# Patient Record
Sex: FEMALE | Race: WHITE | NOT HISPANIC OR LATINO | Employment: OTHER | URBAN - METROPOLITAN AREA
[De-identification: names, ages, dates, MRNs, and addresses within clinical notes are randomized per-mention and may not be internally consistent; named-entity substitution may affect disease eponyms.]

---

## 2017-04-19 ENCOUNTER — GENERIC CONVERSION - ENCOUNTER (OUTPATIENT)
Dept: OTHER | Facility: OTHER | Age: 77
End: 2017-04-19

## 2017-09-14 ENCOUNTER — ALLSCRIPTS OFFICE VISIT (OUTPATIENT)
Dept: OTHER | Facility: OTHER | Age: 77
End: 2017-09-14

## 2017-10-17 ENCOUNTER — GENERIC CONVERSION - ENCOUNTER (OUTPATIENT)
Dept: OTHER | Facility: OTHER | Age: 77
End: 2017-10-17

## 2017-10-19 ENCOUNTER — GENERIC CONVERSION - ENCOUNTER (OUTPATIENT)
Dept: OTHER | Facility: OTHER | Age: 77
End: 2017-10-19

## 2017-11-28 ENCOUNTER — GENERIC CONVERSION - ENCOUNTER (OUTPATIENT)
Dept: OTHER | Facility: OTHER | Age: 77
End: 2017-11-28

## 2018-01-15 NOTE — RESULT NOTES
Verified Results  (1) CBC/PLT/DIFF [id number] 07:26AM Puneet Ozuna     Test Name Result Flag Reference   WBC COUNT 6 60 Thousand/uL  4 80-10 80   WBC ADJUSTED 6 60 Thousand/uL  4 80-10 80   RBC COUNT 4 95 Million/uL  4 20-5 40   HEMOGLOBIN 14 2 g/dL  12 0-16 0   HEMATOCRIT 43 9 %  37 0-47 0   MCV 89 fL  82-98   MCH 28 8 pg  27 0-31 0   MCHC 32 5 g/dL  31 4-37 4   RDW 14 7 %  11 6-15 1   MPV 8 1 fL L 8 9-12 7   PLATELET COUNT 514 Thousands/uL  130-400   nRBC AUTOMATED 0 /100 WBCs     NEUTROPHILS RELATIVE PERCENT 61 %  43-75   LYMPHOCYTES RELATIVE PERCENT 29 %  14-44   MONOCYTES RELATIVE PERCENT 7 %  4-12   EOSINOPHILS RELATIVE PERCENT 3 %  0-6   BASOPHILS RELATIVE PERCENT 1 %  0-1   NEUTROPHILS ABSOLUTE COUNT 4 00 Thousands/?L  1 85-7 62   LYMPHOCYTES ABSOLUTE COUNT 1 90 Thousands/?L  0 60-4 47   MONOCYTES ABSOLUTE COUNT 0 40 Thousand/?L  0 17-1 22   EOSINOPHILS ABSOLUTE COUNT 0 20 Thousand/?L  0 00-0 61   BASOPHILS ABSOLUTE COUNT 0 00 Thousands/?L  0 00-0 10     (1) COMPREHENSIVE METABOLIC PANEL [id number] 02:00TW Lily Youssef     Test Name Result Flag Reference   GLUCOSE,RANDM 102 mg/dL     If the patient is fasting, the ADA then defines impaired fasting glucose as > 100 mg/dL and diabetes as > or equal to 123 mg/dL     SODIUM 141 mmol/L  136-145   POTASSIUM 3 9 mmol/L  3 5-5 3   CHLORIDE 107 mmol/L  100-108   CARBON DIOXIDE 26 mmol/L  21-32   ANION GAP (CALC) 8 mmol/L  4-13   BLOOD UREA NITROGEN 18 mg/dL  5-25   CREATININE 0 70 mg/dL  0 60-1 30   Standardized to IDMS reference method   CALCIUM 8 8 mg/dL  8 3-10 1   BILI, TOTAL 0 50 mg/dL  0 20-1 00   ALK PHOSPHATAS 70 U/L     ALT (SGPT) 38 U/L  12-78   AST(SGOT) 23 U/L  5-45   ALBUMIN 3 4 g/dL L 3 5-5 0   TOTAL PROTEIN 6 7 g/dL  6 4-8 2   eGFR Non-African American      >60 0 ml/min/1 73sq m   [employer] Disease Education Program recommendations are as follows:  GFR calculation is accurate only with a steady state creatinine  Chronic Kidney disease less than 60 ml/min/1 73 sq  meters  Kidney failure less than 15 ml/min/1 73 sq  meters  (1) LIPID PANEL FASTING W DIRECT LDL REFLEX [id number] 07:26AM Eugenie Gusman     Test Name Result Flag Reference   CHOLESTEROL 247 mg/dL H    LDL CHOLESTEROL CALCULATED 127 mg/dL H 0-100   Triglyceride:         Normal              <150 mg/dl       Borderline High    150-199 mg/dl       High               200-499 mg/dl       Very High          >499 mg/dl  Cholesterol:         Desirable        <200 mg/dl      Borderline High  200-239 mg/dl      High             >239 mg/dl  HDL Cholesterol:        High    >59 mg/dL      Low     <41 mg/dL  LDL Cholesterol:        Optimal          <100 mg/dl        Near Optimal     100-129 mg/dl        Above Optimal          Borderline High   130-159 mg/dl          High              160-189 mg/dl          Very High        >189 mg/dl  LDL CALCULATED:    This screening LDL is a calculated result  It does not have the accuracy of the Direct Measured LDL in the monitoring of patients with hyperlipidemia and/or statin therapy  Direct Measure LDL (TZN064) must be ordered separately in these patients  TRIGLYCERIDES 84 mg/dL  <=150   Specimen collection should occur prior to N-Acetylcysteine or Metamizole administration due to the potential for falsely depressed results  HDL,DIRECT 103 mg/dL H 40-60   Specimen collection should occur prior to Metamizole administration due to the potential for falsely depressed results  (1) TSH WITH FT4 REFLEX [id number] 07:26AM Lily Youssef     Test Name Result Flag Reference   TSH 2 714 uIU/mL  0 358-3 740   Patients undergoing fluorescein dye angiography may retain small amounts of fluorescein in the body for 48-72 hours post procedure  Samples containing fluorescein can produce falsely depressed TSH values  If the patient had this procedure,a specimen should be resubmitted post fluorescein clearance          The recommended reference ranges for TSH during pregnancy are as follows:  First trimester 0 1 to 2 5 uIU/mL  Second trimester  0 2 to 3 0 uIU/mL  Third trimester 0 3 to 3 0 uIU/m       Discussion/Summary   Jose C Story- your HDL is elevated and your LDL is slightly elevated, which is consistent with you told me it has been in the past   Please schedule an appointment if you would ilke to review these    GUS Coleman     Signatures   Electronically signed by : Ronaldo Winkler; May 16 2016  9:02AM EST                       (Author)

## 2018-01-17 NOTE — CONSULTS
Chief Complaint  Pre-Op Medication review blood pressure check Dr Dale Salinas 10/04/2017 [phone] Eye lid manipulation [id number]      History of Present Illness  Pre-Op Visit (Brief): The patient is being seen for a preoperative visit and Lifting of upper eye lids affecting peripheral vision  Surgical Risk Assessment:   Prior Anesthesia: She had prior anesthesia, no prior adverse reaction to edidural anesthesia, no prior adverse reaction to spinal anesthesia, a prior adverse reaction to general anesthesia and conscious sedation for endoscopy  Pertinent Past Medical History: Hyperlipidemia, Hypertension  Exercise Capacity: able to walk four blocks without symptoms and able to walk two flights of stairs without symptoms  Lifestyle Factors: denies alcohol use, denies tobacco use and denies illegal drug use  Symptoms: no symptoms, no easy bleeding, no easy bruising, no heavy menses, no frequent nosebleeds, no chest pain, no cough, no dyspnea, no palpitations, no wheezing and Edema resolved  Living Situation: home is secure and supportive and no post-op concerns with her living situation  HPI: Here for preoperative visit for eye surgery  Will be having surgery on 10/4 for uplifting of upper eye lids  Denies any concerns and complaints  Follows with [facility]ologist at [facility] with Dr Antonia Mohamud and had last visit 6 months ago and did the blood work there  Review of Systems    Constitutional: No fever, no chills, feels well, no tiredness, no recent weight gain or weight loss  Eyes: as noted in HPI    ENT: no complaints of earache, no loss of hearing, no nose bleeds, no nasal discharge, no sore throat, no hoarseness  Cardiovascular: No complaints of slow heart rate, no fast heart rate, no chest pain, no palpitations, no leg claudication, no lower extremity edema  Respiratory: No complaints of shortness of breath, no wheezing, no cough, no SOB on exertion, no orthopnea, no PND     Gastrointestinal: No complaints of abdominal pain, no constipation, no nausea or vomiting, no diarrhea, no bloody stools  Genitourinary: No complaints of dysuria, no incontinence, no pelvic pain, no dysmenorrhea, no vaginal discharge or bleeding  Integumentary: No complaints of skin rash or lesions, no itching, no skin wounds, no breast pain or lump  Neurological: No complaints of headache, no confusion, no convulsions, no numbness, no dizziness or fainting, no tingling, no limb weakness, no difficulty walking  Endocrine: No complaints of proptosis, no hot flashes, no muscle weakness, no deepening of the voice, no feelings of weakness  Hematologic/Lymphatic: No complaints of swollen glands, no swollen glands in the neck, does not bleed easily, does not bruise easily  Active Problems    1  Acute UTI (599 0) (N39 0)   2  Allergic rhinitis (477 9) (J30 9)   3  Bilateral edema of lower extremity (782 3) (R60 0)   4  Gastric ulcer (531 90) (K25 9)   5  Leg cramp (729 82) (R25 2)   6  Meningioma (225 2) (D32 9)   7  Menopause (627 2) (Z78 0)   8  Mixed hyperlipidemia (272 2) (E78 2)   9  Need for diphtheria-tetanus-pertussis (Tdap) vaccine (V06 1) (Z23)   10  Need for immunization against influenza (V04 81) (Z23)   11  Neoplasm of uncertain behavior of skin (238 2) (D48 5)   12  Never a smoker    Past Medical History    · Acute upper respiratory infection (465 9) (J06 9)   · Screening for cardiovascular condition (V81 2) (Z13 6)   · Screening for thyroid disorder (V77 0) (Z13 29)    The active problems and past medical history were reviewed and updated today  Surgical History    · History of Total Abdominal Hysterectomy With Removal Of Both Ovaries   · History of Total Hip Replacement    The surgical history was reviewed and updated today         Family History    · Family history of lung cancer (V16 1) (Z80 1)    · Family history of multiple sclerosis (V17 2) (Z82 0)   · Family history of thyroid disease (V18 19) (Z83 49)    · Family history of diabetes mellitus (V18 0) (Z83 3)   · Family history of Graves' disease (V18 19) (Z83 49)   · Family history of Ovarian cancer    The family history was reviewed and updated today  Social History    · Never a smoker  The social history was reviewed and updated today  The social history was reviewed and is unchanged  Current Meds   1  Aspir-81 81 MG Oral Tablet Delayed Release; Take 1 tablet daily Recorded   2  Valsartan 80 MG Oral Tablet; 1 every day; Therapy: [id number] to Recorded    The medication list was reviewed and updated today  Allergies    1  Percocet TABS   2  Macrodantin   3  sulfa    Vitals  Signs    Temperature: 97 F  Heart Rate: 80  Respiration: 20  Systolic: 718  Diastolic: 80  Height: 5 ft 7 5 in  Weight: 237 lb   BMI Calculated: 36 57  BSA Calculated: 2 19    Physical Exam    Constitutional   General appearance: Abnormal   overweight  Head and Face   Head and face: Normal     Palpation of the face and sinuses: No sinus tenderness  Eyes   Conjunctiva and lids: No swelling, erythema or discharge  Pupils and irises: Equal, round, reactive to light  Ears, Nose, Mouth, and Throat   External inspection of ears and nose: Normal     Otoscopic examination: Tympanic membranes translucent with normal light reflex  Canals patent without erythema  Nasal mucosa, septum, and turbinates: Normal without edema or erythema  Oropharynx: Normal with no erythema, edema, exudate or lesions  Neck   Neck: Supple, symmetric, trachea midline, no masses  Thyroid: Normal, no thyromegaly  Pulmonary   Respiratory effort: No increased work of breathing or signs of respiratory distress  Palpation of chest: Normal     Auscultation of lungs: Clear to auscultation  Cardiovascular   Auscultation of heart: Normal rate and rhythm, normal S1 and S2, no murmurs  Femoral pulses: 2+ bilaterally      Examination of extremities for edema and/or varicosities: Normal     Chest   Chest: Normal     Abdomen   Abdomen: Non-tender, no masses  Liver and spleen: No hepatomegaly or splenomegaly  Examination for hernias: No hernia appreciated  Lymphatic   Palpation of lymph nodes in neck: No lymphadenopathy  Palpation of lymph nodes in axillae: No lymphadenopathy  Palpation of lymph nodes in groin: No lymphadenopathy  Musculoskeletal   Gait and station: Normal     Skin   Skin and subcutaneous tissue: Normal without rashes or lesions  Neurologic   Reflexes: 2+ and symmetric  Sensation: No sensory loss  Coordination: Normal finger to nose and heel to shin  Psychiatric   Judgment and insight: Normal     Orientation to person, place, and time: Normal     Recent and remote memory: Intact  Mood and affect: Normal        Assessment    1  Benign hypertension (401 1) (I10)   2  Bilateral edema of lower extremity (782 3) (R60 0)   3  Meningioma (225 2) (D32 9)   4  Mixed hyperlipidemia (272 2) (E78 2)   5  Never a smoker   6  Pre-operative examination (V72 84) (Z01 818)   7  Limited peripheral vision (368 44) (H53 459)   8  Adult BMI 36 0-36 9 kg/sq m (V85 36) (Z68 36)    Plan  Benign hypertension    · Valsartan 80 MG Oral Tablet; 1 every day    Discussion/Summary  Surgical Clearance: She is at a MODERATE risk from a cardiovascular standpoint at this time without any additional cardiac testing  Reevaluation needed, if she should present with symptoms prior to surgery/procedure  Continue diovan 80 mg daily  Stop Aspirin week before surgery  Patient educated and instructions provided when to seek immediate medical attention  The patient was counseled regarding instructions for management, risk factor reductions, patient and family education, importance of compliance with treatment  Possible side effects of new medications were reviewed with the patient/guardian today  The treatment plan was reviewed with the patient/guardian   The patient/guardian understands and agrees with the treatment plan      End of Encounter Meds    1  Valsartan 80 MG Oral Tablet; 1 every day; Therapy: [id number] to Recorded    2  Aspir-81 81 MG Oral Tablet Delayed Release;  Take 1 tablet daily Recorded    Signatures   Electronically signed by : Junior Noriega; Sep 14 2017 10:54AM EST                       (Author)    Electronically signed by : CAMPOS Galvin ; Sep 14 2017  1:15PM EST                       (Review)

## 2018-01-22 VITALS
HEART RATE: 80 BPM | SYSTOLIC BLOOD PRESSURE: 130 MMHG | WEIGHT: 237 LBS | BODY MASS INDEX: 35.92 KG/M2 | TEMPERATURE: 97 F | RESPIRATION RATE: 20 BRPM | DIASTOLIC BLOOD PRESSURE: 80 MMHG | HEIGHT: 68 IN

## 2018-04-03 ENCOUNTER — TRANSCRIBE ORDERS (OUTPATIENT)
Dept: ADMINISTRATIVE | Facility: HOSPITAL | Age: 78
End: 2018-04-03

## 2018-04-03 DIAGNOSIS — M17.11 OSTEOARTHRITIS OF RIGHT KNEE, UNSPECIFIED OSTEOARTHRITIS TYPE: ICD-10-CM

## 2018-04-03 DIAGNOSIS — M54.5 LOW BACK PAIN, UNSPECIFIED BACK PAIN LATERALITY, UNSPECIFIED CHRONICITY, WITH SCIATICA PRESENCE UNSPECIFIED: Primary | ICD-10-CM

## 2018-04-09 ENCOUNTER — HOSPITAL ENCOUNTER (OUTPATIENT)
Dept: RADIOLOGY | Facility: HOSPITAL | Age: 78
End: 2018-04-09
Payer: MEDICARE

## 2018-04-09 ENCOUNTER — HOSPITAL ENCOUNTER (OUTPATIENT)
Dept: RADIOLOGY | Facility: HOSPITAL | Age: 78
Discharge: HOME | End: 2018-04-09
Payer: MEDICARE

## 2018-04-09 DIAGNOSIS — M54.5 LOW BACK PAIN, UNSPECIFIED BACK PAIN LATERALITY, UNSPECIFIED CHRONICITY, WITH SCIATICA PRESENCE UNSPECIFIED: ICD-10-CM

## 2018-04-09 DIAGNOSIS — M17.11 OSTEOARTHRITIS OF RIGHT KNEE, UNSPECIFIED OSTEOARTHRITIS TYPE: ICD-10-CM

## 2018-04-09 PROCEDURE — 72148 MRI LUMBAR SPINE W/O DYE: CPT

## 2018-04-09 PROCEDURE — 73721 MRI JNT OF LWR EXTRE W/O DYE: CPT

## 2018-04-16 ENCOUNTER — OFFICE VISIT (OUTPATIENT)
Dept: FAMILY MEDICINE CLINIC | Facility: CLINIC | Age: 78
End: 2018-04-16
Payer: MEDICARE

## 2018-04-16 VITALS
HEIGHT: 68 IN | TEMPERATURE: 98.1 F | DIASTOLIC BLOOD PRESSURE: 76 MMHG | SYSTOLIC BLOOD PRESSURE: 130 MMHG | RESPIRATION RATE: 20 BRPM | BODY MASS INDEX: 37.59 KG/M2 | HEART RATE: 82 BPM | WEIGHT: 248 LBS

## 2018-04-16 DIAGNOSIS — R42 DIZZINESS: ICD-10-CM

## 2018-04-16 DIAGNOSIS — R53.83 FATIGUE, UNSPECIFIED TYPE: ICD-10-CM

## 2018-04-16 DIAGNOSIS — I10 BENIGN HYPERTENSION: ICD-10-CM

## 2018-04-16 DIAGNOSIS — I48.0 PAROXYSMAL ATRIAL FIBRILLATION (HCC): ICD-10-CM

## 2018-04-16 DIAGNOSIS — R06.81 APNEIC EPISODE: ICD-10-CM

## 2018-04-16 DIAGNOSIS — R07.89 POSTERIOR CHEST PAIN: Primary | ICD-10-CM

## 2018-04-16 PROBLEM — I48.91 ATRIAL FIBRILLATION (HCC): Status: ACTIVE | Noted: 2018-04-16

## 2018-04-16 PROBLEM — H53.459: Status: ACTIVE | Noted: 2017-09-14

## 2018-04-16 PROCEDURE — 99214 OFFICE O/P EST MOD 30 MIN: CPT | Performed by: NURSE PRACTITIONER

## 2018-04-16 PROCEDURE — 93000 ELECTROCARDIOGRAM COMPLETE: CPT | Performed by: NURSE PRACTITIONER

## 2018-04-16 RX ORDER — VALSARTAN 80 MG/1
160 TABLET ORAL DAILY
COMMUNITY
Start: 2017-09-14 | End: 2019-08-24 | Stop reason: HOSPADM

## 2018-04-16 RX ORDER — ASPIRIN 81 MG/1
1 TABLET ORAL DAILY
COMMUNITY
End: 2019-09-25

## 2018-04-16 NOTE — PROGRESS NOTES
Assessment/Plan:    EKG WNL, no acute ischemia  Labs ordered to be done tomorrow  She will follow up with cardiology next week as previously scheduled  Advised to seek immediate medical attention for chest pains, SOB, symptoms on exertion, or recurrent jaw pain, nausea, or prolonged symptoms    Problem List Items Addressed This Visit     Benign hypertension     stable         Relevant Orders    Ambulatory referral to Pulmonology    Atrial fibrillation ([address] )     Rate controlled, not on anticoagulation  Has cardiology f/u next week         Apneic episode    Relevant Orders    Ambulatory referral to Pulmonology      Other Visit Diagnoses     Posterior chest pain    -  Primary    Relevant Orders    POCT ECG (Completed)    CBC and differential    Comprehensive metabolic panel    TSH, 3rd generation with T4 reflex    Troponin I    NT-BNP PRO    CK (with reflex to MB)    Dizziness        Relevant Orders    POCT ECG (Completed)    CBC and differential    Comprehensive metabolic panel    TSH, 3rd generation with T4 reflex    Troponin I    Fatigue, unspecified type        Relevant Orders    TSH, 3rd generation with T4 reflex          There are no Patient Instructions on file for this visit  No Follow-up on file  Subjective:      Patient ID: Edwyna Liza is a 68 y o  female  Chief Complaint   Patient presents with    Nausea     pt states she feeling "different"    Back Pain     upper back     Fatigue     sleeping a lot        Last night in the middle of the night she regurgitated warm fluid into her mouth and developed a pain in her jaw that lasted a few seconds  Also had posterior chest pain when she vomited  She went back to bed and similar symptoms recurred in the morning hours  She has been feeling fatigued and "off "  Has intermittent dizziness  Denies chest pain, SOB, or exertional symptoms  Sees a cardiologist   Negative stress test   History of a fib, not on anticoagulation    Recently returned from [place], where she resides during the winter months  Has appt with cardiologist next week  Recent labs 3 months ago, cholesterol stable        The following portions of the patient's history were reviewed and updated as appropriate: allergies, current medications, past family history, past medical history, past social history, past surgical history and problem list     Review of Systems   Constitutional: Positive for fatigue  Negative for chills and fever  Respiratory: Negative for cough, shortness of breath and wheezing  Cardiovascular: Positive for chest pain  Negative for palpitations and leg swelling  Gastrointestinal: Positive for nausea and vomiting  Negative for abdominal pain and diarrhea  Skin: Negative for rash  Neurological: Negative for dizziness and headaches  All other systems reviewed and are negative  Current Outpatient Prescriptions   Medication Sig Dispense Refill    aspirin (ASPIR-81) 81 mg EC tablet Take 1 tablet by mouth daily      valsartan (DIOVAN) 80 mg tablet Take by mouth daily       No current facility-administered medications for this visit  Objective:    /76   Pulse 82   Temp 98 1 °F (36 7 °C)   Resp 20   Ht 5' 7 5" (1 715 m)   Wt 112 kg (248 lb)   BMI 38 27 kg/m²        Physical Exam   Constitutional: She appears well-developed and well-nourished  HENT:   Right Ear: Tympanic membrane, external ear and ear canal normal    Left Ear: Tympanic membrane, external ear and ear canal normal    Nose: No mucosal edema  Mouth/Throat: Oropharynx is clear and moist and mucous membranes are normal    Eyes: Conjunctivae are normal    Cardiovascular: Normal rate, regular rhythm and normal heart sounds  Pulmonary/Chest: Effort normal and breath sounds normal    Abdominal: Bowel sounds are normal  She exhibits no distension  There is no splenomegaly or hepatomegaly  There is no tenderness     Lymphadenopathy:        Right cervical: No superficial cervical adenopathy present  Left cervical: No superficial cervical adenopathy present  Skin: No rash noted  Psychiatric: She has a normal mood and affect  Nursing note and vitals reviewed               Joel Alex

## 2018-04-17 ENCOUNTER — TRANSCRIBE ORDERS (OUTPATIENT)
Dept: ADMINISTRATIVE | Facility: HOSPITAL | Age: 78
End: 2018-04-17

## 2018-04-17 ENCOUNTER — TELEPHONE (OUTPATIENT)
Dept: FAMILY MEDICINE CLINIC | Facility: CLINIC | Age: 78
End: 2018-04-17

## 2018-04-17 ENCOUNTER — APPOINTMENT (OUTPATIENT)
Dept: LAB | Facility: HOSPITAL | Age: 78
End: 2018-04-17
Payer: MEDICARE

## 2018-04-17 DIAGNOSIS — R42 DIZZINESS: ICD-10-CM

## 2018-04-17 DIAGNOSIS — R53.83 FATIGUE, UNSPECIFIED TYPE: ICD-10-CM

## 2018-04-17 DIAGNOSIS — R07.89 POSTERIOR CHEST PAIN: ICD-10-CM

## 2018-04-17 LAB
ALBUMIN SERPL BCP-MCNC: 3.4 G/DL (ref 3.5–5)
ALP SERPL-CCNC: 73 U/L (ref 46–116)
ALT SERPL W P-5'-P-CCNC: 27 U/L (ref 12–78)
ANION GAP SERPL CALCULATED.3IONS-SCNC: 8 MMOL/L (ref 4–13)
AST SERPL W P-5'-P-CCNC: 16 U/L (ref 5–45)
BASOPHILS # BLD AUTO: 0 THOUSANDS/ΜL (ref 0–0.1)
BASOPHILS NFR BLD AUTO: 0 % (ref 0–1)
BILIRUB SERPL-MCNC: 0.4 MG/DL (ref 0.2–1)
BUN SERPL-MCNC: 18 MG/DL (ref 5–25)
CALCIUM SERPL-MCNC: 9.3 MG/DL (ref 8.3–10.1)
CHLORIDE SERPL-SCNC: 108 MMOL/L (ref 100–108)
CK SERPL-CCNC: 69 U/L (ref 26–192)
CO2 SERPL-SCNC: 27 MMOL/L (ref 21–32)
CREAT SERPL-MCNC: 0.77 MG/DL (ref 0.6–1.3)
EOSINOPHIL # BLD AUTO: 0.2 THOUSAND/ΜL (ref 0–0.61)
EOSINOPHIL NFR BLD AUTO: 2 % (ref 0–6)
ERYTHROCYTE [DISTWIDTH] IN BLOOD BY AUTOMATED COUNT: 14.2 % (ref 11.6–15.1)
GFR SERPL CREATININE-BSD FRML MDRD: 75 ML/MIN/1.73SQ M
GLUCOSE SERPL-MCNC: 101 MG/DL (ref 65–140)
HCT VFR BLD AUTO: 42 % (ref 37–47)
HGB BLD-MCNC: 13.7 G/DL (ref 12–16)
LYMPHOCYTES # BLD AUTO: 2.2 THOUSANDS/ΜL (ref 0.6–4.47)
LYMPHOCYTES NFR BLD AUTO: 26 % (ref 14–44)
MCH RBC QN AUTO: 28.8 PG (ref 27–31)
MCHC RBC AUTO-ENTMCNC: 32.6 G/DL (ref 31.4–37.4)
MCV RBC AUTO: 88 FL (ref 82–98)
MONOCYTES # BLD AUTO: 0.5 THOUSAND/ΜL (ref 0.17–1.22)
MONOCYTES NFR BLD AUTO: 6 % (ref 4–12)
NEUTROPHILS # BLD AUTO: 5.6 THOUSANDS/ΜL (ref 1.85–7.62)
NEUTS SEG NFR BLD AUTO: 66 % (ref 43–75)
NRBC BLD AUTO-RTO: 0 /100 WBCS
NT-PROBNP SERPL-MCNC: 143 PG/ML
PLATELET # BLD AUTO: 246 THOUSANDS/UL (ref 130–400)
PMV BLD AUTO: 8.1 FL (ref 8.9–12.7)
POTASSIUM SERPL-SCNC: 4.1 MMOL/L (ref 3.5–5.3)
PROT SERPL-MCNC: 7 G/DL (ref 6.4–8.2)
RBC # BLD AUTO: 4.76 MILLION/UL (ref 4.2–5.4)
SODIUM SERPL-SCNC: 143 MMOL/L (ref 136–145)
TROPONIN I SERPL-MCNC: <0.02 NG/ML
TSH SERPL DL<=0.05 MIU/L-ACNC: 2.83 UIU/ML (ref 0.36–3.74)
WBC # BLD AUTO: 8.4 THOUSAND/UL (ref 4.8–10.8)

## 2018-04-17 PROCEDURE — 84484 ASSAY OF TROPONIN QUANT: CPT

## 2018-04-17 PROCEDURE — 83880 ASSAY OF NATRIURETIC PEPTIDE: CPT

## 2018-04-17 PROCEDURE — 82550 ASSAY OF CK (CPK): CPT

## 2018-04-17 PROCEDURE — 80053 COMPREHEN METABOLIC PANEL: CPT

## 2018-04-17 PROCEDURE — 84443 ASSAY THYROID STIM HORMONE: CPT

## 2018-04-17 PROCEDURE — 36415 COLL VENOUS BLD VENIPUNCTURE: CPT

## 2018-04-17 PROCEDURE — 85025 COMPLETE CBC W/AUTO DIFF WBC: CPT

## 2018-04-17 NOTE — TELEPHONE ENCOUNTER
Pt aware normal labs  Acute cardiac event is unlikely, however recommend f/u with cardiologist next week as previously scheduled  States she is feeling much better today  No episodes of chest pain, SOB, jaw pain, or other symptoms   No further action required

## 2018-04-23 ENCOUNTER — OFFICE VISIT (OUTPATIENT)
Dept: FAMILY MEDICINE CLINIC | Facility: CLINIC | Age: 78
End: 2018-04-23
Payer: MEDICARE

## 2018-04-23 VITALS
HEART RATE: 76 BPM | SYSTOLIC BLOOD PRESSURE: 136 MMHG | TEMPERATURE: 98.3 F | HEIGHT: 68 IN | BODY MASS INDEX: 37.59 KG/M2 | DIASTOLIC BLOOD PRESSURE: 88 MMHG | WEIGHT: 248 LBS | RESPIRATION RATE: 20 BRPM

## 2018-04-23 DIAGNOSIS — H00.011 HORDEOLUM EXTERNUM OF RIGHT UPPER EYELID: Primary | ICD-10-CM

## 2018-04-23 PROCEDURE — 99213 OFFICE O/P EST LOW 20 MIN: CPT | Performed by: NURSE PRACTITIONER

## 2018-04-23 RX ORDER — TOBRAMYCIN AND DEXAMETHASONE 3; 1 MG/ML; MG/ML
1 SUSPENSION/ DROPS OPHTHALMIC
Qty: 5 ML | Refills: 0 | Status: SHIPPED | OUTPATIENT
Start: 2018-04-23 | End: 2019-08-19

## 2018-04-23 NOTE — PROGRESS NOTES
Assessment/Plan:  Supportive care discussed and advised  Follow up for no improvement and worsening of conditions  Patient advised and educated when to see immediate medical care  Diagnoses and all orders for this visit:    Hordeolum externum of right upper eyelid  -     tobramycin-dexamethasone (TOBRADEX) ophthalmic suspension; Administer 1 drop to the right eye every 4 (four) hours while awake    BMI 38 0-38 9,adult          Return if symptoms worsen or fail to improve  Subjective:      Patient ID: Saeid Drew is a 68 y o  female  Chief Complaint   Patient presents with    Right eyelid with a tender lump for the past few days [facility]       HPI  Patient started with swelling of right eye upper eye lid and redness of eye with mild discomfort but denies any visual changes  Denies use of OTC  Denies fever and chills  The following portions of the patient's history were reviewed and updated as appropriate: allergies, current medications, past family history, past medical history, past social history, past surgical history and problem list       Review of Systems   Constitutional: Negative  Eyes: Positive for redness  Negative for photophobia, pain, discharge, itching and visual disturbance  Respiratory: Negative  Cardiovascular: Negative  Neurological: Negative  Psychiatric/Behavioral: Negative  Objective:    History   Smoking Status    Never Smoker   Smokeless Tobacco    Not on file       Allergies:    Allergies   Allergen Reactions    Macrodantin [Nitrofurantoin Macrocrystal]     Nitrofurantoin     Oxycodone-Acetaminophen Vomiting    Sulfa Antibiotics        Vitals:  /88   Pulse 76   Temp 98 3 °F (36 8 °C)   Resp 20   Ht 5' 7 5" (1 715 m)   Wt 112 kg (248 lb)   BMI 38 27 kg/m²     Current Outpatient Prescriptions   Medication Sig Dispense Refill    aspirin (ASPIR-81) 81 mg EC tablet Take 1 tablet by mouth daily      valsartan (DIOVAN) 80 mg tablet Take 160 mg by mouth daily        tobramycin-dexamethasone (TOBRADEX) ophthalmic suspension Administer 1 drop to the right eye every 4 (four) hours while awake 5 mL 0     No current facility-administered medications for this visit  Physical Exam   Constitutional: She is oriented to person, place, and time  She appears well-developed and well-nourished  HENT:   Head: Normocephalic and atraumatic  Right Ear: External ear normal    Left Ear: External ear normal    Eyes: Pupils are equal, round, and reactive to light  Right eye exhibits hordeolum (right upper eye lid)  Right conjunctiva is injected  Cardiovascular: Normal rate, regular rhythm and normal heart sounds  Pulmonary/Chest: Effort normal and breath sounds normal    Neurological: She is alert and oriented to person, place, and time  Skin: Skin is warm and dry  Psychiatric: She has a normal mood and affect   Her behavior is normal  Thought content normal          GUS Seals

## 2018-04-23 NOTE — PATIENT INSTRUCTIONS
Supportive care discussed and advised  Follow up for no improvement and worsening of conditions  Patient advised and educated when to see immediate medical care  Stye   WHAT YOU NEED TO KNOW:   A stye is a lump on the edge or inside of your eyelid caused by an infection  A stye can form on your upper or lower eyelid  It usually goes away in 2 to 4 days  DISCHARGE INSTRUCTIONS:   Medicines:   · Antibiotic medicine: This is given as an ointment to put into your eye  It is used to fight an infection caused by bacteria  Use as directed  · Take your medicine as directed  Contact your healthcare provider if you think your medicine is not helping or if you have side effects  Tell him of her if you are allergic to any medicine  Keep a list of the medicines, vitamins, and herbs you take  Include the amounts, and when and why you take them  Bring the list or the pill bottles to follow-up visits  Carry your medicine list with you in case of an emergency  Follow up with your healthcare provider as directed:  Write down your questions so you remember to ask them during your visits  Self-care:   · Use warm compresses: This will help decrease swelling and pain  Wet a clean washcloth with warm water and place it on your eye for 10 to 15 minutes, 3 to 4 times each day or as directed  · Keep your hands away from your eye: This helps to prevent the spread of the infection to other parts of the eye  Wash your hands often with soap and dry with a clean towel  Do not squeeze the stye  · Do not use eye makeup:  Do not wear eye makeup while you have a stye  Eye makeup may carry bacteria and cause another stye  Throw away eye makeup and brushes used to apply the makeup  Use new eye makeup after the stye has gone away  Do not share eye makeup with others  · Prevent another stye:  Wash your face and clean your eyelashes every day  Remove eye makeup with makeup remover   This helps to completely remove eye makeup without heavy rubbing  Contact your healthcare provider if:   · You have redness and discharge around your eye, and your eye pain is getting worse  · Your vision changes  · The stye has not gone away within 7 days  · The stye comes back within a short period of time after treatment  · You have questions or concerns about your condition or care  © 2017 [address] [employer] Information is for End User's use only and may not be sold, redistributed or otherwise used for commercial purposes  All illustrations and images included in CareNotes® are the copyrighted property of [employer] , [employer]  or Alistair Whalen  The above information is an  only  It is not intended as medical advice for individual conditions or treatments  Talk to your doctor, nurse or pharmacist before following any medical regimen to see if it is safe and effective for you

## 2018-12-12 RX ORDER — ATORVASTATIN CALCIUM 20 MG/1
TABLET, FILM COATED ORAL DAILY
Refills: 2 | COMMUNITY
Start: 2018-10-11 | End: 2019-09-25

## 2018-12-12 NOTE — PROGRESS NOTES
Subjective:      Patient ID: Maximo Jurado is a 66 y o  female  Chief Complaint   Patient presents with    Physical Exam       Here with some issues  Has been having urinary incontinence which is worsening  Has had progressively worsening stress incontinence but is also having urge incontinence as well  Has some fatigue and is feeling she needs a nap daily  No weight changes or constipation, no cold intolerance  Sees a cardiologist regularly and he recently increased her bp med, is doing well  The following portions of the patient's history were reviewed and updated as appropriate: allergies, current medications, past family history, past medical history, past social history, past surgical history and problem list     Review of Systems   Constitutional: Negative  Respiratory: Negative  Cardiovascular: Negative  Current Outpatient Prescriptions   Medication Sig Dispense Refill    aspirin (ASPIR-81) 81 mg EC tablet Take 1 tablet by mouth daily      atorvastatin (LIPITOR) 20 mg tablet   2    irbesartan (AVAPRO) 300 mg tablet Take 300 mg by mouth daily        magnesium gluconate (MAGONATE) 500 mg tablet Take 500 mg by mouth 2 (two) times a day      Omega-3 Fatty Acids (FISH OIL) 1,000 mg Take 1,000 mg by mouth daily      tobramycin-dexamethasone (TOBRADEX) ophthalmic suspension Administer 1 drop to the right eye every 4 (four) hours while awake (Patient not taking: Reported on 12/17/2018 ) 5 mL 0    valsartan (DIOVAN) 80 mg tablet Take 160 mg by mouth daily         No current facility-administered medications for this visit  Objective:    /80   Pulse 80   Temp (!) 97 4 °F (36 3 °C)   Resp 16   Ht 5' 7 5" (1 715 m)   Wt 114 kg (252 lb)   BMI 38 89 kg/m²        Physical Exam   Constitutional: She appears well-developed and well-nourished  Eyes: Conjunctivae are normal    Neck: Neck supple  No JVD present  No thyromegaly present     Cardiovascular: Normal rate, regular rhythm, normal heart sounds and intact distal pulses  Exam reveals no gallop and no friction rub  No murmur heard  Pulmonary/Chest: Effort normal and breath sounds normal  She has no wheezes  She has no rales  Abdominal: Soft  Bowel sounds are normal  She exhibits no distension  There is no tenderness  Musculoskeletal: She exhibits no edema  Assessment/Plan:    Benign hypertension  Stable on current medication, will continue  Mixed hyperlipidemia  Followed by cardiologist, lipids within goal   Continue statin  Diagnoses and all orders for this visit:    Medicare annual wellness visit, subsequent    Benign hypertension  -     Comprehensive metabolic panel; Future    Mixed hyperlipidemia    Other fatigue  Comments: Will check TSH  Orders:  -     TSH, 3rd generation with Free T4 reflex; Future  -     Ambulatory referral to Pulmonology; Future    Snoring  Comments:  Referred to pulmonology for sleep study, r/o RAJWINDER  Orders:  -     Ambulatory referral to Pulmonology; Future    Urinary incontinence, unspecified type  Comments:  Seems to have mixed stress and urge incontinence  Will refer to urogynecology, Dr Roxie Cheng information given  Other orders  -     atorvastatin (LIPITOR) 20 mg tablet;   -     irbesartan (AVAPRO) 300 mg tablet; Take 300 mg by mouth daily    -     magnesium gluconate (MAGONATE) 500 mg tablet; Take 500 mg by mouth 2 (two) times a day  -     Omega-3 Fatty Acids (FISH OIL) 1,000 mg; Take 1,000 mg by mouth daily          Return in about 6 months (around 6/17/2019)         Abraham Underwood MD

## 2018-12-17 ENCOUNTER — OFFICE VISIT (OUTPATIENT)
Dept: FAMILY MEDICINE CLINIC | Facility: CLINIC | Age: 78
End: 2018-12-17
Payer: MEDICARE

## 2018-12-17 VITALS
DIASTOLIC BLOOD PRESSURE: 80 MMHG | WEIGHT: 252 LBS | RESPIRATION RATE: 16 BRPM | HEIGHT: 68 IN | TEMPERATURE: 97.4 F | SYSTOLIC BLOOD PRESSURE: 130 MMHG | HEART RATE: 80 BPM | BODY MASS INDEX: 38.19 KG/M2

## 2018-12-17 DIAGNOSIS — R53.83 OTHER FATIGUE: ICD-10-CM

## 2018-12-17 DIAGNOSIS — R06.83 SNORING: ICD-10-CM

## 2018-12-17 DIAGNOSIS — I10 BENIGN HYPERTENSION: ICD-10-CM

## 2018-12-17 DIAGNOSIS — E78.2 MIXED HYPERLIPIDEMIA: ICD-10-CM

## 2018-12-17 DIAGNOSIS — R32 URINARY INCONTINENCE, UNSPECIFIED TYPE: ICD-10-CM

## 2018-12-17 DIAGNOSIS — Z00.00 MEDICARE ANNUAL WELLNESS VISIT, SUBSEQUENT: Primary | ICD-10-CM

## 2018-12-17 PROCEDURE — G0439 PPPS, SUBSEQ VISIT: HCPCS | Performed by: INTERNAL MEDICINE

## 2018-12-17 PROCEDURE — 99214 OFFICE O/P EST MOD 30 MIN: CPT | Performed by: INTERNAL MEDICINE

## 2018-12-17 RX ORDER — IRBESARTAN 300 MG/1
300 TABLET ORAL DAILY
COMMUNITY
Start: 2018-11-09 | End: 2019-11-22 | Stop reason: SDUPTHER

## 2018-12-17 RX ORDER — CHLORAL HYDRATE 500 MG
1000 CAPSULE ORAL DAILY
COMMUNITY
End: 2019-08-19

## 2018-12-17 RX ORDER — UREA 10 %
500 LOTION (ML) TOPICAL 2 TIMES DAILY
COMMUNITY
End: 2019-08-19

## 2018-12-17 NOTE — PATIENT INSTRUCTIONS

## 2018-12-17 NOTE — PROGRESS NOTES
Assessment and Plan:  Problem List Items Addressed This Visit        Cardiovascular and Mediastinum    Benign hypertension    Relevant Medications    irbesartan (AVAPRO) 300 mg tablet    Other Relevant Orders    Comprehensive metabolic panel       Other    Mixed hyperlipidemia    Relevant Medications    atorvastatin (LIPITOR) 20 mg tablet      Other Visit Diagnoses     Medicare annual wellness visit, subsequent    -  Primary    Other fatigue        Relevant Orders    TSH, 3rd generation with Free T4 reflex    Ambulatory referral to Pulmonology    Snoring        Relevant Orders    Ambulatory referral to Pulmonology        Health Maintenance Due   Topic Date Due    Medicare Annual Wellness Visit (AWV)  1940    INFLUENZA VACCINE  07/01/2018         HPI:  Patient Active Problem List   Diagnosis    Allergic rhinitis    Benign hypertension    At[facility] fibrillation ([facility] [address] [facility]    Gastric ulcer    Bilateral edema of lower extremity    Limited peripheral vision    [facility]    Menopause    Mixed hyperlipidemia    Neoplasm of uncertain behavior of skin    Apneic episode     Past Medical History:   Diagnosis Date    Cancer ([facility] )     Hyperlipidemia      Past Surgical History:   Procedure Laterality Date    HYSTERECTOMY      JOINT REPLACEMENT       No family history on file    History   Smoking Status    Never Smoker   Smokeless Tobacco    Not on file     History   Alcohol Use No      History   Drug Use No         Current Outpatient Prescriptions   Medication Sig Dispense Refill    aspirin (ASPIR-81) 81 mg EC tablet Take 1 tablet by mouth daily      atorvastatin (LIPITOR) 20 mg tablet   2    irbesartan (AVAPRO) 300 mg tablet Take 300 mg by mouth daily        magnesium gluconate (MAGONATE) 500 mg tablet Take 500 mg by mouth 2 (two) times a day      Omega-3 Fatty Acids (FISH OIL) 1,000 mg Take 1,000 mg by mouth daily      tobramycin-dexamethasone (TOBRADEX) ophthalmic suspension Administer 1 drop to the right eye every 4 (four) hours while awake (Patient not taking: Reported on 12/17/2018 ) 5 mL 0    valsartan (DIOVAN) 80 mg tablet Take 160 mg by mouth daily         No current facility-administered medications for this visit  Allergies   Allergen Reactions    Macrodantin [Nitrofurantoin Macrocrystal]     Nitrofurantoin     Oxycodone-Acetaminophen Vomiting    Sulfa Antibiotics      Immunization History   Administered Date(s) Administered    Influenza 11/01/2018    Influenza Split High Dose Preservative Free IM 11/28/2016    Pneumococcal Conjugate 13-Valent 01/01/2016    Pneumococcal Polysaccharide PPV23 01/01/2010    Tdap 05/26/2016    Zoster 01/01/2012       Patient Care Team:  Yoel Segura MD as PCP - General  MD Yoel George MD    Medicare Screening Tests and Risk Assessments:  Kassie Jose is here for her Subsequent Wellness visit  Health Risk Assessment:  Patient rates overall health as very good  Patient feels that their physical health rating is Slightly worse  Eyesight was rated as Same  Hearing was rated as Slightly worse  Patient feels that their emotional and mental health rating is Same  Pain experienced by patient in the last 7 days has been A lot  Patient's pain rating has been 8/10  Patient states that she has experienced no weight loss or gain in last 6 months  Emotional/Mental Health:  Patient has been feeling nervous/anxious  PHQ-9 Depression Screening:    Frequency of the following problems over the past two weeks:      1  Little interest or pleasure in doing things: 0 - not at all      2  Feeling down, depressed, or hopeless: 0 - not at all  PHQ-2 Score: 0          Broken Bones/Falls: Fall Risk Assessment:    In the past year, patient has experienced: No history of falling in past year          Bladder/Bowel:  Patient has leaked urine accidently in the last six months  Patient reports no loss of bowel control      Immunizations:  Patient has had a flu vaccination within the last year  Patient has received a pneumonia shot  Patient has received a shingles shot  Patient has received tetanus/diphtheria shot  Home Safety:  Patient has trouble with stairs inside or outside of their home  Patient currently reports that there are no safety hazards present in home, working smoke alarms, working carbon monoxide detectors  Preventative Screenings:   Breast cancer screening performed, colon cancer screen completed, cholesterol screen completed, glaucoma eye exam completed,     Nutrition:  Current diet: Regular and Limited junk food with servings of the following:  (Additional Comments: High fat)    Medications:  Patient is currently taking over-the-counter supplements  List of OTC medications includes: Magnesium and fish oil  Patient is able to manage medications  Lifestyle Choices:  Patient reports no tobacco use  Patient has not smoked or used tobacco in the past   Patient reports alcohol use  Alcohol use per week: socially  Patient drives a vehicle  Patient wears seat belt  Current level of exercise of physical activity described by patient as: Light walking   Activities of Daily Living:  Can get out of bed by his or her self, able to dress self, able to make own meals, able to do own shopping, able to bathe self, can do own laundry/housekeeping, can manage own money, pay bills and track expenses    Previous Hospitalizations:  No hospitalization or ED visit in past 12 months        Advanced Directives:  Patient has decided on a power of   Patient has spoken to designated power of   Patient has completed advanced directive          Preventative Screening/Counseling:      Cardiovascular:      General: Risks and Benefits Discussed and Screening Current     Due for Labs/Analytes/Optional EKG: Cholesterol and Lipid Panel          Diabetes:      General: Risks and Benefits Discussed and Screening Current      Counseling: Healthy Diet, Healthy Weight and Improve Physical Activity          Colorectal Cancer:      General: Screening Current          Breast Cancer:      General: Screening Not Indicated          Cervical Cancer:      General: Screening Not Indicated          Osteoporosis:      General: Patient Declines          AAA:      General: Screening Not Indicated          Glaucoma:      General: Screening Current          HIV:      General: Screening Not Indicated          Hepatitis C:      General: Patient Declines        Advanced Directives:   Patient has living will for healthcare, has durable POA for healthcare,     Immunizations:      Influenza: Influenza UTD This Year      Pneumococcal: Lifetime Vaccine Completed      Shingrix: Patient Declines      Hepatitis B (Low risk patients): Series Not Indicated      TD: Patient Declines      Other Preventative Counseling (Non-Medicare): Increase physical activity and Weight reduction discussed          Vision Screening Comments: Has an eye Dr Catherine Sidhu was recently seen  Physical Exam:  Review of Systems   Gastrointestinal: Negative for bowel incontinence  Psychiatric/Behavioral: The patient is not nervous/anxious  Vitals:    12/17/18 1121   BP: 130/80   Pulse: 80   Resp: 16   Temp: (!) 97 4 °F (36 3 °C)   Weight: 114 kg (252 lb)   Height: 5' 7 5" (1 715 m)   Body mass index is 38 89 kg/m²      Physical Exam

## 2019-01-04 ENCOUNTER — APPOINTMENT (OUTPATIENT)
Dept: LAB | Facility: HOSPITAL | Age: 79
End: 2019-01-04
Attending: INTERNAL MEDICINE
Payer: COMMERCIAL

## 2019-01-04 ENCOUNTER — TRANSCRIBE ORDERS (OUTPATIENT)
Dept: ADMINISTRATIVE | Facility: HOSPITAL | Age: 79
End: 2019-01-04

## 2019-01-04 DIAGNOSIS — E78.00 PURE HYPERCHOLESTEROLEMIA: ICD-10-CM

## 2019-01-04 DIAGNOSIS — R53.83 OTHER FATIGUE: ICD-10-CM

## 2019-01-04 DIAGNOSIS — E78.00 PURE HYPERCHOLESTEROLEMIA: Primary | ICD-10-CM

## 2019-01-04 DIAGNOSIS — I10 BENIGN HYPERTENSION: ICD-10-CM

## 2019-01-04 LAB
ALBUMIN SERPL BCP-MCNC: 3.5 G/DL (ref 3.5–5)
ALP SERPL-CCNC: 74 U/L (ref 46–116)
ALT SERPL W P-5'-P-CCNC: 24 U/L (ref 12–78)
ANION GAP SERPL CALCULATED.3IONS-SCNC: 8 MMOL/L (ref 4–13)
AST SERPL W P-5'-P-CCNC: 22 U/L (ref 5–45)
BILIRUB DIRECT SERPL-MCNC: 0.2 MG/DL (ref 0–0.2)
BILIRUB SERPL-MCNC: 0.9 MG/DL (ref 0.2–1)
BUN SERPL-MCNC: 17 MG/DL (ref 5–25)
CALCIUM SERPL-MCNC: 9.3 MG/DL (ref 8.3–10.1)
CHLORIDE SERPL-SCNC: 105 MMOL/L (ref 100–108)
CHOLEST SERPL-MCNC: 191 MG/DL (ref 50–200)
CO2 SERPL-SCNC: 28 MMOL/L (ref 21–32)
CREAT SERPL-MCNC: 0.82 MG/DL (ref 0.6–1.3)
GFR SERPL CREATININE-BSD FRML MDRD: 69 ML/MIN/1.73SQ M
GLUCOSE P FAST SERPL-MCNC: 99 MG/DL (ref 65–99)
HDLC SERPL-MCNC: 119 MG/DL (ref 40–60)
LDLC SERPL CALC-MCNC: 60 MG/DL (ref 0–100)
NONHDLC SERPL-MCNC: 72 MG/DL
POTASSIUM SERPL-SCNC: 3.8 MMOL/L (ref 3.5–5.3)
PROT SERPL-MCNC: 7 G/DL (ref 6.4–8.2)
SODIUM SERPL-SCNC: 141 MMOL/L (ref 136–145)
TRIGL SERPL-MCNC: 60 MG/DL
TSH SERPL DL<=0.05 MIU/L-ACNC: 3.24 UIU/ML (ref 0.36–3.74)

## 2019-01-04 PROCEDURE — 84443 ASSAY THYROID STIM HORMONE: CPT

## 2019-01-04 PROCEDURE — 80053 COMPREHEN METABOLIC PANEL: CPT

## 2019-01-04 PROCEDURE — 80061 LIPID PANEL: CPT

## 2019-01-04 PROCEDURE — 82248 BILIRUBIN DIRECT: CPT

## 2019-01-04 PROCEDURE — 36415 COLL VENOUS BLD VENIPUNCTURE: CPT

## 2019-02-11 ENCOUNTER — TELEPHONE (OUTPATIENT)
Dept: FAMILY MEDICINE CLINIC | Facility: CLINIC | Age: 79
End: 2019-02-11

## 2019-02-11 NOTE — TELEPHONE ENCOUNTER
Pt left a message on result hotline for her recent results she just had done 1/4/19  Pt is currently in [place], address to mail results is, [address] [facility] [phone]  I do see a letter is already mailed out, we will need to mail to her in [place]  If dr needs to speak with pt call [phone]   [place], [place]

## 2019-05-10 ENCOUNTER — APPOINTMENT (OUTPATIENT)
Dept: LAB | Facility: HOSPITAL | Age: 79
End: 2019-05-10
Payer: COMMERCIAL

## 2019-05-10 ENCOUNTER — TRANSCRIBE ORDERS (OUTPATIENT)
Dept: ADMINISTRATIVE | Facility: HOSPITAL | Age: 79
End: 2019-05-10

## 2019-05-10 DIAGNOSIS — E78.00 PURE HYPERCHOLESTEROLEMIA: Primary | ICD-10-CM

## 2019-05-10 DIAGNOSIS — E78.00 PURE HYPERCHOLESTEROLEMIA: ICD-10-CM

## 2019-05-10 LAB
ALBUMIN SERPL BCP-MCNC: 3.3 G/DL (ref 3.5–5)
ALP SERPL-CCNC: 81 U/L (ref 46–116)
ALT SERPL W P-5'-P-CCNC: 24 U/L (ref 12–78)
AST SERPL W P-5'-P-CCNC: 20 U/L (ref 5–45)
BILIRUB DIRECT SERPL-MCNC: 0.2 MG/DL (ref 0–0.2)
BILIRUB SERPL-MCNC: 0.7 MG/DL (ref 0.2–1)
CHOLEST SERPL-MCNC: 193 MG/DL (ref 50–200)
HDLC SERPL-MCNC: 116 MG/DL (ref 40–60)
LDLC SERPL CALC-MCNC: 67 MG/DL (ref 0–100)
NONHDLC SERPL-MCNC: 77 MG/DL
PROT SERPL-MCNC: 7.1 G/DL (ref 6.4–8.2)
TRIGL SERPL-MCNC: 50 MG/DL

## 2019-05-10 PROCEDURE — 80076 HEPATIC FUNCTION PANEL: CPT

## 2019-05-10 PROCEDURE — 80061 LIPID PANEL: CPT

## 2019-05-10 PROCEDURE — 36415 COLL VENOUS BLD VENIPUNCTURE: CPT

## 2019-06-19 PROBLEM — E66.01 MORBID OBESITY (HCC): Status: ACTIVE | Noted: 2017-04-19

## 2019-06-19 PROBLEM — K58.9 IRRITABLE BOWEL SYNDROME: Status: ACTIVE | Noted: 2019-06-19

## 2019-06-24 ENCOUNTER — TELEPHONE (OUTPATIENT)
Dept: FAMILY MEDICINE CLINIC | Facility: CLINIC | Age: 79
End: 2019-06-24

## 2019-08-19 ENCOUNTER — OFFICE VISIT (OUTPATIENT)
Dept: FAMILY MEDICINE CLINIC | Facility: CLINIC | Age: 79
End: 2019-08-19
Payer: COMMERCIAL

## 2019-08-19 VITALS
HEIGHT: 68 IN | RESPIRATION RATE: 16 BRPM | TEMPERATURE: 99.1 F | SYSTOLIC BLOOD PRESSURE: 140 MMHG | BODY MASS INDEX: 38.89 KG/M2 | DIASTOLIC BLOOD PRESSURE: 80 MMHG | HEART RATE: 80 BPM

## 2019-08-19 DIAGNOSIS — R39.9 UTI SYMPTOMS: Primary | ICD-10-CM

## 2019-08-19 PROCEDURE — 87077 CULTURE AEROBIC IDENTIFY: CPT | Performed by: NURSE PRACTITIONER

## 2019-08-19 PROCEDURE — 1101F PT FALLS ASSESS-DOCD LE1/YR: CPT | Performed by: NURSE PRACTITIONER

## 2019-08-19 PROCEDURE — 1160F RVW MEDS BY RX/DR IN RCRD: CPT | Performed by: NURSE PRACTITIONER

## 2019-08-19 PROCEDURE — 87186 SC STD MICRODIL/AGAR DIL: CPT | Performed by: NURSE PRACTITIONER

## 2019-08-19 PROCEDURE — 3725F SCREEN DEPRESSION PERFORMED: CPT | Performed by: NURSE PRACTITIONER

## 2019-08-19 PROCEDURE — 99213 OFFICE O/P EST LOW 20 MIN: CPT | Performed by: NURSE PRACTITIONER

## 2019-08-19 PROCEDURE — 87086 URINE CULTURE/COLONY COUNT: CPT | Performed by: NURSE PRACTITIONER

## 2019-08-19 RX ORDER — PHENAZOPYRIDINE HYDROCHLORIDE 200 MG/1
200 TABLET, FILM COATED ORAL
Qty: 10 TABLET | Refills: 0 | Status: SHIPPED | OUTPATIENT
Start: 2019-08-19 | End: 2019-08-30

## 2019-08-19 RX ORDER — ATORVASTATIN CALCIUM 40 MG/1
TABLET, FILM COATED ORAL
COMMUNITY
Start: 2019-07-13 | End: 2019-08-24 | Stop reason: HOSPADM

## 2019-08-19 RX ORDER — HYDROCODONE BITARTRATE AND ACETAMINOPHEN 5; 325 MG/1; MG/1
TABLET ORAL
Refills: 0 | COMMUNITY
Start: 2019-07-15 | End: 2019-08-30

## 2019-08-19 RX ORDER — DOXYCYCLINE HYCLATE 100 MG/1
100 CAPSULE ORAL EVERY 12 HOURS SCHEDULED
Qty: 20 CAPSULE | Refills: 0 | Status: SHIPPED | OUTPATIENT
Start: 2019-08-19 | End: 2019-08-24 | Stop reason: HOSPADM

## 2019-08-19 RX ORDER — NAPROXEN 500 MG/1
TABLET ORAL
Refills: 1 | COMMUNITY
Start: 2019-07-17 | End: 2019-08-24 | Stop reason: HOSPADM

## 2019-08-19 NOTE — PROGRESS NOTES
Assessment/Plan:    Will send urine for culture and start on Doxycycline  Reviewed supportive care  Will f/u with culture results  Problem List Items Addressed This Visit     None      Visit Diagnoses     UTI symptoms    -  Primary    Relevant Medications    doxycycline hyclate (VIBRAMYCIN) 100 mg capsule    phenazopyridine (PYRIDIUM) 200 mg tablet    Other Relevant Orders    Urine culture          BMI Counseling: Body mass index is 38 89 kg/m²  Discussed the patient's BMI with her  The BMI is above average  BMI counseling and education was provided to the patient  Exercise recommendations include moderate aerobic physical activity for 150 minutes/week  There are no Patient Instructions on file for this visit  Return if symptoms worsen or fail to improve  Subjective:      Patient ID: Priya Gardiner is a 78 y o  female  Chief Complaint   Patient presents with    Urinary Tract Infection     prcma       She saw Dr Donald Zapata about a month ago and was started on Doxycycline 100mg BID for 10 days  She was also treated for a yeast infection with Diflucan and Monistat  She developed recurrent symptoms about 3 days ago  She is taking Pyridium which helps  She had dysuria, frequency, and urgency  Denies flank pain, n/v, fevers, or chills  She notes a decreased appetite  Urine appears normal in color with no foul odor  Started a new course of Monistat today  The following portions of the patient's history were reviewed and updated as appropriate: allergies, current medications, past family history, past medical history, past social history, past surgical history and problem list     Review of Systems   Constitutional: Negative for chills, fatigue and fever  Respiratory: Negative for cough and shortness of breath  Cardiovascular: Negative for chest pain  Gastrointestinal: Negative for abdominal pain, diarrhea, nausea and vomiting     Genitourinary: Positive for dysuria, frequency and urgency  Negative for flank pain, hematuria, pelvic pain and vaginal discharge  Musculoskeletal: Negative for arthralgias and back pain  Current Outpatient Medications   Medication Sig Dispense Refill    aspirin (ASPIR-81) 81 mg EC tablet Take 1 tablet by mouth daily      atorvastatin (LIPITOR) 20 mg tablet   2    atorvastatin (LIPITOR) 40 mg tablet       HYDROcodone-acetaminophen (NORCO) 5-325 mg per tablet   0    irbesartan (AVAPRO) 300 mg tablet Take 300 mg by mouth daily        naproxen (NAPROSYN) 500 mg tablet   1    valsartan (DIOVAN) 80 mg tablet Take 160 mg by mouth daily        doxycycline hyclate (VIBRAMYCIN) 100 mg capsule Take 1 capsule (100 mg total) by mouth every 12 (twelve) hours for 10 days 20 capsule 0    phenazopyridine (PYRIDIUM) 200 mg tablet Take 1 tablet (200 mg total) by mouth 3 (three) times a day with meals 10 tablet 0     No current facility-administered medications for this visit  Objective:    /80   Pulse 80   Temp 99 1 °F (37 3 °C)   Resp 16   Ht 5' 7 5" (1 715 m)   BMI 38 89 kg/m²        Physical Exam   Constitutional: She appears well-developed and well-nourished  Cardiovascular: Normal rate, regular rhythm and normal heart sounds  No murmur heard  Pulmonary/Chest: Effort normal and breath sounds normal    Neurological: She is alert  Skin: Skin is warm and dry  Psychiatric: She has a normal mood and affect  Nursing note and vitals reviewed               Chet Pavon

## 2019-08-21 ENCOUNTER — TELEPHONE (OUTPATIENT)
Dept: FAMILY MEDICINE CLINIC | Facility: CLINIC | Age: 79
End: 2019-08-21

## 2019-08-21 DIAGNOSIS — R39.9 UTI SYMPTOMS: Primary | ICD-10-CM

## 2019-08-21 DIAGNOSIS — N39.0 ACUTE UTI: ICD-10-CM

## 2019-08-21 NOTE — TELEPHONE ENCOUNTER
pts daughter called, pt is extremely sick from the doxy, she has had such an upset stomach that she hasnt been able to take her other medications  She did not take the doxy today     Urine culture results are back  Can you advise  wilberto juarez

## 2019-08-21 NOTE — TELEPHONE ENCOUNTER
Patient informed   She stated that the best number to reach her is [phone] (cell phone)  Elisabeth Castellon MA   Sending to Nae Melo for [facility]

## 2019-08-21 NOTE — TELEPHONE ENCOUNTER
Only the preliminary is available, which does show infection, however the sensitivity is not available yet  I should have the final results by tomorrow  If she is not tolerating the Doxycycline, she can discontinue this and will decide if we need to put her on something else once I get the final report back    Lucille Janel

## 2019-08-22 LAB — BACTERIA UR CULT: ABNORMAL

## 2019-08-22 RX ORDER — AMOXICILLIN AND CLAVULANATE POTASSIUM 875; 125 MG/1; MG/1
1 TABLET, FILM COATED ORAL EVERY 12 HOURS SCHEDULED
Qty: 14 TABLET | Refills: 0 | Status: ON HOLD | OUTPATIENT
Start: 2019-08-22 | End: 2019-08-24 | Stop reason: SDUPTHER

## 2019-08-22 NOTE — TELEPHONE ENCOUNTER
Reviewed urine culture, resistant to tetracycline  She has chills and feels nauseas  She reports drinking adequate fluids  Will start on Augmentin  To call office with any changes or worsening symptoms    Bennett Contreras

## 2019-08-23 ENCOUNTER — APPOINTMENT (OUTPATIENT)
Dept: NON INVASIVE DIAGNOSTICS | Facility: HOSPITAL | Age: 79
End: 2019-08-23
Attending: INTERNAL MEDICINE
Payer: COMMERCIAL

## 2019-08-23 ENCOUNTER — HOSPITAL ENCOUNTER (OUTPATIENT)
Facility: HOSPITAL | Age: 79
Setting detail: OBSERVATION
Discharge: HOME | End: 2019-08-24
Attending: EMERGENCY MEDICINE | Admitting: INTERNAL MEDICINE
Payer: COMMERCIAL

## 2019-08-23 ENCOUNTER — APPOINTMENT (EMERGENCY)
Dept: RADIOLOGY | Facility: HOSPITAL | Age: 79
End: 2019-08-23
Payer: COMMERCIAL

## 2019-08-23 ENCOUNTER — APPOINTMENT (OUTPATIENT)
Dept: NON INVASIVE DIAGNOSTICS | Facility: HOSPITAL | Age: 79
End: 2019-08-23
Payer: COMMERCIAL

## 2019-08-23 ENCOUNTER — TELEPHONE (OUTPATIENT)
Dept: FAMILY MEDICINE CLINIC | Facility: CLINIC | Age: 79
End: 2019-08-23

## 2019-08-23 DIAGNOSIS — N39.0 ACUTE UTI: ICD-10-CM

## 2019-08-23 DIAGNOSIS — I48.91 ATRIAL FIBRILLATION WITH RAPID VENTRICULAR RESPONSE (HCC): ICD-10-CM

## 2019-08-23 DIAGNOSIS — R00.2 PALPITATIONS: Primary | ICD-10-CM

## 2019-08-23 DIAGNOSIS — N39.0 UTI (URINARY TRACT INFECTION): ICD-10-CM

## 2019-08-23 DIAGNOSIS — I48.0 PAROXYSMAL ATRIAL FIBRILLATION (HCC): ICD-10-CM

## 2019-08-23 LAB
ANION GAP SERPL CALCULATED.3IONS-SCNC: 12 MMOL/L (ref 4–13)
APTT PPP: 27 SECONDS (ref 24–33)
BACTERIA UR QL AUTO: ABNORMAL /HPF
BASOPHILS # BLD AUTO: 0.03 THOUSANDS/ΜL (ref 0–0.1)
BASOPHILS NFR BLD AUTO: 0 % (ref 0–1)
BILIRUB UR QL STRIP: NEGATIVE
BUN SERPL-MCNC: 15 MG/DL (ref 5–25)
CALCIUM SERPL-MCNC: 8.7 MG/DL (ref 8.3–10.1)
CHLORIDE SERPL-SCNC: 101 MMOL/L (ref 100–108)
CLARITY UR: CLEAR
CO2 SERPL-SCNC: 23 MMOL/L (ref 21–32)
COLOR UR: ABNORMAL
CREAT SERPL-MCNC: 1.09 MG/DL (ref 0.6–1.3)
EOSINOPHIL # BLD AUTO: 1.42 THOUSAND/ΜL (ref 0–0.61)
EOSINOPHIL NFR BLD AUTO: 8 % (ref 0–6)
ERYTHROCYTE [DISTWIDTH] IN BLOOD BY AUTOMATED COUNT: 13.5 % (ref 11.6–15.1)
GFR SERPL CREATININE-BSD FRML MDRD: 48 ML/MIN/1.73SQ M
GLUCOSE SERPL-MCNC: 164 MG/DL (ref 65–140)
GLUCOSE UR STRIP-MCNC: NEGATIVE MG/DL
HCT VFR BLD AUTO: 47.1 % (ref 34.8–46.1)
HGB BLD-MCNC: 14.7 G/DL (ref 11.5–15.4)
HGB UR QL STRIP.AUTO: NEGATIVE
IMM GRANULOCYTES # BLD AUTO: 0.14 THOUSAND/UL (ref 0–0.2)
IMM GRANULOCYTES NFR BLD AUTO: 1 % (ref 0–2)
INR PPP: 0.99 (ref 0.86–1.16)
KETONES UR STRIP-MCNC: NEGATIVE MG/DL
LACTATE SERPL-SCNC: 1.5 MMOL/L (ref 0.5–2)
LEUKOCYTE ESTERASE UR QL STRIP: ABNORMAL
LYMPHOCYTES # BLD AUTO: 2.3 THOUSANDS/ΜL (ref 0.6–4.47)
LYMPHOCYTES NFR BLD AUTO: 13 % (ref 14–44)
MCH RBC QN AUTO: 28.8 PG (ref 26.8–34.3)
MCHC RBC AUTO-ENTMCNC: 31.2 G/DL (ref 31.4–37.4)
MCV RBC AUTO: 92 FL (ref 82–98)
MONOCYTES # BLD AUTO: 1.16 THOUSAND/ΜL (ref 0.17–1.22)
MONOCYTES NFR BLD AUTO: 7 % (ref 4–12)
NEUTROPHILS # BLD AUTO: 12.66 THOUSANDS/ΜL (ref 1.85–7.62)
NEUTS SEG NFR BLD AUTO: 71 % (ref 43–75)
NITRITE UR QL STRIP: POSITIVE
NON-SQ EPI CELLS URNS QL MICRO: ABNORMAL /HPF
NRBC BLD AUTO-RTO: 0 /100 WBCS
PH UR STRIP.AUTO: 5.5 [PH]
PLATELET # BLD AUTO: 280 THOUSANDS/UL (ref 149–390)
PMV BLD AUTO: 10.3 FL (ref 8.9–12.7)
POTASSIUM SERPL-SCNC: 3.7 MMOL/L (ref 3.5–5.3)
PROT UR STRIP-MCNC: NEGATIVE MG/DL
PROTHROMBIN TIME: 10.4 SECONDS (ref 9.4–11.7)
RBC # BLD AUTO: 5.1 MILLION/UL (ref 3.81–5.12)
RBC #/AREA URNS AUTO: ABNORMAL /HPF
SODIUM SERPL-SCNC: 136 MMOL/L (ref 136–145)
SP GR UR STRIP.AUTO: <=1.005 (ref 1–1.03)
TROPONIN I SERPL-MCNC: <0.02 NG/ML
TROPONIN I SERPL-MCNC: <0.02 NG/ML
TSH SERPL DL<=0.05 MIU/L-ACNC: 3.13 UIU/ML (ref 0.36–3.74)
UROBILINOGEN UR QL STRIP.AUTO: 0.2 E.U./DL
WBC # BLD AUTO: 17.71 THOUSAND/UL (ref 4.31–10.16)
WBC #/AREA URNS AUTO: ABNORMAL /HPF

## 2019-08-23 PROCEDURE — 99204 OFFICE O/P NEW MOD 45 MIN: CPT | Performed by: INTERNAL MEDICINE

## 2019-08-23 PROCEDURE — 81001 URINALYSIS AUTO W/SCOPE: CPT | Performed by: EMERGENCY MEDICINE

## 2019-08-23 PROCEDURE — 87040 BLOOD CULTURE FOR BACTERIA: CPT | Performed by: EMERGENCY MEDICINE

## 2019-08-23 PROCEDURE — 85730 THROMBOPLASTIN TIME PARTIAL: CPT | Performed by: EMERGENCY MEDICINE

## 2019-08-23 PROCEDURE — 36415 COLL VENOUS BLD VENIPUNCTURE: CPT | Performed by: EMERGENCY MEDICINE

## 2019-08-23 PROCEDURE — 87086 URINE CULTURE/COLONY COUNT: CPT | Performed by: INTERNAL MEDICINE

## 2019-08-23 PROCEDURE — 84443 ASSAY THYROID STIM HORMONE: CPT | Performed by: EMERGENCY MEDICINE

## 2019-08-23 PROCEDURE — 80048 BASIC METABOLIC PNL TOTAL CA: CPT | Performed by: EMERGENCY MEDICINE

## 2019-08-23 PROCEDURE — 93306 TTE W/DOPPLER COMPLETE: CPT

## 2019-08-23 PROCEDURE — 93005 ELECTROCARDIOGRAM TRACING: CPT

## 2019-08-23 PROCEDURE — 87081 CULTURE SCREEN ONLY: CPT | Performed by: INTERNAL MEDICINE

## 2019-08-23 PROCEDURE — 83605 ASSAY OF LACTIC ACID: CPT | Performed by: EMERGENCY MEDICINE

## 2019-08-23 PROCEDURE — 96374 THER/PROPH/DIAG INJ IV PUSH: CPT

## 2019-08-23 PROCEDURE — 99220 PR INITIAL OBSERVATION CARE/DAY 70 MINUTES: CPT | Performed by: INTERNAL MEDICINE

## 2019-08-23 PROCEDURE — 85025 COMPLETE CBC W/AUTO DIFF WBC: CPT | Performed by: EMERGENCY MEDICINE

## 2019-08-23 PROCEDURE — 99285 EMERGENCY DEPT VISIT HI MDM: CPT

## 2019-08-23 PROCEDURE — 84484 ASSAY OF TROPONIN QUANT: CPT | Performed by: EMERGENCY MEDICINE

## 2019-08-23 PROCEDURE — 93306 TTE W/DOPPLER COMPLETE: CPT | Performed by: INTERNAL MEDICINE

## 2019-08-23 PROCEDURE — 96361 HYDRATE IV INFUSION ADD-ON: CPT

## 2019-08-23 PROCEDURE — 85610 PROTHROMBIN TIME: CPT | Performed by: EMERGENCY MEDICINE

## 2019-08-23 PROCEDURE — 84484 ASSAY OF TROPONIN QUANT: CPT | Performed by: INTERNAL MEDICINE

## 2019-08-23 PROCEDURE — 71045 X-RAY EXAM CHEST 1 VIEW: CPT

## 2019-08-23 RX ORDER — ONDANSETRON 2 MG/ML
4 INJECTION INTRAMUSCULAR; INTRAVENOUS EVERY 6 HOURS PRN
Status: DISCONTINUED | OUTPATIENT
Start: 2019-08-23 | End: 2019-08-24 | Stop reason: HOSPADM

## 2019-08-23 RX ORDER — CEFTRIAXONE 1 G/50ML
1000 INJECTION, SOLUTION INTRAVENOUS EVERY 24 HOURS
Status: DISCONTINUED | OUTPATIENT
Start: 2019-08-23 | End: 2019-08-24

## 2019-08-23 RX ORDER — PHENAZOPYRIDINE HYDROCHLORIDE 100 MG/1
200 TABLET, FILM COATED ORAL
Status: DISCONTINUED | OUTPATIENT
Start: 2019-08-23 | End: 2019-08-24 | Stop reason: HOSPADM

## 2019-08-23 RX ORDER — HEPARIN SODIUM 10000 [USP'U]/100ML
3-20 INJECTION, SOLUTION INTRAVENOUS
Status: DISCONTINUED | OUTPATIENT
Start: 2019-08-23 | End: 2019-08-23

## 2019-08-23 RX ORDER — LOSARTAN POTASSIUM 50 MG/1
50 TABLET ORAL DAILY
Status: DISCONTINUED | OUTPATIENT
Start: 2019-08-23 | End: 2019-08-24 | Stop reason: HOSPADM

## 2019-08-23 RX ORDER — HEPARIN SODIUM 1000 [USP'U]/ML
4000 INJECTION, SOLUTION INTRAVENOUS; SUBCUTANEOUS ONCE
Status: COMPLETED | OUTPATIENT
Start: 2019-08-23 | End: 2019-08-23

## 2019-08-23 RX ORDER — DILTIAZEM HYDROCHLORIDE 5 MG/ML
10 INJECTION INTRAVENOUS ONCE
Status: COMPLETED | OUTPATIENT
Start: 2019-08-23 | End: 2019-08-23

## 2019-08-23 RX ORDER — ATORVASTATIN CALCIUM 20 MG/1
20 TABLET, FILM COATED ORAL
Status: DISCONTINUED | OUTPATIENT
Start: 2019-08-23 | End: 2019-08-24 | Stop reason: HOSPADM

## 2019-08-23 RX ORDER — ASPIRIN 81 MG/1
81 TABLET ORAL DAILY
Status: DISCONTINUED | OUTPATIENT
Start: 2019-08-23 | End: 2019-08-24 | Stop reason: HOSPADM

## 2019-08-23 RX ADMIN — ATORVASTATIN CALCIUM 20 MG: 20 TABLET, FILM COATED ORAL at 16:14

## 2019-08-23 RX ADMIN — SODIUM CHLORIDE 1000 ML: 0.9 INJECTION, SOLUTION INTRAVENOUS at 09:20

## 2019-08-23 RX ADMIN — CEFTRIAXONE 1000 MG: 1 INJECTION, SOLUTION INTRAVENOUS at 16:05

## 2019-08-23 RX ADMIN — METOPROLOL TARTRATE 12.5 MG: 25 TABLET ORAL at 15:28

## 2019-08-23 RX ADMIN — PHENAZOPYRIDINE 200 MG: 100 TABLET ORAL at 16:15

## 2019-08-23 RX ADMIN — LOSARTAN POTASSIUM 50 MG: 50 TABLET ORAL at 15:28

## 2019-08-23 RX ADMIN — RIVAROXABAN 20 MG: 20 TABLET, FILM COATED ORAL at 16:14

## 2019-08-23 RX ADMIN — DILTIAZEM HYDROCHLORIDE 10 MG: 5 INJECTION INTRAVENOUS at 09:24

## 2019-08-23 RX ADMIN — HEPARIN SODIUM AND DEXTROSE 11.1 UNITS/KG/HR: 10000; 5 INJECTION INTRAVENOUS at 11:46

## 2019-08-23 RX ADMIN — HEPARIN SODIUM 4000 UNITS: 1000 INJECTION INTRAVENOUS; SUBCUTANEOUS at 11:47

## 2019-08-23 RX ADMIN — ASPIRIN 81 MG: 81 TABLET, COATED ORAL at 15:25

## 2019-08-23 RX ADMIN — METOPROLOL TARTRATE 12.5 MG: 25 TABLET ORAL at 21:14

## 2019-08-23 NOTE — TELEPHONE ENCOUNTER
Pt called stating she has been in afib all morning, this is something she has a history of however has not effected her for a long time  Pt is with her , she was crying on the phone and very upset  She was slightly out of breath   Pt was advised to go to the ER  Pts  will take her to [facility]

## 2019-08-23 NOTE — PLAN OF CARE
Problem: Potential for Falls  Goal: Patient will remain free of falls  Description  INTERVENTIONS:  - Assess patient frequently for physical needs  -  Identify cognitive and physical deficits and behaviors that affect risk of falls    -  [place] fall precautions as indicated by assessment   - Educate patient/family on patient safety including physical limitations  - Instruct patient to call for assistance with activity based on assessment  - Modify environment to reduce risk of injury  - Consider OT/PT consult to assist with strengthening/mobility  Outcome: Progressing     Problem: CARDIOVASCULAR - ADULT  Goal: Maintains optimal cardiac output and hemodynamic stability  Description  INTERVENTIONS:  - Monitor I/O, vital signs and rhythm  - Monitor for S/S and trends of decreased cardiac output  - Administer and titrate ordered vasoactive medications to optimize hemodynamic stability  - Assess quality of pulses, skin color and temperature  - Assess for signs of decreased coronary artery perfusion  - Instruct patient to report change in severity of symptoms  Outcome: Progressing  Goal: Absence of cardiac dysrhythmias or at baseline rhythm  Description  INTERVENTIONS:  - Continuous cardiac monitoring, vital signs, obtain 12 lead EKG if ordered  - Administer antiarrhythmic and heart rate control medications as ordered  - Monitor electrolytes and administer replacement therapy as ordered  Outcome: Progressing

## 2019-08-23 NOTE — ASSESSMENT & PLAN NOTE
Patient's recent urine cultures grew E coli  Started on Rocephin 1 gram IV daily  Patient also noted to have elevated white count

## 2019-08-23 NOTE — ED PROVIDER NOTES
History  Chief Complaint   Patient presents with    Palpitations     pt woke up at 5:30 feeling "fast heart rate"  Denies CP  c/o "a little SOB"  Speaks in full sentences  States has hx of afib "but normally converts" on her own  HPI    This is a 78 year female that presents today with palpitations  States she woke up around 530 this morning and felt palpitations which she knew she was in atrial fibrillation  States she normally knows when she goes into AFib  Denies any chest pain shortness of breath patient is speaking full sentences  Patient states she normally convertst on without medications  States she is not blood thinners because her cardiologist decided since she has short episodes of AFib to put her on any anticoagulation  Patient has no history of cardiac disease  Denies any fevers  Patient does have a urinary tract infection for which she is taking Augmentin  78 year female that presents today with palpitations  Patient currently came in with AFib with RVR  Will give her medications check basic blood work and reassess patient    Prior to Admission Medications   Prescriptions Last Dose Informant Patient Reported? Taking?    HYDROcodone-acetaminophen (NORCO) 5-325 mg per tablet Not Taking at Unknown time  Yes No   amoxicillin-clavulanate (AUGMENTIN) 875-125 mg per tablet 8/23/2019 at AM  No Yes   Sig: Take 1 tablet by mouth every 12 (twelve) hours for 7 days   aspirin (ASPIR-81) 81 mg EC tablet 8/22/2019 at pm  Yes Yes   Sig: Take 1 tablet by mouth daily   atorvastatin (LIPITOR) 20 mg tablet   Yes No   atorvastatin (LIPITOR) 40 mg tablet 8/22/2019 at pm  Yes Yes   doxycycline hyclate (VIBRAMYCIN) 100 mg capsule Not Taking at Unknown time  No No   Sig: Take 1 capsule (100 mg total) by mouth every 12 (twelve) hours for 10 days   Patient not taking: Reported on 8/23/2019   irbesartan (AVAPRO) 300 mg tablet 8/22/2019 at pm  Yes Yes   Sig: Take 300 mg by mouth daily     naproxen (NAPROSYN) 500 mg tablet Not Taking at Unknown time  Yes No   phenazopyridine (PYRIDIUM) 200 mg tablet 8/22/2019 at pm  No Yes   Sig: Take 1 tablet (200 mg total) by mouth 3 (three) times a day with meals   valsartan (DIOVAN) 80 mg tablet 8/22/2019 at pm  Yes Yes   Sig: Take 160 mg by mouth daily        Facility-Administered Medications: None       Past Medical History:   Diagnosis Date    [facility]fib ([facility] )     Cancer ([facility] )     Hyperlipidemia     Hypertension     UTI (urinary tract infection)        Past Surgical History:   Procedure Laterality Date    HYSTERECTOMY      JOINT REPLACEMENT         History reviewed  No pertinent family history  I have reviewed and agree with the history as documented  Social History     Tobacco Use    Smoking status: Never Smoker    Smokeless tobacco: Never Used   Substance Use Topics    Alcohol use: Not Currently     Frequency: Never    Drug use: No        Review of Systems   Constitutional: Negative  Negative for diaphoresis and fever  HENT: Negative  Respiratory: Negative  Negative for cough, shortness of breath and wheezing  Cardiovascular: Positive for palpitations  Negative for chest pain and leg swelling  Gastrointestinal: Negative for abdominal distention, abdominal pain, nausea and vomiting  Genitourinary: Negative  Musculoskeletal: Negative  Skin: Negative  Neurological: Negative  Psychiatric/Behavioral: Negative  All other systems reviewed and are negative  Physical Exam  Physical Exam   Constitutional: She is oriented to person, place, and time  She appears well-developed and well-nourished  No distress  HENT:   Head: Normocephalic and atraumatic  Eyes: Pupils are equal, round, and reactive to light  EOM are normal    Neck: Normal range of motion  Neck supple  Cardiovascular: Normal heart sounds  No murmur heard  Irregularly irregular rhythm with no murmur   Pulmonary/Chest: Effort normal and breath sounds normal  No stridor   No respiratory distress  Abdominal: Soft  Bowel sounds are normal  She exhibits no distension  There is no tenderness  There is no rebound and no guarding  Musculoskeletal: Normal range of motion  Neurological: She is alert and oriented to person, place, and time  Skin: Skin is warm and dry  Capillary refill takes less than 2 seconds  No rash noted  She is not diaphoretic  Psychiatric: She has a normal mood and affect  Vitals reviewed        Vital Signs  ED Triage Vitals   Temperature Pulse Respirations Blood Pressure SpO2   08/23/19 0854 08/23/19 0857 08/23/19 0857 08/23/19 0857 08/23/19 0857   98 °F (36 7 °C) (!) 136 20 151/70 93 %      Temp Source Heart Rate Source Patient Position - Orthostatic VS BP Location FiO2 (%)   08/23/19 0854 08/23/19 0857 08/23/19 0857 08/23/19 0857 --   Tympanic Monitor Lying Left arm       Pain Score       08/23/19 0857       No Pain           Vitals:    08/23/19 1130 08/23/19 1145 08/23/19 1200 08/23/19 1307   BP:   134/60 152/72   Pulse: 88 82 88    Patient Position - Orthostatic VS:   Lying Lying         Visual Acuity      ED Medications  Medications   heparin (porcine) 25,000 units in 250 mL infusion (premix) (11 1 Units/kg/hr × 90 kg (Order-Specific) Intravenous New Bag 8/23/19 1146)   sodium chloride 0 9 % bolus 1,000 mL (0 mL Intravenous Stopped 8/23/19 1110)   diltiazem (CARDIZEM) injection 10 mg (10 mg Intravenous Given 8/23/19 0924)   heparin (porcine) injection 4,000 Units (4,000 Units Intravenous Given 8/23/19 1147)       Diagnostic Studies  Results Reviewed     Procedure Component Value Units Date/Time    Urine Microscopic [[id number]]  (Abnormal) Collected:  08/23/19 1112    Lab Status:  Final result Specimen:  Urine, Clean Catch Updated:  08/23/19 1123     RBC, UA None Seen /hpf      WBC, UA 10-20 /hpf      Epithelial Cells Moderate /hpf      Bacteria, UA Occasional /hpf     UA w Reflex to Microscopic [[id number]]  (Abnormal) Collected:  08/23/19 1112    Lab Status:  Final result Specimen:  Urine, Clean Catch Updated:  08/23/19 1118     Color, UA Orange     Clarity, UA Clear     Specific Gravity, UA <=1 005     pH, UA 5 5     Leukocytes, UA Small     Nitrite, UA Positive     Protein, UA Negative mg/dl      Glucose, UA Negative mg/dl      Ketones, UA Negative mg/dl      Urobilinogen, UA 0 2 E U /dl      Bilirubin, UA Negative     Blood, UA Negative    APTT [[id number]]  (Normal) Collected:  08/23/19 0905    Lab Status:  Final result Specimen:  Blood from Arm, Right Updated:  08/23/19 1116     PTT 27 seconds     Protime-INR [[id number]]  (Normal) Collected:  08/23/19 0905    Lab Status:  Final result Specimen:  Blood from Arm, Right Updated:  08/23/19 1116     Protime 10 4 seconds      INR 0 99    Lactic acid, plasma x2 [[id number]]  (Normal) Collected:  08/23/19 1010    Lab Status:  Final result Specimen:  Blood from Hand, Right Updated:  08/23/19 1037     LACTIC ACID 1 5 mmol/L     Narrative:       Result may be elevated if tourniquet was used during collection  Blood culture #2 [[id number]] Collected:  08/23/19 1010    Lab Status: In process Specimen:  Blood from Hand, Right Updated:  08/23/19 1015    Blood culture #1 [[id number]] Collected:  08/23/19 0905    Lab Status:   In process Specimen:  Blood from Arm, Right Updated:  08/23/19 2002    Basic metabolic panel [[id number]]  (Abnormal) Collected:  08/23/19 0905    Lab Status:  Final result Specimen:  Blood from Arm, Right Updated:  08/23/19 1001     Sodium 136 mmol/L      Potassium 3 7 mmol/L      Chloride 101 mmol/L      CO2 23 mmol/L      ANION GAP 12 mmol/L      BUN 15 mg/dL      Creatinine 1 09 mg/dL      Glucose 164 mg/dL      Calcium 8 7 mg/dL      eGFR 48 ml/min/1 73sq m     Narrative:       [place] guidelines for Chronic Kidney Disease (CKD):     Stage 1 with normal or high GFR (GFR > 90 mL/min/1 73 square meters)    Stage 2 Mild CKD (GFR = 60-89 mL/min/1 73 square meters)    Stage 3A Moderate CKD (GFR = 45-59 mL/min/1 73 square meters)    Stage 3B Moderate CKD (GFR = 30-44 mL/min/1 73 square meters)    Stage 4 Severe CKD (GFR = 15-29 mL/min/1 73 square meters)    Stage 5 End Stage CKD (GFR <15 mL/min/1 73 square meters)  Note: GFR calculation is accurate only with a steady state creatinine    TSH, 3rd generation with Free T4 reflex [[id number]]  (Normal) Collected:  08/23/19 0905    Lab Status:  Final result Specimen:  Blood from Arm, Right Updated:  08/23/19 1001     TSH 3RD GENERATON 3 134 uIU/mL     Narrative:       Patients undergoing fluorescein dye angiography may retain small amounts of fluorescein in the body for 48-72 hours post procedure  Samples containing fluorescein can produce falsely depressed TSH values  If the patient had this procedure,a specimen should be resubmitted post fluorescein clearance        Troponin I [[id number]]  (Normal) Collected:  08/23/19 0905    Lab Status:  Final result Specimen:  Blood from Arm, Right Updated:  08/23/19 0956     Troponin I <0 02 ng/mL     CBC and differential [[id number]]  (Abnormal) Collected:  08/23/19 0905    Lab Status:  Final result Specimen:  Blood from Arm, Right Updated:  08/23/19 0937     WBC 17 71 Thousand/uL      RBC 5 10 Million/uL      Hemoglobin 14 7 g/dL      Hematocrit 47 1 %      MCV 92 fL      MCH 28 8 pg      MCHC 31 2 g/dL      RDW 13 5 %      MPV 10 3 fL      Platelets 421 Thousands/uL      nRBC 0 /100 WBCs      Neutrophils Relative 71 %      Immat GRANS % 1 %      Lymphocytes Relative 13 %      Monocytes Relative 7 %      Eosinophils Relative 8 %      Basophils Relative 0 %      Neutrophils Absolute 12 66 Thousands/µL      Immature Grans Absolute 0 14 Thousand/uL      Lymphocytes Absolute 2 30 Thousands/µL      Monocytes Absolute 1 16 Thousand/µL      Eosinophils Absolute 1 42 Thousand/µL      Basophils Absolute 0 03 Thousands/µL                  XR chest 1 view portable   Final Result by Pop Humphries MD (08/23 0957) No acute cardiopulmonary disease  Workstation performed: [id number]                    Procedures  Procedures       ED Course  ED Course as of Aug 23 1406   Fri Aug 23, 2019   1017 Procedure Note: EKG  Date/Time: 08/23/19 10:17 AM   Performed by: Ad Prakash by: Sobia Sapp   Indications / Diagnosis: paklpitations  ECG reviewed by me, the ED Provider: yes   The EKG demonstrates:  Rhythm:  Irregularly irregular  Intervals: normal intervals  Axis: normal axis  QRS/Blocks: normal QRS  ST Changes: No acute ST Changes, no STD/KAMERON         1017 Repeat EKG currently normal rate atrial flutter      1109 Spoke with Dr Mariza George who recommended admit the patient for cardioversion                                  MDM    Disposition  Final diagnoses:   Palpitations   Atrial fibrillation with rapid ventricular response ([facility] [facility] )   UTI (urinary tract infection)     Time reflects when diagnosis was documented in both MDM as applicable and the Disposition within this note     Time User Action Codes Description Comment    8/23/2019 11:01 AM RossyArpita Childskhdip Add [R00 2] Palpitations     8/23/2019 11:01 AM Arpita Lucaskhdip Add [I48 91] Atrial fibrillation with rapid ventricular response ([address] )     8/23/2019 11:01 AM Arpita Lucaskhdip Add [N39 0] UTI (urinary tract infection)       ED Disposition     ED Disposition Condition Date/Time Comment    Admit Stable Fri Aug 23, 2019 11:01 AM Case was discussed with medicine and the patient's admission status was agreed to be Admission Status: observation status to the service of Dr Bethany Cornejo           Follow-up Information    None         Current Discharge Medication List      CONTINUE these medications which have NOT CHANGED    Details   amoxicillin-clavulanate (AUGMENTIN) 875-125 mg per tablet Take 1 tablet by mouth every 12 (twelve) hours for 7 days  Qty: 14 tablet, Refills: 0    Associated Diagnoses: Acute UTI      aspirin (ASPIR-81) 81 mg EC tablet Take 1 tablet by mouth daily !! atorvastatin (LIPITOR) 40 mg tablet       irbesartan (AVAPRO) 300 mg tablet Take 300 mg by mouth daily        phenazopyridine (PYRIDIUM) 200 mg tablet Take 1 tablet (200 mg total) by mouth 3 (three) times a day with meals  Qty: 10 tablet, Refills: 0    Associated Diagnoses: UTI symptoms      valsartan (DIOVAN) 80 mg tablet Take 160 mg by mouth daily        !! atorvastatin (LIPITOR) 20 mg tablet Refills: 2      doxycycline hyclate (VIBRAMYCIN) 100 mg capsule Take 1 capsule (100 mg total) by mouth every 12 (twelve) hours for 10 days  Qty: 20 capsule, Refills: 0    Associated Diagnoses: UTI symptoms      HYDROcodone-acetaminophen (NORCO) 5-325 mg per tablet Refills: 0      naproxen (NAPROSYN) 500 mg tablet Refills: 1       !! - Potential duplicate medications found  Please discuss with provider  No discharge procedures on file      ED Provider  Electronically Signed by           Seema Schmidt MD  08/23/19 5468

## 2019-08-23 NOTE — CONSULTS
Consultation - Cardiology   [facility]     [facility] SPECIALTY [facility] - Louise Juni 78 y o  female MRN: [id number]  : 1940  Unit/Bed#: ED CT1 Encounter: [id number]      Assessment & Plan   1  Recurrent atrial fibrillation/atrial flutter with rapid ventricular rate   Patient has history of paroxysmal atrial fibrillation  She has not been on any antithrombotic therapy  She has been monitoring her heart closely  In her PN she went into atrial fibrillation earlier today  Will start the patient on low-dose Cardizem drip possible ANA LUISA guided cardioversion today  TSH is acceptable    2  Essential hypertension  Patient has longstanding history of essential hypertension  As per patient is well controlled with her medication  As per chart she is on Avapro and Diovan  She should be on 1 medication  We can consider starting her Avapro 300 mg daily  3  Dyslipidemia  Continue statins  Cholesterol profile is acceptable     4  Obesity with BMI around 39  Need to lose weight  Consider sleep screen overnight    5  Recurrent UTI  On Augmentin  Management as per medical team    Summary of Recommendations:        Monitor on tele for at least 48 hour  Serial cardiac enzymes  Electrolytes reviewed  IV Cardizem drip will start at 5 milligram/hour  IV heparin and then possible conversion to oral antithrombotic therapy at discharge  Possible ANA LUISA guided cardioversion today if anesthesia available  Echo Doppler  Sleep screen overnight  Continue Rx for UTI and dyslipidemia  Blood pressure medication with continued based on patient blood pressure  Keep the patient NPO for now  All those issues discussed with patient and patient's  at length  They agree with the plan  Discussed with ED and medical staff    Thank you for your consultation      Physician Requesting Consult: Seema Schmidt MD    Reason for Consult / Principal Problem:  Atrial fibrillation/atrial flutter with rapid ventricular rate    Inpatient consult to Cardiology  Consult performed by: Vicki Rodriguez MD  Consult ordered by: Vick Merrill MD          HPI: Raleigh Quijano is a 78y o  year old female who presents with palpitations and rapid heartbeat  Patient who has past medical history significant for essential hypertension, obesity with BMI around 39, dyslipidemia, history of previous atrial fibrillation but not on any antithrombotic therapy as she was only getting brief episodes and she monitored it closely had recently noted she was getting brief periods of atrial fibrillation and went into AFib earlier this morning  She called her primary care doctor who sent her to the ED  Unfortunately in the ED, patient took her medication with a spoon of applesauce  She has been suffering from UTI  She feel does palpitations  She was feeling fatigue and tired  She believe this episode happened this morning but she is not 100% sure  She knows when she gets these episodes  She has previously seen cardiology 4 years ago up in [place] area has not followed up regularly  She was given in the ED IV Cardizem currently heart rate is in 88 and she went into atrial flutter  She was started on IV heparin drip  No fever no chills no nausea no vomiting at this time  No chest pain  Her EKGs and other records reviewed  Review of Systems   Constitutional: Positive for fatigue  Negative for activity change, chills, diaphoresis, fever and unexpected weight change  HENT: Negative for congestion  Eyes: Negative for discharge and redness  Respiratory: Positive for shortness of breath  Negative for cough, chest tightness and wheezing  Exertional   Cardiovascular: Positive for palpitations  Negative for chest pain and leg swelling  Gastrointestinal: Negative for abdominal pain, diarrhea and nausea  Endocrine: Negative  Genitourinary: Negative for decreased urine volume and urgency          Recent UTI on antibiotics   Musculoskeletal: Negative  Negative for arthralgias, back pain and gait problem  Skin: Negative for rash and wound  Allergic/Immunologic: Negative  Neurological: Negative for dizziness, seizures, syncope, weakness, light-headedness and headaches  Hematological: Negative  Psychiatric/Behavioral: Negative for agitation and confusion  The patient is nervous/anxious  Historical Information   Past Medical History:   Diagnosis Date    [facility] )     Cancer ([facility] )     Hyperlipidemia     Hypertension     UTI (urinary tract infection)      Past Surgical History:   Procedure Laterality Date    HYSTERECTOMY      JOINT REPLACEMENT       Social History     Substance and Sexual Activity   Alcohol Use No     Social History     Substance and Sexual Activity   Drug Use No     Social History     Tobacco Use   Smoking Status Never Smoker   Smokeless Tobacco Never Used     Family History: History reviewed  No pertinent family history      Meds/Allergies    PTA meds:    (Not in a hospital admission)   Allergies   Allergen Reactions    Macrodantin [Nitrofurantoin Macrocrystal]     Nitrofurantoin     Oxycodone-Acetaminophen Vomiting    Sulfa Antibiotics        Current Facility-Administered Medications:     heparin (porcine) 25,000 units in 250 mL infusion (premix), 3-20 Units/kg/hr (Order-Specific), Intravenous, Titrated, Alina Ramsay MD    heparin (porcine) injection 4,000 Units, 4,000 Units, Intravenous, Once, Alina Ramsay MD    Current Outpatient Medications:     amoxicillin-clavulanate (AUGMENTIN) 875-125 mg per tablet, Take 1 tablet by mouth every 12 (twelve) hours for 7 days, Disp: 14 tablet, Rfl: 0    aspirin (ASPIR-81) 81 mg EC tablet, Take 1 tablet by mouth daily, Disp: , Rfl:     atorvastatin (LIPITOR) 20 mg tablet, , Disp: , Rfl: 2    atorvastatin (LIPITOR) 40 mg tablet, , Disp: , Rfl:     doxycycline hyclate (VIBRAMYCIN) 100 mg capsule, Take 1 capsule (100 mg total) by mouth every 12 (twelve) hours for 10 days (Patient not taking: Reported on 8/23/2019), Disp: 20 capsule, Rfl: 0    HYDROcodone-acetaminophen (NORCO) 5-325 mg per tablet, , Disp: , Rfl: 0    irbesartan (AVAPRO) 300 mg tablet, Take 300 mg by mouth daily  , Disp: , Rfl:     naproxen (NAPROSYN) 500 mg tablet, , Disp: , Rfl: 1    phenazopyridine (PYRIDIUM) 200 mg tablet, Take 1 tablet (200 mg total) by mouth 3 (three) times a day with meals, Disp: 10 tablet, Rfl: 0    valsartan (DIOVAN) 80 mg tablet, Take 160 mg by mouth daily  , Disp: , Rfl:     VTE Pharmacologic Prophylaxis:   IV heparin    Objective:   Vitals: Blood pressure 132/57, pulse 92, temperature 98 °F (36 7 °C), temperature source Tympanic, resp  rate 16, height 5' 7" (1 702 m), weight 114 kg (251 lb 5 2 oz), SpO2 94 %  Body mass index is 39 36 kg/m²  BP Readings from Last 3 Encounters:   08/23/19 132/57   08/19/19 140/80   12/17/18 130/80     Orthostatic Blood Pressures      Most Recent Value   Blood Pressure  132/57 filed at 08/23/2019 1100   Patient Position - Orthostatic VS  Lying filed at 08/23/2019 1100          Intake/Output Summary (Last 24 hours) at 8/23/2019 1129  Last data filed at 8/23/2019 1110  Gross per 24 hour   Intake 800 ml   Output --   Net 800 ml       Invasive Devices     Peripheral Intravenous Line            Peripheral IV 08/23/19 Right Forearm less than 1 day                  Physical Exam:   Physical Exam    Neurologic:  Alert & oriented x 3, no new focal deficits, Not in any acute distress,  Constitutional:  Well developed, well nourished, non-toxic appearance   Eyes:  Pupil equal and reacting to light, conjunctiva normal   HENT:  Atraumatic, oropharynx moist, Neck- normal range of motion, no tenderness, supple   Respiratory:  Bilateral air entry, mostly clear to auscultation  Cardiovascular:  S1-S2 irregular with a 2/6 ejection systolic murmur  GI:  Soft, nondistended, normal bowel sounds, nontender, no hepatosplenomegaly appreciated    Musculoskeletal:  No edema, no tenderness, no deformities  Skin:  Well hydrated, no rash   Lymphatic:  No lymphadenopathy noted   Extremities:  No edema    Labs:   Troponins: Results from last 7 days   Lab Units 08/23/19  0905   TROPONIN I ng/mL <0 02       CBC with diff: Results from last 7 days   Lab Units 08/23/19  0905   WBC Thousand/uL 17 71*   HEMOGLOBIN g/dL 14 7   HEMATOCRIT % 47 1*   MCV fL 92   PLATELETS Thousands/uL 280   MCH pg 28 8   MCHC g/dL 31 2*   RDW % 13 5   MPV fL 10 3   NRBC AUTO /100 WBCs 0       CMP: Results from last 7 days   Lab Units 08/23/19  0905   SODIUM mmol/L 136   POTASSIUM mmol/L 3 7   CHLORIDE mmol/L 101   CO2 mmol/L 23   ANION GAP mmol/L 12   BUN mg/dL 15   CREATININE mg/dL 1 09   CALCIUM mg/dL 8 7   EGFR ml/min/1 73sq m 48   GLUCOSE RANDOM mg/dL 164*       Coags:   Results from last 7 days   Lab Units 08/23/19  0905   PTT seconds 27   INR  0 99     TSH:    Results from last 7 days   Lab Units 08/23/19  0905   TSH 3RD GENERATON uIU/mL 3 134     Lipid Profile:   Lab Results   Component Value Date    CHOLESTEROL 193 05/10/2019     (H) 05/10/2019    LDLCALC 67 05/10/2019    TRIG 50 05/10/2019         Imaging & Testing   Cardiac testing:   Echo Doppler has been ordered      Imaging: I have personally reviewed pertinent reports  Xr Chest 1 View Portable    Result Date: 8/23/2019  Narrative: CHEST INDICATION:   Afib  Palpitations COMPARISON:  07/17/2016 EXAM PERFORMED/VIEWS:  XR CHEST PORTABLE 1 image FINDINGS: Cardiomediastinal silhouette appears slightly enlarged  There is no evidence of heart failure  The lungs are clear  No pneumothorax or pleural effusion  Osseous structures appear within normal limits for patient age  Impression: No acute cardiopulmonary disease  Workstation performed: [id number]     EKG/ Monitor: Personally reviewed  Initially EKG shows atrial fibrillation with heart rate 139 beats per minute  ST depression in precordial leads and nonspecific T-wave inversion  Repeat EKG after Cardizem shows patient is in atrial flutter with variable block heart rate around 88 beats per minute  Code Status: No Order  Advance Directive and Living Will:      POLST:        Danie Hawkins MD MD, [facility] [place]      "This note has been constructed using a voice recognition system  Therefore there may be syntax, spelling, and/or grammatical errors   Please call if you have any questions  "

## 2019-08-23 NOTE — H&P
H&P- Dona Coop 1940, 78 y o  female MRN: [id number]    Unit/Bed#: ICU 02 Encounter: [id number]    Primary Care Provider: Chris Will MD   Date and time admitted to hospital: 8/23/2019  8:53 AM        * Atrial fibrillation [facility])  Assessment & Plan  Patient presented with palpitations which started early this morning which have been persistent  Patient has history of proximal atrial fibrillation with intermittent palpitations not lasting long  Patient was found to be in atrial fibrillation with rapid ventricular rate  Patient was given Cardizem bolus and was started on Cardizem drip and heparin drip  Patient was seen by Cardiology and was scheduled for cardioversion but patient converted to sinus rhythm  Patient started on low-dose metoprolol and heparin drip was transitioned to Xarelto  Monitor serial cardiac enzymes  TSH level was normal  Overnight pulse oximetry to rule out sleep apnea  Follow-up 2D echo to rule out valvular abnormalities    UTI (urinary tract infection)  Assessment & Plan  Patient's recent urine cultures grew E coli  Started on Rocephin 1 gram IV daily  Patient also noted to have elevated white count    Benign hypertension  Assessment & Plan  Continue Arb  Patient also had on low-dose metoprolol  Mixed hyperlipidemia  Assessment & Plan  Continue Lipitor      VTE Prophylaxis: Rivaroxaban (Xarelto)  / reason for no mechanical VTE prophylaxis Patient on Xarelto   Code Status: Level 1 - Full Code    Anticipated Length of Stay:  Patient will be admitted on an Observation basis with an anticipated length of stay of  less than 2 midnights  Justification for Hospital Stay:  Atrial fibrillation with rapid ventricular rate, UTI    Total Time for Visit, including Counseling / Coordination of Care: 1 hour  Greater than 50% of this total time spent on direct patient counseling and coordination of care      Chief Complaint:   Palpitations (pt woke up at 5:30 feeling "fast heart rate"  Denies CP  c/o "a little SOB"  Speaks in full sentences  States has hx of afib "but normally converts" on her own )      History of Present Illness:    Dona Gutierrez is a 78 y o  female with a PMH of atrial fibrillation, hypertension, hyperlipidemia who presents with persistent palpitations which started around 5:30 a m  This morning  Patient reported she has history of atrial fibrillation and usually can feel it when she goes into atrial fibrillation  Her episodes usually break in less than half an hour  Patient had about 4 episodes over a period of 1 year  This morning patient felt dizzy with some palpitations which were persistent for couple of hours and patient called PCP who recommended the patient come to the ED  Patient denies any chest pain, shortness of breath, headache, dizziness  Patient reported that she has been having burning urination frequency and was recently started on doxycycline wall which she had intractable nausea and vomiting  Patient has been changed to Augmentin  Patient continues to complain of burning urination frequency and urgency  In the ED patient was afebrile and heart rate was 138  Patient was noted to be in atrial fibrillation with rapid ventricular rate and patient was started with Cardizem bolus following which patient was in atrial flutter  Patient was started on Cardizem drip     Review of Systems:    Review of Systems   Constitutional: Negative for activity change, appetite change, chills, diaphoresis, fatigue, fever and unexpected weight change  HENT: Negative for congestion, ear discharge, ear pain, facial swelling, hearing loss, mouth sores, nosebleeds, postnasal drip, rhinorrhea, sinus pressure, sneezing, sore throat, tinnitus, trouble swallowing and voice change  Eyes: Negative for photophobia, discharge, redness and visual disturbance  Respiratory: Negative for cough, chest tightness, shortness of breath, wheezing and stridor      Cardiovascular: Positive for palpitations  Negative for chest pain and leg swelling  Gastrointestinal: Negative for abdominal distention, abdominal pain, anal bleeding, blood in stool, constipation, diarrhea, nausea and vomiting  Endocrine: Negative for polydipsia, polyphagia and polyuria  Genitourinary: Positive for urgency  Negative for decreased urine volume, difficulty urinating, dysuria, flank pain, hematuria, menstrual problem, pelvic pain, vaginal bleeding and vaginal discharge  Frequency   Musculoskeletal: Negative for arthralgias, back pain and neck stiffness  Skin: Negative for pallor and rash  Neurological: Negative for dizziness, seizures, facial asymmetry, speech difficulty, light-headedness, numbness and headaches  Hematological: Negative for adenopathy  Psychiatric/Behavioral: Negative for agitation and confusion  Past Medical and Surgical History:     Past Medical History:   Diagnosis Date    A-fib ([facility] )     Cancer ([facility] )     Hyperlipidemia     Hypertension     UTI (urinary tract infection)        Past Surgical History:   Procedure Laterality Date    HYSTERECTOMY      JOINT REPLACEMENT         Meds/Allergies:    Prior to Admission medications    Medication Sig Start Date End Date Taking?  Authorizing Provider   amoxicillin-clavulanate (AUGMENTIN) 875-125 mg per tablet Take 1 tablet by mouth every 12 (twelve) hours for 7 days 8/22/19 8/29/19 Yes GSU Coleman   aspirin (ASPIR-81) 81 mg EC tablet Take 1 tablet by mouth daily   Yes Historical Provider, MD   atorvastatin (LIPITOR) 40 mg tablet  7/13/19  Yes Historical Provider, MD   irbesartan (AVAPRO) 300 mg tablet Take 300 mg by mouth daily   11/9/18  Yes Historical Provider, MD   phenazopyridine (PYRIDIUM) 200 mg tablet Take 1 tablet (200 mg total) by mouth 3 (three) times a day with meals 8/19/19  Yes GUS Coleman   valsartan (DIOVAN) 80 mg tablet Take 160 mg by mouth daily   9/14/17  Yes Historical Provider, MD atorvastatin (LIPITOR) 20 mg tablet  10/11/18   Historical Provider, MD   doxycycline hyclate (VIBRAMYCIN) 100 mg capsule Take 1 capsule (100 mg total) by mouth every 12 (twelve) hours for 10 days  Patient not taking: Reported on 8/23/2019 8/19/19 8/29/19  GUS Coleman   HYDROcodone-acetaminophen [facility]) 5-325 mg per tablet  7/15/19   Historical Provider, MD   naproxen (NAPROSYN) 500 mg tablet  7/17/19   Historical Provider, MD       Allergies: Allergies   Allergen Reactions    Macrodantin [Nitrofurantoin Macrocrystal] Vomiting    Nitrofurantoin     Oxycodone-Acetaminophen Vomiting    Sulfa Antibiotics        Social History:     Marital Status: /Civil Union   Substance Use History:   Social History     Substance and Sexual Activity   Alcohol Use Not Currently    Frequency: Never     Social History     Tobacco Use   Smoking Status Never Smoker   Smokeless Tobacco Never Used     Social History     Substance and Sexual Activity   Drug Use No       Family History:    History reviewed  No pertinent family history  Physical Exam:     Vitals:   Blood Pressure: 124/72 (08/23/19 1527)  Pulse: 73 (08/23/19 1527)  Temperature: (!) 97 °F (36 1 °C) (08/23/19 1540)  Temp Source: Temporal (08/23/19 1540)  Respirations: 22 (08/23/19 1527)  Height: 5' 9" (175 3 cm) (08/23/19 1307)  Weight - Scale: 118 kg (259 lb 11 2 oz) (08/23/19 1307)  SpO2: 97 % (08/23/19 1527)    Physical Exam   Constitutional: No distress  HENT:   Head: Normocephalic and atraumatic  Nose: Nose normal    Eyes: Pupils are equal, round, and reactive to light  Conjunctivae are normal    Neck: Normal range of motion  Neck supple  Cardiovascular: Normal rate, regular rhythm and normal heart sounds  Pulmonary/Chest: Effort normal and breath sounds normal  No respiratory distress  She has no wheezes  She has no rales  Abdominal: Soft  Bowel sounds are normal  She exhibits no distension  There is no tenderness   There is no rebound and no guarding  Musculoskeletal: She exhibits no edema  Neurological: She is alert  No cranial nerve deficit  Skin: Skin is warm and dry  No rash noted  Additional Data:     Lab Results: I have personally reviewed pertinent films in PACS    Results from last 7 days   Lab Units 08/23/19  0905   WBC Thousand/uL 17 71*   HEMOGLOBIN g/dL 14 7   HEMATOCRIT % 47 1*   PLATELETS Thousands/uL 280   NEUTROS PCT % 71     Results from last 7 days   Lab Units 08/23/19  0905   SODIUM mmol/L 136   POTASSIUM mmol/L 3 7   CHLORIDE mmol/L 101   CO2 mmol/L 23   BUN mg/dL 15   CREATININE mg/dL 1 09   CALCIUM mg/dL 8 7     Results from last 7 days   Lab Units 08/23/19  0905   INR  0 99     Results from last 7 days   Lab Units 08/23/19  0905   TROPONIN I ng/mL <0 02         Results from last 7 days   Lab Units 08/23/19  1010   LACTIC ACID mmol/L 1 5       Imaging: I have personally reviewed pertinent films in PACS    XR chest 1 view portable   Final Result by Morales Blood MD (08/23 0957)      No acute cardiopulmonary disease  Workstation performed: [id number]             XR chest 1 view portable   Final Result      No acute cardiopulmonary disease  Workstation performed: [id number]             EKG, Pathology, and Other Studies Reviewed on Admission:   · EKG:  EKG showed atrial fibrillation with rapid heart rate of 139 beats per minute with ST depression in precordial leads  Repeat EKG after Cardizem showed atrial flutter with variable block  · Repeat EKG in ICU showed normal sinus rhythm    Allscripts / Epic Records Reviewed: Yes     ** Please Note: This note has been constructed using a voice recognition system   **

## 2019-08-24 VITALS
SYSTOLIC BLOOD PRESSURE: 119 MMHG | RESPIRATION RATE: 18 BRPM | BODY MASS INDEX: 38.47 KG/M2 | TEMPERATURE: 98.5 F | HEART RATE: 78 BPM | DIASTOLIC BLOOD PRESSURE: 57 MMHG | OXYGEN SATURATION: 91 % | HEIGHT: 69 IN | WEIGHT: 259.7 LBS

## 2019-08-24 PROBLEM — G47.33 OSA (OBSTRUCTIVE SLEEP APNEA): Status: ACTIVE | Noted: 2019-08-24

## 2019-08-24 LAB
ANION GAP SERPL CALCULATED.3IONS-SCNC: 7 MMOL/L (ref 4–13)
BACTERIA UR CULT: NORMAL
BUN SERPL-MCNC: 18 MG/DL (ref 5–25)
CALCIUM SERPL-MCNC: 8.5 MG/DL (ref 8.3–10.1)
CHLORIDE SERPL-SCNC: 106 MMOL/L (ref 100–108)
CO2 SERPL-SCNC: 24 MMOL/L (ref 21–32)
CREAT SERPL-MCNC: 0.85 MG/DL (ref 0.6–1.3)
ERYTHROCYTE [DISTWIDTH] IN BLOOD BY AUTOMATED COUNT: 13.7 % (ref 11.6–15.1)
GFR SERPL CREATININE-BSD FRML MDRD: 65 ML/MIN/1.73SQ M
GLUCOSE SERPL-MCNC: 108 MG/DL (ref 65–140)
HCT VFR BLD AUTO: 41.6 % (ref 34.8–46.1)
HGB BLD-MCNC: 12.6 G/DL (ref 11.5–15.4)
MCH RBC QN AUTO: 28.7 PG (ref 26.8–34.3)
MCHC RBC AUTO-ENTMCNC: 30.3 G/DL (ref 31.4–37.4)
MCV RBC AUTO: 95 FL (ref 82–98)
MRSA NOSE QL CULT: NORMAL
PLATELET # BLD AUTO: 215 THOUSANDS/UL (ref 149–390)
PMV BLD AUTO: 10.3 FL (ref 8.9–12.7)
POTASSIUM SERPL-SCNC: 4.1 MMOL/L (ref 3.5–5.3)
RBC # BLD AUTO: 4.39 MILLION/UL (ref 3.81–5.12)
SODIUM SERPL-SCNC: 137 MMOL/L (ref 136–145)
WBC # BLD AUTO: 12.73 THOUSAND/UL (ref 4.31–10.16)

## 2019-08-24 PROCEDURE — 99214 OFFICE O/P EST MOD 30 MIN: CPT | Performed by: INTERNAL MEDICINE

## 2019-08-24 PROCEDURE — 85027 COMPLETE CBC AUTOMATED: CPT | Performed by: INTERNAL MEDICINE

## 2019-08-24 PROCEDURE — 94762 N-INVAS EAR/PLS OXIMTRY CONT: CPT

## 2019-08-24 PROCEDURE — 99225 PR SBSQ OBSERVATION CARE/DAY 25 MINUTES: CPT | Performed by: INTERNAL MEDICINE

## 2019-08-24 PROCEDURE — 80048 BASIC METABOLIC PNL TOTAL CA: CPT | Performed by: INTERNAL MEDICINE

## 2019-08-24 RX ORDER — AMOXICILLIN AND CLAVULANATE POTASSIUM 875; 125 MG/1; MG/1
1 TABLET, FILM COATED ORAL EVERY 12 HOURS SCHEDULED
Qty: 14 TABLET | Refills: 0 | Status: SHIPPED | OUTPATIENT
Start: 2019-08-24 | End: 2019-08-26

## 2019-08-24 RX ORDER — METOPROLOL SUCCINATE 25 MG/1
25 TABLET, EXTENDED RELEASE ORAL DAILY
Qty: 30 TABLET | Refills: 0 | Status: SHIPPED | OUTPATIENT
Start: 2019-08-24 | End: 2019-09-30 | Stop reason: SDUPTHER

## 2019-08-24 RX ORDER — CEFTRIAXONE 1 G/50ML
1000 INJECTION, SOLUTION INTRAVENOUS ONCE
Status: COMPLETED | OUTPATIENT
Start: 2019-08-24 | End: 2019-08-24

## 2019-08-24 RX ADMIN — PHENAZOPYRIDINE 200 MG: 100 TABLET ORAL at 08:31

## 2019-08-24 RX ADMIN — ASPIRIN 81 MG: 81 TABLET, COATED ORAL at 08:31

## 2019-08-24 RX ADMIN — CEFTRIAXONE 1000 MG: 1 INJECTION, SOLUTION INTRAVENOUS at 12:26

## 2019-08-24 RX ADMIN — LOSARTAN POTASSIUM 50 MG: 50 TABLET ORAL at 08:31

## 2019-08-24 RX ADMIN — PHENAZOPYRIDINE 200 MG: 100 TABLET ORAL at 12:26

## 2019-08-24 RX ADMIN — METOPROLOL TARTRATE 12.5 MG: 25 TABLET ORAL at 08:31

## 2019-08-24 NOTE — ASSESSMENT & PLAN NOTE
Patient's recent urine cultures grew E coli  Patient received 2 days of IV Rocephin during the hospital stay  White count has improved  Patient could not resume Augmentin for another 4 days

## 2019-08-24 NOTE — ASSESSMENT & PLAN NOTE
Suspected obstructive sleep apnea    Patient's overnight pulse oximetry showed AHI index of 7 5, lows desaturation of 83% and patient spent 156 minutes with O2 saturation was 90%  Outpatient of well to Sleep center given

## 2019-08-24 NOTE — UTILIZATION REVIEW
Initial Clinical Review    Admission: Date/Time/Statement:    8/23/19 AT 1102 OBSERVATION  Orders Placed This Encounter   Procedures    Place in Observation (expected length of stay for this patient is less than two midnights)     Standing Status:   Standing     Number of Occurrences:   1     Order Specific Question:   Admitting Physician     Answer:   Екатерина Davila     Order Specific Question:   Level of Care     Answer:   Med Surg [16]     ED Arrival Information     Expected Arrival Acuity Means of Arrival Escorted By Service Admission Type    - 8/23/2019 08:52 Urgent Walk-In Family Member General Medicine Urgent    Arrival Complaint    a fib     Chief Complaint   Patient presents with    Palpitations     pt woke up at 5:30 feeling "fast heart rate"  Denies CP  c/o "a little SOB"  Speaks in full sentences  States has hx of afib "but normally converts" on her own  Chief Complaint:   Palpitations (pt woke up at 5:30 feeling "fast heart rate"  Denies CP  c/o "a little SOB"  Speaks in full sentences  States has hx of afib "but normally converts" on her own )      History of Present Illness:   Ligia Ashley is a 78 y o  female with a PMH of atrial fibrillation, hypertension, hyperlipidemia who presents with persistent palpitations which started around 5:30 a m  This morning  Patient reported she has history of atrial fibrillation and usually can feel it when she goes into atrial fibrillation  Her episodes usually break in less than half an hour  Patient had about 4 episodes over a period of 1 year  This morning patient felt dizzy with some palpitations which were persistent for couple of hours and patient called PCP who recommended the patient come to the ED  Patient denies any chest pain, shortness of breath, headache, dizziness  Patient reported that she has been having burning urination frequency and was recently started on doxycycline wall which she had intractable nausea and vomiting  Patient has been changed to Augmentin  Patient continues to complain of burning urination frequency and urgency  In the ED patient was afebrile and heart rate was 138  Patient was noted to be in atrial fibrillation with rapid ventricular rate and was given IV Cardizem 10 mg bolus following which patient was in atrial flutter  Converted to SR BEFORE being started on IV Cardizem drip      Review of Systems:  Respiratory: Negative for cough, chest tightness, shortness of breath, wheezing and stridor  Cardiovascular: Positive for palpitations  Genitourinary: Positive for urgency  Frequency     Assessment/Plan:   Atrial fibrillation [facility])  Assessment & Plan  Patient presented with palpitations which started early this morning which have been persistent  Patient has history of proximal atrial fibrillation with intermittent palpitations not lasting long  Patient was found to be in atrial fibrillation with rapid ventricular rate  Patient was given Cardizem bolus and was started on Cardizem drip and heparin drip  Patient was seen by Cardiology and was scheduled for cardioversion but patient converted to sinus rhythm  Patient started on low-dose metoprolol and heparin drip was transitioned to Xarelto  Monitor serial cardiac enzymes  TSH level was normal  Overnight pulse oximetry to rule out sleep apnea  Follow-up 2D echo to rule out valvular abnormalities     UTI (urinary tract infection)  Assessment & Plan  Patient's recent urine cultures grew E coli  Started on Rocephin 1 gram IV daily  Patient also noted to have elevated white count      VTE Prophylaxis: Rivaroxaban (Xarelto)  / reason for no mechanical VTE prophylaxis Patient on Xarelto      Anticipated Length of Stay:  Patient will be admitted on an Observation basis with an anticipated length of stay of  less than 2 midnights      Justification for Hospital Stay:  Atrial fibrillation with rapid ventricular rate, UTI    ED Triage Vitals   Temperature Pulse Respirations Blood Pressure SpO2   08/23/19 0854 08/23/19 0857 08/23/19 0857 08/23/19 0857 08/23/19 0857   98 °F (36 7 °C) (!) 136 20 151/70 93 %      Temp Source Heart Rate Source Patient Position - Orthostatic VS BP Location FiO2 (%)   08/23/19 0854 08/23/19 0857 08/23/19 0857 08/23/19 0857 --   Tympanic Monitor Lying Left arm     Wt Readings from Last 1 Encounters:   08/23/19 118 kg (259 lb 11 2 oz)     Additional Vital Signs:   08/24/19 0749  98 6 °F (37 °C)  77  18  125/64  --  92 %    08/24/19 0340  98 1 °F (36 7 °C)  90  18  127/64  --  91 %    08/23/19 2328  97 °F (36 1 °C)Abnormal   87  18  125/59  --  94 %    08/23/19 1928  98 °F (36 7 °C)  76  18  130/71  --  95 %    08/23/19 1540  97 °F (36 1 °C)Abnormal   --  --  --  --  --    08/23/19 1307  96 8 °F (36 °C)Abnormal   --  20  152/72  --  96 %    08/23/19 1200  --  88  17  134/60  --  96 %      Pertinent Labs/Diagnostic Test Results:   Results from last 7 days   Lab Units 08/24/19  0519 08/23/19  0905   WBC Thousand/uL 12 73* 17 71*   HEMOGLOBIN g/dL 12 6 14 7   HEMATOCRIT % 41 6 47 1*   PLATELETS Thousands/uL 215 280   NEUTROS ABS Thousands/µL  --  12 66*     Results from last 7 days   Lab Units 08/24/19  0519 08/23/19  0905   SODIUM mmol/L 137 136   POTASSIUM mmol/L 4 1 3 7   CHLORIDE mmol/L 106 101   CO2 mmol/L 24 23   ANION GAP mmol/L 7 12   BUN mg/dL 18 15   CREATININE mg/dL 0 85 1 09   EGFR ml/min/1 73sq m 65 48   CALCIUM mg/dL 8 5 8 7     Results from last 7 days   Lab Units 08/24/19  0519 08/23/19  0905   GLUCOSE RANDOM mg/dL 108 164*     Results from last 7 days   Lab Units 08/23/19  1618 08/23/19  0905   TROPONIN I ng/mL <0 02 <0 02     Results from last 7 days   Lab Units 08/23/19  0905   PROTIME seconds 10 4   INR  0 99   PTT seconds 27       Results from last 7 days   Lab Units 08/23/19  1112   CLARITY UA  Clear   COLOR UA  [place]   SPEC GRAV UA  <=1 005   PH UA  5 5   GLUCOSE UA mg/dl Negative   KETONES UA mg/dl Negative   BLOOD UA  Negative PROTEIN UA mg/dl Negative   NITRITE UA  Positive*   BILIRUBIN UA  Negative   UROBILINOGEN UA E U /dl 0 2   LEUKOCYTES UA  Small*   WBC UA /hpf 10-20*   RBC UA /hpf None Seen   BACTERIA UA /hpf Occasional   EPITHELIAL CELLS WET PREP /hpf Moderate*     Results from last 7 days   Lab Units 08/19/19 1957   URINE CULTURE  >100,000 cfu/ml Escherichia coli*     EKG (Per Card Consult):  Atrial fibrillation with heart rate 139 beats per minute  ST depression in precordial leads and nonspecific T-wave inversion  Repeat EKG after Cardizem shows patient is in atrial flutter with variable block heart rate around 88 beats per minute      ED Treatment:   Medication Administration from 08/23/2019 0852 to 08/23/2019 1302       Date/Time Order Dose Route Action     08/23/2019 1110 sodium chloride 0 9 % bolus 1,000 mL 0 mL Intravenous Stopped     08/23/2019 0920 sodium chloride 0 9 % bolus 1,000 mL 1,000 mL Intravenous New Bag     08/23/2019 0924 diltiazem (CARDIZEM) injection 10 mg 10 mg Intravenous Given     08/23/2019 1117 heparin (ACS LOW)   Intravenous Not Given     08/23/2019 1147 heparin (porcine) injection 4,000 Units 4,000 Units Intravenous Given     08/23/2019 1146 heparin (porcine) 25,000 units in 250 mL infusion (premix) 11 1 Units/kg/hr Intravenous New Bag     Past Medical History:   Diagnosis Date    A-fib ([place] [address] )     Cancer ([place] [address] )     Hyperlipidemia     Hypertension     UTI (urinary tract infection)      Present on Admission:   Atrial fibrillation ([place] [address] )   Benign hypertension   Mixed hyperlipidemia    Admitting Diagnosis: Palpitations [R00 2]  UTI (urinary tract infection) [N39 0]  Atrial fibrillation with rapid ventricular response ([place] [address] ) [I48 91]    Age/Sex: 78 y o  female    Admission Orders:  TELEMETRY  Up + OOB as Tolerated  Serial Troponin q3hrs    IV heparin (porcine) 25,000 units in 250 mL infusion (premix)    Ordered Dose: 3-20 Units/kg/hr × 90 kg (Order-Specific) Route: Intravenous Frequency: Titrated @ 2 7-18 mL/hr   Scheduled Start Date/Time: 08/23/19 1115 End Date/Time: 08/23/19 3708       Current Facility-Administered Medications:  aspirin 81 mg Oral Daily   atorvastatin 20 mg Oral Daily With Dinner   cefTRIAXone 1,000 mg Intravenous Q24H   losartan 50 mg Oral Daily   metoprolol tartrate 12 5 mg Oral Q12H ALVINO   ondansetron 4 mg Intravenous Q6H PRN   phenazopyridine 200 mg Oral TID With Meals   rivaroxaban 20 mg Oral Daily With [facility] Utilization Review Department  Phone: [phone]; Fax [phone]  [email or web address]  ATTENTION: Please call with any questions or concerns to [phone]  and carefully listen to the prompts so that you are directed to the right person  Send all requests for admission clinical reviews, approved or denied determinations and any other requests to fax [phone]   All voicemails are confidential

## 2019-08-24 NOTE — ASSESSMENT & PLAN NOTE
Patient presented with palpitations  Patient has history of paroxysmal atrial fibrillation with intermittent palpitations not lasting long  Patient was found to be in atrial fibrillation with rapid ventricular rate  Patient was given Cardizem bolus and was started on Cardizem drip and heparin drip  Patient was seen by Cardiology and was scheduled for cardioversion but patient converted to sinus rhythm  Patient started on low-dose metoprolol and heparin drip was transitioned to Xarelto  Serial cardiac enzymes were negative  TSH level was normal  Overnight pulse oximetry showed suspicion for obstructive sleep apnea  Patient will need outpatient follow-up for sleep study    2D echo showed normal left atrial size with EF of 55- 60% with mild concentric hypertrophy

## 2019-08-24 NOTE — PROGRESS NOTES
Progress Note - Cardiology   [facility]     Ulisses Alfredo 78 y o  female MRN: [id number]  : 1940  Unit/Bed#: [address] [place] [address] Encounter: [id number]    Assessment and Plan:   1  Paroxysmal atrial fibrillation:  Patient self converted last evening  CHADS2 Vasc = 3  Recommend continuing beta-blocker and Xarelto  Patient is cleared for discharge today  Will follow up in the outpatient setting  2  Hypertension:  Blood pressures have been stable on admission  Yesterday a chart review appears patient was taking Avapro and Diovan  Would continue just Avapro at time of discharge rest of pressures did stable during hospitalization  3  UTI:  Continue antibiotics as she was taking pre-hospital     4  Dyslipidemia:  Continue statin therapy    5  Abnormal overnight sleep screen with AHI of 8:  Recommend formal complete sleep study in the outpatient setting  Subjective / Objective:   Patient seen and examined  Maintaining sinus rhythm  Overnight to study with AHI of 8  Patient states that her  has noted that she does have snoring at bedtime  Tolerating medication at this time  Vitals: Blood pressure 119/57, pulse 78, temperature 98 5 °F (36 9 °C), temperature source Tympanic, resp  rate 18, height 5' 9" (1 753 m), weight 118 kg (259 lb 11 2 oz), SpO2 91 %  Vitals:    19 1307 19   Weight: 118 kg (259 lb 11 2 oz) 118 kg (259 lb 11 2 oz)     Body mass index is 38 35 kg/m²  BP Readings from Last 3 Encounters:   19 119/57   19 140/80   18 130/80     Orthostatic Blood Pressures      Most Recent Value   Blood Pressure  119/57 filed at 2019 1100   Patient Position - Orthostatic VS  Lying filed at 2019 1100        I/O        07 -  0700  07 -  07 07 -  0700    P  O   300 180    I V  (mL/kg)  10 (0 1)     IV Piggyback  800     Total Intake(mL/kg)  1110 (9 4) 180 (1 5)    Urine (mL/kg/hr)  700 Total Output  700     Net  +410 +180               Invasive Devices     Peripheral Intravenous Line            Peripheral IV 08/23/19 Right Forearm 1 day                  Intake/Output Summary (Last 24 hours) at 8/24/2019 1150  Last data filed at 8/24/2019 0801  Gross per 24 hour   Intake 490 ml   Output 600 ml   Net -110 ml         Physical Exam:   Physical Exam   Constitutional: She is oriented to person, place, and time  She appears well-developed and well-nourished  No distress  HENT:   Head: Normocephalic and atraumatic  Right Ear: External ear normal    Left Ear: External ear normal    Eyes: Pupils are equal, round, and reactive to light  Conjunctivae are normal  Right eye exhibits no discharge  Left eye exhibits no discharge  No scleral icterus  Neck: Normal range of motion  Neck supple  No JVD present  No thyromegaly present  Cardiovascular: Normal rate, regular rhythm, normal heart sounds and intact distal pulses  Pulmonary/Chest: Effort normal and breath sounds normal  No respiratory distress  She has no rales  Abdominal: Soft  Bowel sounds are normal  She exhibits no distension and no mass  Neurological: She is alert and oriented to person, place, and time  Skin: Skin is warm and dry  Capillary refill takes less than 2 seconds  She is not diaphoretic  Psychiatric: She has a normal mood and affect  Nursing note and vitals reviewed                Medications/ Allergies:     Current Facility-Administered Medications:  aspirin 81 mg Oral Daily GUS Crocker    atorvastatin 20 mg Oral Daily With Dinner GUS Crocker    cefTRIAXone 1,000 mg Intravenous Q24H GUS Crocker Last Rate: 1,000 mg (08/23/19 1605)   losartan 50 mg Oral Daily GUS Crocker    metoprolol tartrate 12 5 mg Oral Q12H [facility] Breanne Guajardo MD    ondansetron 4 mg Intravenous Q6H PRN GUS Crocker    phenazopyridine 200 mg Oral TID With Meals GUS Crocker    rivaroxaban 20 mg Oral Daily With Deion Calzada MD        ondansetron 4 mg Q6H PRN     Allergies   Allergen Reactions    Macrodantin [Nitrofurantoin Macrocrystal] Vomiting    Nitrofurantoin     Oxycodone-Acetaminophen Vomiting    Sulfa Antibiotics        VTE Pharmacologic Prophylaxis:   Xarelto    Labs:   Troponins:  Results from last 7 days   Lab Units 08/23/19  1618 08/23/19  0905   TROPONIN I ng/mL <0 02 <0 02     CBC with diff:  Results from last 7 days   Lab Units 08/24/19  0519 08/23/19  0905   WBC Thousand/uL 12 73* 17 71*   HEMOGLOBIN g/dL 12 6 14 7   HEMATOCRIT % 41 6 47 1*   MCV fL 95 92   PLATELETS Thousands/uL 215 280   MCH pg 28 7 28 8   MCHC g/dL 30 3* 31 2*   RDW % 13 7 13 5   MPV fL 10 3 10 3   NRBC AUTO /100 WBCs  --  0     CMP:  Results from last 7 days   Lab Units 08/24/19  0519 08/23/19  0905   SODIUM mmol/L 137 136   POTASSIUM mmol/L 4 1 3 7   CHLORIDE mmol/L 106 101   CO2 mmol/L 24 23   ANION GAP mmol/L 7 12   BUN mg/dL 18 15   CREATININE mg/dL 0 85 1 09   CALCIUM mg/dL 8 5 8 7   EGFR ml/min/1 73sq m 65 48     Coags:  Results from last 7 days   Lab Units 08/23/19  0905   PTT seconds 27   INR  0 99     TSH:  Results from last 7 days   Lab Units 08/23/19  0905   TSH 3RD GENERATON uIU/mL 3 134       Imaging & Testing   I have personally reviewed pertinent reports  Xr Chest 1 View Portable    Result Date: 8/23/2019  Narrative: CHEST INDICATION:   Afib  Palpitations COMPARISON:  07/17/2016 EXAM PERFORMED/VIEWS:  XR CHEST PORTABLE 1 image FINDINGS: Cardiomediastinal silhouette appears slightly enlarged  There is no evidence of heart failure  The lungs are clear  No pneumothorax or pleural effusion  Osseous structures appear within normal limits for patient age  Impression: No acute cardiopulmonary disease  Workstation performed: [id number]        EKG / Monitor: Personally reviewed      Sinus rhythm on telemetry    Cardiac testing:   Results for orders placed during the hospital encounter of 19   Echo complete with contrast if indicated    Narrative [address]  [address] [facility]  Carlie Espinoza [phone]    Transthoracic Echocardiogram  2D, M-mode, Doppler, and Color Doppler    Study date:  23-Aug-2019    Patient: MADY [facility]  MR number: [id number]  Account number: [de-identified]  : 1940  Age: 78 years  Gender: Female  Status: Outpatient  Location: Bedside  Height: 67 in  Weight: 250 6 lb  BP: 152/ 72 mmHg    Indications: A Fib  Diagnoses: I48 0 - Atrial fibrillation    Sonographer:  Brinda Navarro [facility]  Primary Physician:  Nai Olguin MD  Referring Physician:  Kalpana Kirk MD  Group:  [facility] [id number]  Interpreting Physician:  Kalpana Kirk MD    SUMMARY    LEFT VENTRICLE:  Systolic function was normal  Ejection fraction was estimated in the range of 55 % to 60 % to be 60 %  There were no regional wall motion abnormalities  Wall thickness was mildly increased  There was mild concentric hypertrophy  RIGHT ATRIUM:  The atrium was mildly dilated  MITRAL VALVE:  There was trace regurgitation  TRICUSPID VALVE:  There was mild regurgitation  Estimated peak PA pressure was 30 mmHg  HISTORY: PRIOR HISTORY: HTN,Hyperlipidemia,A Fib ,Cancer  PROCEDURE: The procedure was performed at the bedside  This was a routine study  The transthoracic approach was used  The study included complete 2D imaging, M-mode, complete spectral Doppler, and color Doppler  The heart rate was 69 bpm,  at the start of the study  Images were obtained from the parasternal, apical, subcostal, and suprasternal notch acoustic windows  Image quality was adequate  LEFT VENTRICLE: Size was normal  Systolic function was normal  Ejection fraction was estimated in the range of 55 % to 60 % to be 60 %  There were no regional wall motion abnormalities  Wall thickness was mildly increased  There was mild  concentric hypertrophy   DOPPLER: Left ventricular diastolic function parameters were normal for the patient's age  RIGHT VENTRICLE: The size was normal  Systolic function was normal     LEFT ATRIUM: Size was normal     RIGHT ATRIUM: The atrium was mildly dilated  MITRAL VALVE: There was normal leaflet separation  DOPPLER: The transmitral velocity was within the normal range  There was no evidence for stenosis  There was trace regurgitation  AORTIC VALVE: The valve was trileaflet  Leaflets exhibited mildly increased thickness and normal cuspal separation  DOPPLER: Transaortic velocity was within the normal range  There was no evidence for stenosis  There was no regurgitation  TRICUSPID VALVE: The valve structure was normal  There was normal leaflet separation  DOPPLER: The transtricuspid velocity was within the normal range  There was no evidence for stenosis  There was mild regurgitation  Estimated peak PA  pressure was 30 mmHg  PULMONIC VALVE: DOPPLER: There was no significant regurgitation  PERICARDIUM: There was no thickening or calcification  There was no pericardial effusion  AORTA: The root exhibited normal size  SYSTEMIC VEINS: IVC: The inferior vena cava was normal in size  Respirophasic changes were normal     SYSTEM MEASUREMENT TABLES    2D mode  AoR Diam 2D: 3 3 cm  LA Diam (2D): 3 9 cm  LA/Ao (2D): 1 18  FS (2D Teich): 28 2 %  IVSd (2D): 1 21 cm  LVDEV: 78 1 cmï¾³  LVESV: 35 3 cmï¾³  LVIDd(2D): 4 19 cm  LVISd (2D): 3 01 cm  LVOT Area 2D: 3 14 cmï¾²  LVPWd (2D): 1 2 cm  SV (Teich): 42 8 cmï¾³    Apical four chamber  LVEF A4C: 57 %    Unspecified Scan Mode  REYNALDO Cont Eq (Peak Chemo): 2 58 cmï¾²  LVOT Diam : 2 cm  LVOT Vmax: 1290 mm/s  LVOT Vmax; Mean: 1290 mm/s  Peak Grad ; Mean: 7 mm[Hg]  MV Peak A Chemo: 622 mm/s  MV Peak E Chemo   Mean: 795 mm/s  MVA (PHT): 4 4 cmï¾²  PHT: 50 ms  Max P mm[Hg]  V Max: 2530 mm/s  Vmax: 2250 mm/s  RA Area: 19 5 cmï¾²  RA Volume: 59 7 cmï¾³  TAPSE: 2 1 cm    [employer] Accredited [facility]chocardi[facility]    Prepared and electronically signed by    Brayden Molina MD  Signed 23-Aug-2019 16:20:25         GUS Crowder        "This note has been constructed using a voice recognition system  Therefore there may be syntax, spelling, and/or grammatical errors   Please call if you have any questions  "

## 2019-08-24 NOTE — NURSING NOTE
AVS reviewed with pt while  present  IV removed  Rocephin given prior to discharge  Tele removed and replaced   Pt left via wheelchair to go home with spouse

## 2019-08-26 ENCOUNTER — TELEPHONE (OUTPATIENT)
Dept: FAMILY MEDICINE CLINIC | Facility: CLINIC | Age: 79
End: 2019-08-26

## 2019-08-26 DIAGNOSIS — N39.0 RECURRENT UTI: Primary | ICD-10-CM

## 2019-08-26 LAB
ATRIAL RATE: 147 BPM
ATRIAL RATE: 297 BPM
ATRIAL RATE: 71 BPM
P AXIS: 15 DEGREES
P AXIS: 248 DEGREES
PR INTERVAL: 178 MS
QRS AXIS: 24 DEGREES
QRS AXIS: 28 DEGREES
QRS AXIS: 40 DEGREES
QRSD INTERVAL: 76 MS
QT INTERVAL: 308 MS
QT INTERVAL: 348 MS
QT INTERVAL: 388 MS
QTC INTERVAL: 421 MS
QTC INTERVAL: 421 MS
QTC INTERVAL: 468 MS
T WAVE AXIS: 100 DEGREES
T WAVE AXIS: 66 DEGREES
T WAVE AXIS: 79 DEGREES
VENTRICULAR RATE: 139 BPM
VENTRICULAR RATE: 71 BPM
VENTRICULAR RATE: 88 BPM

## 2019-08-26 PROCEDURE — 93010 ELECTROCARDIOGRAM REPORT: CPT | Performed by: INTERNAL MEDICINE

## 2019-08-26 NOTE — TELEPHONE ENCOUNTER
Ata Davey saw Hyun Meyer for UTI  Prescribed Augmentin     No rash, just itchy skin  Can she take benadryl or stop taking medication      Thank you

## 2019-08-26 NOTE — TELEPHONE ENCOUNTER
I spoke w/ pt  She was discharged home from the hospital for a fib 2 days ago  She took 2 doses of Augmentin yesterday and 1 this morning  She also took her first dose of Metoprolol last night around 11pm   Around 3am, she woke up extremely itchy  No visible rashes  This resolved spontaneously after several hours  She has no recurrent itching and no rashes  She has not had any problems after taking her AM dose of Augmentin  She is hesitant to stop the antibiotic because she wants to make sure UTI has resolved  She will continue Augmentin and take her next scheduled dose of metoprolol this evening  Advised to keep benadryl on hand, and she will call with an update tomorrow  I ordered a repeat urine culture to be done next week to ensure resolution of UTI     Lo Beck

## 2019-08-28 LAB
BACTERIA BLD CULT: NORMAL
BACTERIA BLD CULT: NORMAL

## 2019-08-29 ENCOUNTER — TELEPHONE (OUTPATIENT)
Dept: CARDIOLOGY CLINIC | Facility: CLINIC | Age: 79
End: 2019-08-29

## 2019-08-29 NOTE — TELEPHONE ENCOUNTER
This is not most likely related to the medications  It is most likely related to possibility of underlying C diff colitis she was on antibiotics  She had UTI  She should be checked for C diff colitis  We can change her metoprolol XL to atenolol 25 mg daily and Xarelto to Eliquis 5 mg twice a day  I think she should call her primary care doctor

## 2019-08-30 ENCOUNTER — OFFICE VISIT (OUTPATIENT)
Dept: FAMILY MEDICINE CLINIC | Facility: CLINIC | Age: 79
End: 2019-08-30
Payer: COMMERCIAL

## 2019-08-30 VITALS
HEART RATE: 78 BPM | RESPIRATION RATE: 18 BRPM | BODY MASS INDEX: 38.06 KG/M2 | HEIGHT: 69 IN | WEIGHT: 257 LBS | OXYGEN SATURATION: 93 % | DIASTOLIC BLOOD PRESSURE: 60 MMHG | TEMPERATURE: 98.1 F | SYSTOLIC BLOOD PRESSURE: 114 MMHG

## 2019-08-30 DIAGNOSIS — L29.9 PRURITUS: ICD-10-CM

## 2019-08-30 DIAGNOSIS — Z87.440 HISTORY OF UTI: Primary | ICD-10-CM

## 2019-08-30 DIAGNOSIS — R19.7 DIARRHEA, UNSPECIFIED TYPE: ICD-10-CM

## 2019-08-30 LAB
SL AMB  POCT GLUCOSE, UA: NORMAL
SL AMB LEUKOCYTE ESTERASE,UA: ABNORMAL
SL AMB POCT BILIRUBIN,UA: ABNORMAL
SL AMB POCT BLOOD,UA: ABNORMAL
SL AMB POCT CLARITY,UA: SLIGHT
SL AMB POCT COLOR,UA: YELLOW
SL AMB POCT KETONES,UA: ABNORMAL
SL AMB POCT NITRITE,UA: ABNORMAL
SL AMB POCT PH,UA: 5
SL AMB POCT SPECIFIC GRAVITY,UA: 1.02
SL AMB POCT URINE PROTEIN: ABNORMAL
SL AMB POCT UROBILINOGEN: ABNORMAL

## 2019-08-30 PROCEDURE — 81003 URINALYSIS AUTO W/O SCOPE: CPT | Performed by: NURSE PRACTITIONER

## 2019-08-30 PROCEDURE — 87086 URINE CULTURE/COLONY COUNT: CPT | Performed by: NURSE PRACTITIONER

## 2019-08-30 PROCEDURE — 99213 OFFICE O/P EST LOW 20 MIN: CPT | Performed by: NURSE PRACTITIONER

## 2019-08-30 RX ORDER — AMLODIPINE BESYLATE 5 MG/1
TABLET ORAL DAILY
COMMUNITY
Start: 2019-08-20 | End: 2019-10-16

## 2019-08-30 NOTE — PROGRESS NOTES
Assessment/Plan:    Will send urine for repeat culture  Symptoms may be secondary to drug reaction  This may be attributed to either the Augmentin or the Metoprolol  She has been off Augmentin for the past several days  She has only had 1 episode of diarrhea and the itching is improved  She will continue to hold the Metoprolol and will resume in 2 days if her symptoms have completely resolved  If her symptoms recur, we can attribute these symptoms to the Metoprolol  We will update her allergy list as appropriate  Doubt C diff if she has only had 1 episode of diarrhea, however an order was provided for PCR if she should develop recurrent diarrhea  Problem List Items Addressed This Visit     None      Visit Diagnoses     History of UTI    -  Primary    Relevant Orders    POCT urine dip auto non-scope    Urine culture    Pruritus        Diarrhea, unspecified type        Relevant Orders    Clostridium difficile toxin by PCR              There are no Patient Instructions on file for this visit  Return for Next scheduled follow up  Subjective:      Patient ID: Michoacano Meehan is a 78 y o  female  Chief Complaint   Patient presents with    GI symptoms     history of GI issues  diarrhea noted yesterday was put on a new medication after ER visit  luis       Here today for follow up  She had a recent UTI which caused her to go into atrial fibrillation  Her antibiotic was changed and she was started on Eliquis and Metoprolol  She developed itching, which initially only occurred after her Metoprolol dose in the evening  This has continued and has more recently been happening during the day, associated with redness of the palms of her hands  She had 1 episode of explosive diarrhea yesterday, not associated with any abdominal pain or n/v  She has not had any recurrent diarrhea  She feels these side effects are secondary to her Metoprolol            The following portions of the patient's history were reviewed and updated as appropriate: allergies, current medications, past family history, past medical history, past social history, past surgical history and problem list     Review of Systems   Constitutional: Negative for chills, fatigue and fever  Respiratory: Negative for cough, shortness of breath and wheezing  Cardiovascular: Negative for chest pain, palpitations and leg swelling  Gastrointestinal: Positive for diarrhea  Negative for abdominal pain, nausea and vomiting  Skin: Negative for rash  Itching   Neurological: Negative for dizziness and headaches  Current Outpatient Medications   Medication Sig Dispense Refill    aspirin (ASPIR-81) 81 mg EC tablet Take 1 tablet by mouth daily      atorvastatin (LIPITOR) 20 mg tablet Take by mouth daily   2    irbesartan (AVAPRO) 300 mg tablet Take 300 mg by mouth daily        metoprolol succinate (TOPROL-XL) 25 mg 24 hr tablet Take 1 tablet (25 mg total) by mouth daily 30 tablet 0    rivaroxaban (XARELTO) 20 mg tablet Take 1 tablet (20 mg total) by mouth daily with dinner 30 tablet 0    amLODIPine (NORVASC) 5 mg tablet daily       No current facility-administered medications for this visit  Objective:    /60   Pulse 78   Temp 98 1 °F (36 7 °C)   Resp 18   Ht 5' 9" (1 753 m)   Wt 117 kg (257 lb)   SpO2 93%   BMI 37 95 kg/m²        Physical Exam   Constitutional: She appears well-developed and well-nourished  HENT:   Head: Normocephalic and atraumatic  Right Ear: Tympanic membrane, external ear and ear canal normal    Left Ear: Tympanic membrane, external ear and ear canal normal    Nose: No mucosal edema or rhinorrhea  Mouth/Throat: Uvula is midline, oropharynx is clear and moist and mucous membranes are normal    Eyes: Conjunctivae are normal    Neck: Neck supple  No edema present  No thyromegaly present  Cardiovascular: Normal rate, regular rhythm, normal heart sounds and intact distal pulses     No murmur heard   Pulmonary/Chest: Effort normal and breath sounds normal    Abdominal: Bowel sounds are normal  She exhibits no distension  There is no splenomegaly or hepatomegaly  There is no tenderness  Lymphadenopathy:        Right cervical: No superficial cervical adenopathy present  Left cervical: No superficial cervical adenopathy present  Skin: Skin is warm, dry and intact  No rash noted  Psychiatric: She has a normal mood and affect  Nursing note and vitals reviewed               Tomas Melara

## 2019-08-31 LAB — BACTERIA UR CULT: NORMAL

## 2019-09-03 ENCOUNTER — TELEPHONE (OUTPATIENT)
Dept: FAMILY MEDICINE CLINIC | Facility: CLINIC | Age: 79
End: 2019-09-03

## 2019-09-20 NOTE — PROGRESS NOTES
Progress Note - Cardiology Office  [facility]    Rosales Alfredo 78 y o  female MRN: [id number]  : 1940  Encounter: [id number]      Assessment:     1  Benign hypertension    2  Paroxysmal atrial fibrillation (HCC)    3  Morbid obesity ([address] )    4  RAJWINDER (obstructive sleep apnea)    5  Mixed hyperlipidemia    6  Meningioma [facility])        Discussion Summary and Plan:  1  Recurrent atrial fibrillation/atrial flutter with rapid ventricular rate   Patient has history of paroxysmal atrial fibrillation  Recently she was admitted with AFib with rapid ventricular rate  She was started on Cardizem drip she spontaneously converted back to sinus rhythm  TSH was acceptable  Continue metoprolol XL  This episode happened while she had UTI  Spoke to patient patient her Jennifer Vasc score is 4  After extensive discussion she is willing to continue Xarelto  We will discontinue aspirin  Advised her to call us back if she has any problem to Xarelto no antidote, risk of bleeding, benefits discussed with patient at length  Since he has atrial fibrillation she will be scheduled for nuclear stress test to rule out ischemia  2  Essential hypertension  patient has longstanding history of essential hypertension  Well controlled with her current therapy  Continue Avapro 300 mg daily amlodipine 5 mg daily and metoprolol XL  Heart rate is acceptable blood pressure is acceptable today        3  Dyslipidemia  Continue statins  cholesterol profile is acceptable     4  Obesity with BMI around 39  Discussed with patient at length  She is motivated to lose weight  5  Abnormal sleep screen  She probably have sleep apnea  She will be scheduled for sleep study      6  Recurrent UTI  On Augmentin  now better    7  History of meningioma    Please call [phone] if any questions  Counseling :  A description of the counseling  Goals and Barriers    Patient's ability to self care: Yes  Medication side effect reviewed with patient in detail and all their questions answered to their satisfaction  HPI :     Luis A  is a 78y o  year old female who came for follow up  Patient was recently admitted to [facility] with palpitations and rapid heartbeat and was found to be in AFib with rapid ventricular rate  She has a past medical history significant for essential hypertension, obesity with BMI around 39, dyslipidemia, previous history of atrial fibrillation but not on antithrombotic therapy as she was getting brief episodes, who noted she went into AFib earlier that morning and was sent to the emergency room  Patient spontaneously converted back to sinus rhythm and was started on antithrombotic therapy and added low-dose metoprolol  Currently she came for follow-up  She did have some rash she is not sure it related to Toprol XL or Xarelto but it has slowly improved     At this time she denies any chest pain any shortness of breath  She has some chronic shortness of breath which is not changed  No nausea no vomiting no PND no orthopnea no palpitations at all  She does monitor heart rate regularly at generally is around 70 beats per minute  Review of Systems   Constitutional: Positive for fever  Negative for activity change, chills, diaphoresis and unexpected weight change  HENT: Negative for congestion  Eyes: Negative for discharge and redness  Respiratory: Negative for cough, chest tightness, shortness of breath and wheezing  Cardiovascular: Negative  Negative for chest pain, palpitations and leg swelling  Gastrointestinal: Negative for abdominal pain, diarrhea and nausea  Endocrine: Negative  Genitourinary: Negative for decreased urine volume and urgency  Musculoskeletal: Negative  Negative for arthralgias, back pain and gait problem  Skin: Negative for rash and wound  Allergic/Immunologic: Negative      Neurological: Negative for dizziness, seizures, syncope, weakness, light-headedness and headaches  Hematological: Negative  Psychiatric/Behavioral: Negative for agitation and confusion  The patient is nervous/anxious  Historical Information   Past Medical History:   Diagnosis Date    A-fib ([facility] )     Cancer ([facility] )     Hyperlipidemia     Hypertension     UTI (urinary tract infection)      Past Surgical History:   Procedure Laterality Date    HYSTERECTOMY      JOINT REPLACEMENT       Social History     Substance and Sexual Activity   Alcohol Use Not Currently    Frequency: Never    Comment: rarely     Social History     Substance and Sexual Activity   Drug Use No     Social History     Tobacco Use   Smoking Status Never Smoker   Smokeless Tobacco Never Used     Family History: History reviewed  No pertinent family history  Meds/Allergies     Allergies   Allergen Reactions    Macrodantin [Nitrofurantoin Macrocrystal] Vomiting    Nitrofurantoin     Oxycodone-Acetaminophen Vomiting    Sulfa Antibiotics        Current Outpatient Medications:     amLODIPine (NORVASC) 5 mg tablet, daily, Disp: , Rfl:     atorvastatin (LIPITOR) 40 mg tablet, Take 40 mg by mouth daily, Disp: , Rfl:     Cholecalciferol (VITAMIN D-3) 1000 units CAPS, Take by mouth, Disp: , Rfl:     irbesartan (AVAPRO) 300 mg tablet, Take 300 mg by mouth daily  , Disp: , Rfl:     metoprolol succinate (TOPROL-XL) 25 mg 24 hr tablet, Take 1 tablet (25 mg total) by mouth daily, Disp: 30 tablet, Rfl: 0    Multiple Vitamins-Minerals (CENTRUM ADULTS PO), Take by mouth, Disp: , Rfl:     rivaroxaban (XARELTO) 20 mg tablet, Take 1 tablet (20 mg total) by mouth daily with dinner, Disp: 30 tablet, Rfl: 6    Vitals: Blood pressure 138/74, pulse 82, height 5' 7 5" (1 715 m), weight 116 kg (256 lb), SpO2 97 %  Body mass index is 39 5 kg/m²    Vitals:    09/25/19 1158   Weight: 116 kg (256 lb)     BP Readings from Last 3 Encounters:   09/25/19 138/74   08/30/19 114/60   08/24/19 119/57 Physical Exam    Neurologic:  Alert & oriented x 3, no new focal deficits, Not in any acute distress,  Constitutional:  Well developed, well nourished, non-toxic appearance   Eyes:  Pupil equal and reacting to light, conjunctiva normal,   HENT:  Atraumatic, oropharynx moist, Neck- normal range of motion, no tenderness,  Neck supple, No JVP, No LNP   Respiratory:  Bilateral air entry, mostly clear to auscultation  Cardiovascular: S1-S2 regular with a 2/6 ejection systolic murmur and S4 is present  GI:  Soft, nondistended, normal bowel sounds, nontender, no hepatosplenomegaly appreciated  Musculoskeletal:  No edema, no tenderness, no deformities  Skin:  Well hydrated, no rash   Lymphatic:  No lymphadenopathy noted   Extremities:  No edema and distal pulses are present    Diagnostic Studies Review Cardio:  Echo Doppler  Echo Doppler done 2019 shows EF 60%, left atrium size was normal, right atrium mildly dilated, no significant valvular disease  EKG:  Twelve lead EKG done 2019 shows normal sinus rhythm heart rate 70 beats per minute  Cardiac testing:   Results for orders placed during the hospital encounter of 19   Echo complete with contrast if indicated    Narrative [address]  [address] [facility], [facility]  [phone]    Transthoracic Echocardiogram  2D, M-mode, Doppler, and Color Doppler    Study date:  23-Aug-2019    Patient: [facility]  MR number: [id number]  Account number: [de-identified]  : 1940  Age: 78 years  Gender: Female  Status: Outpatient  Location: Bedside  Height: 67 in  Weight: 250 6 lb  BP: 152/ 72 mmHg    Indications: A Fib      Diagnoses: I48 0 - Atrial fibrillation    Sonographer:  CHENG Ware  Primary Physician:  Gilmar Nunn MD  Referring Physician:  Weston Garcia MD  Group:  [facility]  Interpreting Physician:  Weston Garcia MD    SUMMARY    LEFT VENTRICLE:  Systolic function was normal  Ejection fraction was estimated in the range of 55 % to 60 % to be 60 %  There were no regional wall motion abnormalities  Wall thickness was mildly increased  There was mild concentric hypertrophy  RIGHT ATRIUM:  The atrium was mildly dilated  MITRAL VALVE:  There was trace regurgitation  TRICUSPID VALVE:  There was mild regurgitation  Estimated peak PA pressure was 30 mmHg  HISTORY: PRIOR HISTORY: HTN,Hyperlipidemia,A Fib ,Cancer  PROCEDURE: The procedure was performed at the bedside  This was a routine study  The transthoracic approach was used  The study included complete 2D imaging, M-mode, complete spectral Doppler, and color Doppler  The heart rate was 69 bpm,  at the start of the study  Images were obtained from the parasternal, apical, subcostal, and suprasternal notch acoustic windows  Image quality was adequate  LEFT VENTRICLE: Size was normal  Systolic function was normal  Ejection fraction was estimated in the range of 55 % to 60 % to be 60 %  There were no regional wall motion abnormalities  Wall thickness was mildly increased  There was mild  concentric hypertrophy  DOPPLER: Left ventricular diastolic function parameters were normal for the patient's age  RIGHT VENTRICLE: The size was normal  Systolic function was normal     LEFT ATRIUM: Size was normal     RIGHT ATRIUM: The atrium was mildly dilated  MITRAL VALVE: There was normal leaflet separation  DOPPLER: The transmitral velocity was within the normal range  There was no evidence for stenosis  There was trace regurgitation  AORTIC VALVE: The valve was trileaflet  Leaflets exhibited mildly increased thickness and normal cuspal separation  DOPPLER: Transaortic velocity was within the normal range  There was no evidence for stenosis  There was no regurgitation  TRICUSPID VALVE: The valve structure was normal  There was normal leaflet separation   DOPPLER: The transtricuspid velocity was within the normal range  There was no evidence for stenosis  There was mild regurgitation  Estimated peak PA  pressure was 30 mmHg  PULMONIC VALVE: DOPPLER: There was no significant regurgitation  PERICARDIUM: There was no thickening or calcification  There was no pericardial effusion  AORTA: The root exhibited normal size  SYSTEMIC VEINS: IVC: The inferior vena cava was normal in size  Respirophasic changes were normal     SYSTEM MEASUREMENT TABLES    2D mode  AoR Diam 2D: 3 3 cm  LA Diam (2D): 3 9 cm  LA/Ao (2D): 1 18  FS (2D Teich): 28 2 %  IVSd (2D): 1 21 cm  LVDEV: 78 1 cmï¾³  LVESV: 35 3 cmï¾³  LVIDd(2D): 4 19 cm  LVISd (2D): 3 01 cm  LVOT Area 2D: 3 14 cmï¾²  LVPWd (2D): 1 2 cm  SV (Teich): 42 8 cmï¾³    Apical four chamber  LVEF A4C: 57 %    Unspecified Scan Mode  REYNALDO Cont Eq (Peak Chemo): 2 58 cmï¾²  LVOT Diam : 2 cm  LVOT Vmax: 1290 mm/s  LVOT Vmax; Mean: 1290 mm/s  Peak Grad ; Mean: 7 mm[Hg]  MV Peak A Chemo: 622 mm/s  MV Peak E Chemo  Mean: 795 mm/s  MVA (PHT): 4 4 cmï¾²  PHT: 50 ms  Max P mm[Hg]  V Max: 2530 mm/s  Vmax: 2250 mm/s  RA Area: 19 5 cmï¾²  RA Volume: 59 7 cmï¾³  TAPSE: 2 1 cm    [employer] Accredited Echocardiography Laboratory    Prepared and electronically signed by    Maryjane Serrano MD  Signed 23-Aug-2019 16:20:25       Imaging:  Chest X-Ray:   No Chest XR results available for this patient  CT-scan of the chest:     No CTA results available for this patient    Lab Review   Lab Results   Component Value Date    WBC 12 73 (H) 2019    HGB 12 6 2019    HCT 41 6 2019    MCV 95 2019    RDW 13 7 2019     2019     BMP:  Lab Results   Component Value Date    SODIUM 137 2019    K 4 1 2019     2019    CO2 24 2019    BUN 18 2019    CREATININE 0 85 2019    GLUC 108 2019    GLUF 99 2019    CALCIUM 8 5 2019    EGFR 65 2019    MG 2 2 2016     LFT:  Lab Results Component Value Date    AST 20 05/10/2019    ALT 24 05/10/2019    ALKPHOS 81 05/10/2019    TP 7 1 05/10/2019    ALB 3 3 (L) 05/10/2019      Lab Results   Component Value Date    MQG8TFYRLMRA 3 134 08/23/2019     No results found for: HGBA1C  Lipid Profile:   Lab Results   Component Value Date    CHOLESTEROL 193 05/10/2019     (H) 05/10/2019    LDLCALC 67 05/10/2019    TRIG 50 05/10/2019     Lab Results   Component Value Date    CHOLESTEROL 193 05/10/2019    CHOLESTEROL 191 01/04/2019     Lab Results   Component Value Date    CKTOTAL 69 04/17/2018    TROPONINI <0 02 08/23/2019     Lab Results   Component Value Date    NTBNP 143 04/17/2018            Dr Varsha Toribio MD [facility] [place]      "This note has been constructed using a voice recognition system  Therefore there may be syntax, spelling, and/or grammatical errors   Please call if you have any questions  "

## 2019-09-25 ENCOUNTER — OFFICE VISIT (OUTPATIENT)
Dept: CARDIOLOGY CLINIC | Facility: CLINIC | Age: 79
End: 2019-09-25
Payer: COMMERCIAL

## 2019-09-25 VITALS
BODY MASS INDEX: 38.8 KG/M2 | WEIGHT: 256 LBS | SYSTOLIC BLOOD PRESSURE: 138 MMHG | DIASTOLIC BLOOD PRESSURE: 74 MMHG | HEIGHT: 68 IN | OXYGEN SATURATION: 97 % | HEART RATE: 82 BPM

## 2019-09-25 DIAGNOSIS — G47.33 OSA (OBSTRUCTIVE SLEEP APNEA): ICD-10-CM

## 2019-09-25 DIAGNOSIS — I48.0 PAROXYSMAL ATRIAL FIBRILLATION (HCC): ICD-10-CM

## 2019-09-25 DIAGNOSIS — D32.9 MENINGIOMA (HCC): ICD-10-CM

## 2019-09-25 DIAGNOSIS — E66.01 MORBID OBESITY (HCC): ICD-10-CM

## 2019-09-25 DIAGNOSIS — I10 BENIGN HYPERTENSION: ICD-10-CM

## 2019-09-25 DIAGNOSIS — E78.2 MIXED HYPERLIPIDEMIA: ICD-10-CM

## 2019-09-25 PROCEDURE — 99214 OFFICE O/P EST MOD 30 MIN: CPT | Performed by: INTERNAL MEDICINE

## 2019-09-25 RX ORDER — ATORVASTATIN CALCIUM 40 MG/1
40 TABLET, FILM COATED ORAL DAILY
COMMUNITY
End: 2021-05-24 | Stop reason: SDUPTHER

## 2019-09-25 RX ORDER — CHOLECALCIFEROL (VITAMIN D3) 25 MCG
CAPSULE ORAL
COMMUNITY

## 2019-09-30 ENCOUNTER — TELEPHONE (OUTPATIENT)
Dept: CARDIOLOGY CLINIC | Facility: CLINIC | Age: 79
End: 2019-09-30

## 2019-09-30 DIAGNOSIS — I48.0 PAROXYSMAL ATRIAL FIBRILLATION (HCC): ICD-10-CM

## 2019-09-30 RX ORDER — METOPROLOL SUCCINATE 25 MG/1
25 TABLET, EXTENDED RELEASE ORAL DAILY
Qty: 90 TABLET | Refills: 3 | Status: SHIPPED | OUTPATIENT
Start: 2019-09-30 | End: 2020-07-20

## 2019-09-30 NOTE — TELEPHONE ENCOUNTER
She was here last week and Dr Nanci Vaca was ordering 2 scripts for her  She did not get the metoprolol    metoprolol 25mg  optimrx

## 2019-10-02 ENCOUNTER — TELEPHONE (OUTPATIENT)
Dept: CARDIOLOGY CLINIC | Facility: CLINIC | Age: 79
End: 2019-10-02

## 2019-10-02 NOTE — TELEPHONE ENCOUNTER
TRIED TO OBTAIN AUTHORIZATION FOR SPLIT STUDY THAT YOU ORDERED THEY WILL NOT AUTH THIS UNLESS YOU DO A PEER TO PEER WHICH RUNS OUT TOMORROW 10- IF YOU WISH TO CANCEL THIS AND ORDER A HOME STUDY THIS NEEDS NO AUTHORIZATION

## 2019-10-03 DIAGNOSIS — G47.33 OSA (OBSTRUCTIVE SLEEP APNEA): Primary | ICD-10-CM

## 2019-10-09 ENCOUNTER — HOSPITAL ENCOUNTER (OUTPATIENT)
Dept: RADIOLOGY | Facility: HOSPITAL | Age: 79
Discharge: HOME | End: 2019-10-09
Attending: INTERNAL MEDICINE
Payer: COMMERCIAL

## 2019-10-09 ENCOUNTER — HOSPITAL ENCOUNTER (OUTPATIENT)
Dept: NON INVASIVE DIAGNOSTICS | Facility: HOSPITAL | Age: 79
Discharge: HOME | End: 2019-10-09
Attending: INTERNAL MEDICINE
Payer: COMMERCIAL

## 2019-10-09 DIAGNOSIS — E66.01 MORBID OBESITY (HCC): ICD-10-CM

## 2019-10-09 DIAGNOSIS — I48.0 PAROXYSMAL ATRIAL FIBRILLATION (HCC): ICD-10-CM

## 2019-10-09 DIAGNOSIS — I10 BENIGN HYPERTENSION: ICD-10-CM

## 2019-10-09 LAB
CHEST PAIN STATEMENT: NORMAL
MAX DIASTOLIC BP: 72 MMHG
MAX HEART RATE: 123 BPM
MAX PREDICTED HEART RATE: 141 BPM
MAX. SYSTOLIC BP: 166 MMHG
PROTOCOL NAME: NORMAL
TARGET HR FORMULA: NORMAL
TIME IN EXERCISE PHASE: NORMAL

## 2019-10-09 PROCEDURE — A9502 TC99M TETROFOSMIN: HCPCS

## 2019-10-09 PROCEDURE — 78452 HT MUSCLE IMAGE SPECT MULT: CPT

## 2019-10-09 PROCEDURE — 93017 CV STRESS TEST TRACING ONLY: CPT

## 2019-10-10 ENCOUNTER — TELEPHONE (OUTPATIENT)
Dept: CARDIOLOGY CLINIC | Facility: CLINIC | Age: 79
End: 2019-10-10

## 2019-10-10 PROCEDURE — 78452 HT MUSCLE IMAGE SPECT MULT: CPT | Performed by: INTERNAL MEDICINE

## 2019-10-10 PROCEDURE — 93018 CV STRESS TEST I&R ONLY: CPT | Performed by: INTERNAL MEDICINE

## 2019-10-10 PROCEDURE — 93016 CV STRESS TEST SUPVJ ONLY: CPT | Performed by: INTERNAL MEDICINE

## 2019-10-10 NOTE — TELEPHONE ENCOUNTER
----- Message from Jana Valdez MD sent at 10/10/2019 11:33 AM EDT -----  Pt's Patient's stress test is normal    Patient can keep regular appointment  Please call patient with the result

## 2019-10-16 ENCOUNTER — OFFICE VISIT (OUTPATIENT)
Dept: CARDIOLOGY CLINIC | Facility: CLINIC | Age: 79
End: 2019-10-16
Payer: COMMERCIAL

## 2019-10-16 VITALS
SYSTOLIC BLOOD PRESSURE: 126 MMHG | BODY MASS INDEX: 38.8 KG/M2 | OXYGEN SATURATION: 97 % | HEART RATE: 81 BPM | HEIGHT: 68 IN | DIASTOLIC BLOOD PRESSURE: 84 MMHG | WEIGHT: 256 LBS

## 2019-10-16 DIAGNOSIS — G47.33 OSA (OBSTRUCTIVE SLEEP APNEA): ICD-10-CM

## 2019-10-16 DIAGNOSIS — I10 BENIGN HYPERTENSION: ICD-10-CM

## 2019-10-16 DIAGNOSIS — I48.0 PAROXYSMAL ATRIAL FIBRILLATION (HCC): Primary | ICD-10-CM

## 2019-10-16 DIAGNOSIS — R60.0 BILATERAL EDEMA OF LOWER EXTREMITY: ICD-10-CM

## 2019-10-16 DIAGNOSIS — E78.2 MIXED HYPERLIPIDEMIA: ICD-10-CM

## 2019-10-16 DIAGNOSIS — D32.9 MENINGIOMA (HCC): ICD-10-CM

## 2019-10-16 PROBLEM — R06.81 APNEIC EPISODE: Status: RESOLVED | Noted: 2018-04-16 | Resolved: 2019-10-16

## 2019-10-16 PROCEDURE — 3079F DIAST BP 80-89 MM HG: CPT | Performed by: INTERNAL MEDICINE

## 2019-10-16 PROCEDURE — 99214 OFFICE O/P EST MOD 30 MIN: CPT | Performed by: INTERNAL MEDICINE

## 2019-10-16 PROCEDURE — 3074F SYST BP LT 130 MM HG: CPT | Performed by: INTERNAL MEDICINE

## 2019-10-16 PROCEDURE — 93000 ELECTROCARDIOGRAM COMPLETE: CPT | Performed by: INTERNAL MEDICINE

## 2019-10-16 RX ORDER — TRIAMTERENE AND HYDROCHLOROTHIAZIDE 37.5; 25 MG/1; MG/1
0.5 TABLET ORAL DAILY
Qty: 45 TABLET | Refills: 1 | Status: SHIPPED | OUTPATIENT
Start: 2019-10-16 | End: 2020-04-22

## 2019-10-16 RX ORDER — TRIAMTERENE AND HYDROCHLOROTHIAZIDE 37.5; 25 MG/1; MG/1
0.5 TABLET ORAL DAILY
Qty: 45 TABLET | Refills: 1 | Status: SHIPPED | OUTPATIENT
Start: 2019-10-16 | End: 2019-10-16 | Stop reason: SDUPTHER

## 2019-10-16 NOTE — PROGRESS NOTES
Progress Note - Cardiology Office  [facility]    Ulisses Alfredo 78 y o  female MRN: [id number]  : 1940  Encounter: [id number]      Assessment:     1  Benign hypertension    2  Paroxysmal atrial fibrillation (HCC)    3  Mixed hyperlipidemia    4  RAJWINDER (obstructive sleep apnea)    5  Meningioma ([address] )    6  Bilateral edema of lower extremity        Discussion Summary and Plan:  1  Paroxysmal atrial fibrillation  Patient is staying in sinus rhythm  She was recently admitted with recurrent atrial fibrillation rapid ventricular rate  She had fever and UTI at that time  Her Jennifer Vasc score is high  She was started on Xarelto  Aspirin was discontinued  All issues related to Xarelto like no antidote risk of bleeding benefits all discussed with patient at length and patient's daughter  Her stress test shows no ischemia  If he has recurrent atrial fibrillation we will consider low-dose flecainide       2  Essential hypertension  Patient has longstanding history of essential hypertension  Was pretty well controlled with current therapy  However amlodipine has caused her more leg edema  Will discontinue amlodipine continue Avapro and metoprolol XL and add Maxzide 0 5 tablet daily  She has a dose of 25/37 5 mg pill  Will check BMP in 7-14 days        3  Dyslipidemia  Continue statins  cholesterol profile is acceptable continue current dose of statins      4  Obesity with BMI around 39  Discussed with patient at length  She need to lose weight she need to more active  5  Abnormal sleep screen  She is scheduled to have home sleep study     6  Bilateral lower extremity edema  Worse with amlodipine  Patient also has noted previously that her legs swell up in the evening  There may be some evidence of venous insufficiency may need venous duplex study  She is already on Xarelto  Diuretics added  Advised to use Magan stockings      7  History of meningioma    Please call [phone] if any questions  Counseling :  A description of the counseling  Goals and Barriers  Patient's ability to self care: Yes  Medication side effect reviewed with patient in detail and all their questions answered to their satisfaction  HPI :     Wandy Harris is a 78y o  year old female who came for follow up  Patient was recently admitted to [facility] with palpitations and rapid heartbeat and was found to be in AFib with rapid ventricular rate  She has a past medical history significant for essential hypertension, obesity with BMI around 39, dyslipidemia, previous history of atrial fibrillation but not on antithrombotic therapy as she was getting brief episodes, who noted she went into AFib earlier that morning and was sent to the emergency room  Patient spontaneously converted back to sinus rhythm and was started on antithrombotic therapy and added low-dose metoprolol  Currently she came for follow-up  She did have some rash she is not sure it related to Toprol XL or Xarelto but it has slowly improved     At this time she denies any chest pain any shortness of breath  She has some chronic shortness of breath which is not changed  No nausea no vomiting no PND no orthopnea no palpitations at all  She does monitor heart rate regularly at generally is around 70 beats per minute  10/16/2019  Above reviewed  Patient was recently seen by us in September  She was started on amlodipine 5 because of high blood pressure  Since then she has developed leg edema  There were more swollen the evening still have some swelling bilaterally  She was in emergency room  She has a past medical history significant for atrial fibrillation, essential hypertension, dyslipidemia, obesity with BMI around 39 and has been taking her medications very well  Denies any chest pain any nausea and vomiting    She also had frequent UTI and has been treated with antibiotics in the past   No other significant complaint at this time  Review of Systems   Constitutional: Positive for fatigue  Negative for activity change, chills, diaphoresis, fever and unexpected weight change  HENT: Negative for congestion  Eyes: Negative for discharge and redness  Respiratory: Negative for cough, chest tightness, shortness of breath and wheezing  Cardiovascular: Positive for leg swelling  Negative for chest pain and palpitations  Gastrointestinal: Negative for abdominal pain, diarrhea and nausea  Endocrine: Negative  Genitourinary: Negative for decreased urine volume and urgency  Musculoskeletal: Positive for back pain and gait problem  Negative for arthralgias  Skin: Negative for rash and wound  Allergic/Immunologic: Negative  Neurological: Negative for dizziness, seizures, syncope, weakness, light-headedness and headaches  Hematological: Negative  Psychiatric/Behavioral: Positive for sleep disturbance  Negative for agitation and confusion  The patient is nervous/anxious  Historical Information   Past Medical History:   Diagnosis Date    A-fib ([facility] )     Cancer ([facility] )     Hyperlipidemia     Hypertension     UTI (urinary tract infection)      Past Surgical History:   Procedure Laterality Date    HYSTERECTOMY      JOINT REPLACEMENT       Social History     Substance and Sexual Activity   Alcohol Use Not Currently    Frequency: Never    Comment: rarely     Social History     Substance and Sexual Activity   Drug Use No     Social History     Tobacco Use   Smoking Status Never Smoker   Smokeless Tobacco Never Used     Family History: History reviewed  No pertinent family history      Meds/Allergies     Allergies   Allergen Reactions    Macrodantin [Nitrofurantoin Macrocrystal] Vomiting    Nitrofurantoin     Oxycodone-Acetaminophen Vomiting    Sulfa Antibiotics        Current Outpatient Medications:     atorvastatin (LIPITOR) 40 mg tablet, Take 40 mg by mouth daily, Disp: , Rfl:   irbesartan (AVAPRO) 300 mg tablet, Take 300 mg by mouth daily  , Disp: , Rfl:     metoprolol succinate (TOPROL-XL) 25 mg 24 hr tablet, Take 1 tablet (25 mg total) by mouth daily, Disp: 90 tablet, Rfl: 3    Multiple Vitamins-Minerals (CENTRUM ADULTS PO), Take by mouth, Disp: , Rfl:     rivaroxaban (XARELTO) 20 mg tablet, Take 1 tablet (20 mg total) by mouth daily with dinner, Disp: 30 tablet, Rfl: 6    Cholecalciferol (VITAMIN D-3) 1000 units CAPS, Take by mouth, Disp: , Rfl:     triamterene-hydrochlorothiazide (MAXZIDE-25) 37 5-25 mg per tablet, Take 0 5 tablets by mouth daily, Disp: 45 tablet, Rfl: 1    Vitals: Blood pressure 126/84, pulse 81, height 5' 7 5" (1 715 m), weight 116 kg (256 lb), SpO2 97 %  Body mass index is 39 5 kg/m²  Vitals:    10/16/19 0957   Weight: 116 kg (256 lb)     BP Readings from Last 3 Encounters:   10/16/19 126/84   09/25/19 138/74   08/30/19 114/60         Physical Exam   Constitutional: She is oriented to person, place, and time  She appears well-developed and well-nourished  No distress  HENT:   Head: Normocephalic and atraumatic  Eyes: Pupils are equal, round, and reactive to light  Neck: Neck supple  No JVD present  No thyromegaly present  Cardiovascular: Normal rate, S1 normal and S2 normal  An irregularly irregular rhythm present  Exam reveals no gallop, no S3, no S4, no distant heart sounds and no friction rub  Murmur heard  Systolic murmur is present  Pulmonary/Chest: Effort normal and breath sounds normal  No respiratory distress  She has no wheezes  She has no rales  She exhibits no tenderness  Abdominal: Soft  She exhibits no distension  There is no tenderness  Musculoskeletal: She exhibits edema  She exhibits no deformity  At least 1+ bilateral edema more on the right lower extremity   Lymphadenopathy:     She has no cervical adenopathy  Neurological: She is alert and oriented to person, place, and time  Skin: Skin is warm and dry   No rash noted  She is not diaphoretic  No pallor  Psychiatric: She has a normal mood and affect  Judgment normal        Diagnostic Studies Review Cardio:    Echo Doppler  Echo Doppler done 2019 shows EF 60%, left atrium size was normal, right atrium mildly dilated, no significant valvular disease  Nuclear stress test   Nuclear stress test done 10/09/2019 was probably normal   Exercised for 5 minutes and 51 seconds  EF was 75%  No perfusion abnormality was noted  There was imaged artifact noted  EKG:  Twelve lead EKG done 2019 shows normal sinus rhythm heart rate 70 beats per minute  Twelve lead EKG done today 10/16/2019 shows normal sinus rhythm heart rate 74 beats per minute  Cardiac testing:   Results for orders placed during the hospital encounter of 19   Echo complete with contrast if indicated    Narrative [address]  [address] [facility] [place]   [phone]    Transthoracic Echocardiogram  2D, M-mode, Doppler, and Color Doppler    Study date:  23-Aug-2019    Patient: [facility]  MR number: [id number]  Account number: [de-identified]  : 1940  Age: 78 years  Gender: Female  Status: Outpatient  Location: Bedside  Height: 67 in  Weight: 250 6 lb  BP: 152/ 72 mmHg    Indications: A Fib  Diagnoses: I48 0 - Atrial fibrillation    Sonographer:  CHENG Hernandez  Primary Physician:  Bowen Cano MD  Referring Physician:  Becky Arellano MD  Group:  [facility]  Interpreting Physician:  Becky Arellano MD    SUMMARY    LEFT VENTRICLE:  Systolic function was normal  Ejection fraction was estimated in the range of 55 % to 60 % to be 60 %  There were no regional wall motion abnormalities  Wall thickness was mildly increased  There was mild concentric hypertrophy  RIGHT ATRIUM:  The atrium was mildly dilated  MITRAL VALVE:  There was trace regurgitation      TRICUSPID VALVE:  There was mild regurgitation  Estimated peak PA pressure was 30 mmHg  HISTORY: PRIOR HISTORY: HTN,Hyperlipidemia,A Fib ,Cancer  PROCEDURE: The procedure was performed at the bedside  This was a routine study  The transthoracic approach was used  The study included complete 2D imaging, M-mode, complete spectral Doppler, and color Doppler  The heart rate was 69 bpm,  at the start of the study  Images were obtained from the parasternal, apical, subcostal, and suprasternal notch acoustic windows  Image quality was adequate  LEFT VENTRICLE: Size was normal  Systolic function was normal  Ejection fraction was estimated in the range of 55 % to 60 % to be 60 %  There were no regional wall motion abnormalities  Wall thickness was mildly increased  There was mild  concentric hypertrophy  DOPPLER: Left ventricular diastolic function parameters were normal for the patient's age  RIGHT VENTRICLE: The size was normal  Systolic function was normal     LEFT ATRIUM: Size was normal     RIGHT ATRIUM: The atrium was mildly dilated  MITRAL VALVE: There was normal leaflet separation  DOPPLER: The transmitral velocity was within the normal range  There was no evidence for stenosis  There was trace regurgitation  AORTIC VALVE: The valve was trileaflet  Leaflets exhibited mildly increased thickness and normal cuspal separation  DOPPLER: Transaortic velocity was within the normal range  There was no evidence for stenosis  There was no regurgitation  TRICUSPID VALVE: The valve structure was normal  There was normal leaflet separation  DOPPLER: The transtricuspid velocity was within the normal range  There was no evidence for stenosis  There was mild regurgitation  Estimated peak PA  pressure was 30 mmHg  PULMONIC VALVE: DOPPLER: There was no significant regurgitation  PERICARDIUM: There was no thickening or calcification  There was no pericardial effusion  AORTA: The root exhibited normal size      SYSTEMIC VEINS: IVC: The inferior vena cava was normal in size  Respirophasic changes were normal     SYSTEM MEASUREMENT TABLES    2D mode  AoR Diam 2D: 3 3 cm  LA Diam (2D): 3 9 cm  LA/Ao (2D): 1 18  FS (2D Teich): 28 2 %  IVSd (2D): 1 21 cm  LVDEV: 78 1 cmï¾³  LVESV: 35 3 cmï¾³  LVIDd(2D): 4 19 cm  LVISd (2D): 3 01 cm  LVOT Area 2D: 3 14 cmï¾²  LVPWd (2D): 1 2 cm  SV (Teich): 42 8 cmï¾³    Apical four chamber  LVEF A4C: 57 %    Unspecified Scan Mode  REYNALDO Cont Eq (Peak Chemo): 2 58 cmï¾²  LVOT Diam : 2 cm  LVOT Vmax: 1290 mm/s  LVOT Vmax; Mean: 1290 mm/s  Peak Grad ; Mean: 7 mm[Hg]  MV Peak A Chemo: 622 mm/s  MV Peak E Chemo  Mean: 795 mm/s  MVA (PHT): 4 4 cmï¾²  PHT: 50 ms  Max P mm[Hg]  V Max: 2530 mm/s  Vmax: 2250 mm/s  RA Area: 19 5 cmï¾²  RA Volume: 59 7 cmï¾³  TAPSE: 2 1 cm    [employer] Accredited Echocardiography Laboratory    Prepared and electronically signed by    Avani Bowen MD  Signed 23-Aug-2019 16:20:25       Imaging:  Chest X-Ray:   No Chest XR results available for this patient  CT-scan of the chest:     No CTA results available for this patient    Lab Review   Lab Results   Component Value Date    WBC 12 73 (H) 2019    HGB 12 6 2019    HCT 41 6 2019    MCV 95 2019    RDW 13 7 2019     2019     BMP:  Lab Results   Component Value Date    SODIUM 137 2019    K 4 1 2019     2019    CO2 24 2019    BUN 18 2019    CREATININE 0 85 2019    GLUC 108 2019    GLUF 99 2019    CALCIUM 8 5 2019    EGFR 65 2019    MG 2 2 2016     LFT:  Lab Results   Component Value Date    AST 20 05/10/2019    ALT 24 05/10/2019    ALKPHOS 81 05/10/2019    TP 7 1 05/10/2019    ALB 3 3 (L) 05/10/2019      Lab Results   Component Value Date    RJW4VMBLZLSY 3 134 2019     No results found for: HGBA1C  Lipid Profile:   Lab Results   Component Value Date    CHOLESTEROL 193 05/10/2019     (H) 05/10/2019    [facility] 67 05/10/2019    TRIG 50 05/10/2019     Lab Results   Component Value Date    CHOLESTEROL 193 05/10/2019    CHOLESTEROL 191 01/04/2019     Lab Results   Component Value Date    CKTOTAL 69 04/17/2018    TROPONINI <0 02 08/23/2019     Lab Results   Component Value Date    NTBNP 143 04/17/2018            Dr Sreedhar Whitney MD [facility]      "This note has been constructed using a voice recognition system  Therefore there may be syntax, spelling, and/or grammatical errors   Please call if you have any questions  "

## 2019-10-22 ENCOUNTER — HOSPITAL ENCOUNTER (OUTPATIENT)
Dept: SLEEP CENTER | Facility: CLINIC | Age: 79
Discharge: HOME | End: 2019-10-22
Payer: COMMERCIAL

## 2019-10-22 DIAGNOSIS — G47.33 OSA (OBSTRUCTIVE SLEEP APNEA): ICD-10-CM

## 2019-10-22 PROCEDURE — G0399 HOME SLEEP TEST/TYPE 3 PORTA: HCPCS

## 2019-10-22 PROCEDURE — G0399 HOME SLEEP TEST/TYPE 3 PORTA: HCPCS | Performed by: INTERNAL MEDICINE

## 2019-10-23 ENCOUNTER — TELEPHONE (OUTPATIENT)
Dept: SLEEP CENTER | Facility: CLINIC | Age: 79
End: 2019-10-23

## 2019-10-23 NOTE — PROGRESS NOTES
Home Sleep Study Documentation    Pre-Sleep Home Study:    Set-up and instructions performed by: MAUREEN Talamantes RPSGT    Technician performed demonstration for Patient: yes    Return demonstration performed by Patient: yes    Written instructions provided to Patient: yes    Patient signed consent form: yes        Post-Sleep Home Study:    Additional comments by Patient: discomfort with device  Knee pain   Took Tramadol at 3 AM     Home Sleep Study Failed:no:    Failure reason: N/A    Reported or Detected: N/A    Scored by: MAUREEN Talamantes RPSGT

## 2019-10-23 NOTE — TELEPHONE ENCOUNTER
----- Message from Dillon Aquino MD sent at 10/22/2019  4:52 PM EDT -----  Approved  ----- Message -----  From: Delmer Murillo MA  Sent: 10/22/2019  12:12 PM EDT  To: Sleep Medicine [place] Provider    This sleep study needs approval      If approved please sign and return to clerical pool  If denied please include reasons why  Also provide alternative testing if warranted  Please sign and return to clerical pool

## 2019-10-24 ENCOUNTER — TELEPHONE (OUTPATIENT)
Dept: CARDIOLOGY CLINIC | Facility: CLINIC | Age: 79
End: 2019-10-24

## 2019-10-24 NOTE — TELEPHONE ENCOUNTER
Knee scraping - stem cell implant - sx tbd - can pt hold xarelto for three days - Dr Meryle Saba - advise

## 2019-10-25 ENCOUNTER — TELEPHONE (OUTPATIENT)
Dept: CARDIOLOGY CLINIC | Facility: CLINIC | Age: 79
End: 2019-10-25

## 2019-10-25 NOTE — TELEPHONE ENCOUNTER
Patient states that Dr Rolando Burnette needs something in writing stating that she may hold her Xarelto   Please fax to [phone]

## 2019-10-25 NOTE — TELEPHONE ENCOUNTER
Pre  Op  Clearance note- Cardiology    [facility]   78 y o   female  1940      MD Claudine Nick :   Patient's chart was reviewed for preop clearance  Patient was seen in our office on 10/16/2019  Patient has past medical history significant for than essential hypertension, paroxysmal atrial fibrillation, mixed hyperlipidemia, sleep apnea, meningioma and bilateral lower extremity edema  Patient is now scheduled for knee scraping for stem cell implant  Patient has no clinical evidence of heart failure or ischemia or active arrhythmia  Patient's last cardiac workup including echo report as well as stress test reports were reviewed and it shows normal LV systolic function without any significant valvular disease and nonischemic nuclear where she walked on the treadmill for 5 minutes  In my opinion patient is in optimum condition for the procedure as planned  Patient is low risk for the surgery as planned  Continue current cardiac medications  Patient can hold  Aspirin for  5-7 days as required for surgery  Patient can hold Xarelto for 3 days before the procedure  Please restart after the procedure when okay from surgical point of view and advise patient to contact our office  If you have any question please do not hesitate to call us at our office of [facility]  Phone # [phone]  Lab Results   Component Value Date    WBC 12 73 (H) 08/24/2019    HGB 12 6 08/24/2019    HCT 41 6 08/24/2019    MCV 95 08/24/2019     08/24/2019     Lab Results   Component Value Date    CREATININE 0 85 08/24/2019     Lab Results   Component Value Date    GLUF 99 01/04/2019       DR Colletta Croissant   10/25/2019  11:35 AM

## 2019-10-28 ENCOUNTER — TELEPHONE (OUTPATIENT)
Dept: CARDIOLOGY CLINIC | Facility: CLINIC | Age: 79
End: 2019-10-28

## 2019-10-28 NOTE — TELEPHONE ENCOUNTER
Patient made aware of Sleep Study results  Gave patient recommendations  Pt verbalized an understanding

## 2019-10-28 NOTE — TELEPHONE ENCOUNTER
----- Message from Yaakov Decker MD sent at 10/28/2019 12:07 PM EDT -----  Patient's sleep study is abnormal at home  They have recommended she should get a full study with CPAP  She should be seen by Pulmonary  Or she can call her primary care doctor

## 2019-11-21 NOTE — TELEPHONE ENCOUNTER
Spoke w/patient;made aware [facility] sent a notification the Avapro 300 mg is currently out of stock  Pt stated,she would like to go through her local pharmacy-she will call and seek pricing and availability      Pt will call back if she decides to go through local pharmacy and/or ask provider for an alternative

## 2019-11-22 DIAGNOSIS — I10 BENIGN HYPERTENSION: Primary | ICD-10-CM

## 2019-11-22 RX ORDER — IRBESARTAN 300 MG/1
300 TABLET ORAL DAILY
Qty: 90 TABLET | Refills: 3 | Status: SHIPPED | OUTPATIENT
Start: 2019-11-22 | End: 2020-12-16 | Stop reason: SDUPTHER

## 2019-12-12 NOTE — PROGRESS NOTES
Progress Note - Cardiology Office  [facility]    Claudine Alfredo 78 y o  female MRN: [id number]  : 1940  Encounter: [id number]      Assessment:     1  Paroxysmal atrial fibrillation (HCC)    2  Benign hypertension    3  Mixed hyperlipidemia    4  Morbid obesity ([address] )    5  Meningioma ([facility] )    6  RAJWINDER (obstructive sleep apnea)        Discussion Summary and Plan:  1  Paroxysmal atrial fibrillation  Patient is staying in sinus rhythm  She had 1 Kylah Spore current atrial fibrillation but spontaneously went back to sinus rhythm  Her Jennifer Vasc score is high  She need to be on Xarelto therapy  Aspirin discontinued  Issues related to Xarelto discussed with them  No more reoccurrence of atrial fibrillation if she had any more episode we will consider flecainide  2  Essential hypertension  Patient blood pressure is now well controlled with, Avapro and now she takes Maxzide half tablet every other day  Electrolytes were acceptable  Continue same medications  No leg edema noted     3  Dyslipidemia  Continue statins  cholesterol profile is acceptable continue current dose of statins      4  Obesity with BMI around 39  Advised to lose weight  Patient just had knee surgery she wants to more active now  5  Abnormal sleep screen  She is scheduled to have home sleep study     6  Bilateral lower extremity edema  Much improved since she is on diuretic and amlodipine discontinued  7  History of meningioma    Please call [phone] if any questions  Counseling :  A description of the counseling  Goals and Barriers  Patient's ability to self care: Yes  Medication side effect reviewed with patient in detail and all their questions answered to their satisfaction  HPI :     Marisa Alvarez is a 78y o  year old female who came for follow up   Patient was recently admitted to [facility] with palpitations and rapid heartbeat and was found to be in AFib with rapid ventricular rate  She has a past medical history significant for essential hypertension, obesity with BMI around 39, dyslipidemia, previous history of atrial fibrillation but not on antithrombotic therapy as she was getting brief episodes, who noted she went into AFib earlier that morning and was sent to the emergency room  Patient spontaneously converted back to sinus rhythm and was started on antithrombotic therapy and added low-dose metoprolol  Currently she came for follow-up  She did have some rash she is not sure it related to Toprol XL or Xarelto but it has slowly improved     At this time she denies any chest pain any shortness of breath  She has some chronic shortness of breath which is not changed  No nausea no vomiting no PND no orthopnea no palpitations at all  She does monitor heart rate regularly at generally is around 70 beats per minute  12/16/2019  Above reviewed  Patient came for follow-up  Blood pressure is now better  She had her knee surgery done  His still swollen but much better  She has a past medical history significant for atrial fibrillation currently in sinus rhythm heart rate 86 beats per minute, essential hypertension, dyslipidemia, obesity with BMI around 39 and has been doing well  No new complaints no episodes of UTI  No palpitations since her last episode she has no new issues  No fever no chills no nausea no vomiting no PND no orthopnea  Review of Systems   Constitutional: Positive for activity change  Negative for chills, diaphoresis, fever and unexpected weight change  HENT: Negative for congestion  Eyes: Negative for discharge and redness  Respiratory: Negative for cough, chest tightness, shortness of breath and wheezing  Cardiovascular: Negative  Negative for chest pain, palpitations and leg swelling  Gastrointestinal: Negative for abdominal pain, diarrhea and nausea  Endocrine: Negative      Genitourinary: Negative for decreased urine volume and urgency  Musculoskeletal: Positive for arthralgias and back pain  Negative for gait problem  Skin: Negative for rash and wound  Allergic/Immunologic: Negative  Neurological: Negative for dizziness, seizures, syncope, weakness, light-headedness and headaches  Hematological: Negative  Psychiatric/Behavioral: Negative for agitation and confusion  The patient is nervous/anxious  Historical Information   Past Medical History:   Diagnosis Date    A-fib ([facility] )     Cancer ([facility] )     Hyperlipidemia     Hypertension     UTI (urinary tract infection)      Past Surgical History:   Procedure Laterality Date    HYSTERECTOMY      JOINT REPLACEMENT       Social History     Substance and Sexual Activity   Alcohol Use Not Currently    Frequency: Never    Comment: rarely     Social History     Substance and Sexual Activity   Drug Use No     Social History     Tobacco Use   Smoking Status Never Smoker   Smokeless Tobacco Never Used     Family History: History reviewed  No pertinent family history      Meds/Allergies     Allergies   Allergen Reactions    Macrodantin [Nitrofurantoin Macrocrystal] Vomiting    Nitrofurantoin     Oxycodone-Acetaminophen Vomiting    Sulfa Antibiotics        Current Outpatient Medications:     atorvastatin (LIPITOR) 40 mg tablet, Take 40 mg by mouth daily, Disp: , Rfl:     irbesartan (AVAPRO) 300 mg tablet, Take 1 tablet (300 mg total) by mouth daily, Disp: 90 tablet, Rfl: 3    metoprolol succinate (TOPROL-XL) 25 mg 24 hr tablet, Take 1 tablet (25 mg total) by mouth daily, Disp: 90 tablet, Rfl: 3    Multiple Vitamins-Minerals (CENTRUM ADULTS PO), Take by mouth, Disp: , Rfl:     rivaroxaban (XARELTO) 20 mg tablet, Take 1 tablet (20 mg total) by mouth daily with dinner, Disp: 30 tablet, Rfl: 6    triamterene-hydrochlorothiazide (MAXZIDE-25) 37 5-25 mg per tablet, Take 0 5 tablets by mouth daily (Patient taking differently: Take 0 5 tablets by mouth daily ), Disp: 45 tablet, Rfl: 1    Cholecalciferol (VITAMIN D-3) 1000 units CAPS, Take by mouth, Disp: , Rfl:     Vitals: Blood pressure 130/80, pulse 83, height 5' 7 5" (1 715 m), weight 117 kg (257 lb), SpO2 94 %  Body mass index is 39 66 kg/m²  Vitals:    12/16/19 1059   Weight: 117 kg (257 lb)     BP Readings from Last 3 Encounters:   12/16/19 130/80   10/16/19 126/84   09/25/19 138/74         Physical Exam   Constitutional: She is oriented to person, place, and time  She appears well-developed and well-nourished  No distress  HENT:   Head: Normocephalic and atraumatic  Eyes: Pupils are equal, round, and reactive to light  Neck: Neck supple  No JVD present  No tracheal deviation present  No thyromegaly present  Cardiovascular: Normal rate, regular rhythm, S1 normal, S2 normal and intact distal pulses  Exam reveals no gallop, no S3, no S4, no distant heart sounds and no friction rub  Murmur heard  Systolic (ejection) murmur is present with a grade of 2/6  Pulmonary/Chest: Effort normal and breath sounds normal  No respiratory distress  She has no wheezes  She has no rales  She exhibits no tenderness  Abdominal: Soft  Bowel sounds are normal  She exhibits no distension  There is no tenderness  Musculoskeletal: She exhibits no edema or deformity  No edema   Neurological: She is alert and oriented to person, place, and time  Skin: Skin is warm and dry  No rash noted  She is not diaphoretic  No pallor  Psychiatric: She has a normal mood and affect  Her behavior is normal  Judgment normal        Diagnostic Studies Review Cardio:    Echo Doppler  Echo Doppler done 08/23/2019 shows EF 60%, left atrium size was normal, right atrium mildly dilated, no significant valvular disease  Nuclear stress test   Nuclear stress test done 10/09/2019 was probably normal   Exercised for 5 minutes and 51 seconds  EF was 75%  No perfusion abnormality was noted  There was imaged artifact noted      EKG:  Twelve lead EKG done 2019 shows normal sinus rhythm heart rate 70 beats per minute  Twelve lead EKG done today 10/16/2019 shows normal sinus rhythm heart rate 74 beats per minute  Twelve lead EKG 2019 shows normal sinus rhythm heart rate 86 beats per minute  She is staying in sinus rhythm normal intervals  Cardiac testing:   Results for orders placed during the hospital encounter of 19   Echo complete with contrast if indicated    [address]  [address] [facility]  Carlie Espinoza   [phone]    Transthoracic Echocardiogram  2D, M-mode, Doppler, and Color Doppler    Study date:  23-Aug-2019    Patient: [facility]  MR number: [id number]  Account number: [de-identified]  : 1940  Age: 78 years  Gender: Female  Status: Outpatient  Location: Bedside  Height: 67 in  Weight: 250 6 lb  BP: 152/ 72 mmHg    Indications: A Fib  Diagnoses: I48 0 - Atrial fibrillation    Sonographer:  CHENG Arias  Primary Physician:  Noa Prakash MD  Referring Physician:  Zac Brown MD  Group:  [facility]  Interpreting Physician:  Zac Brown MD    SUMMARY    LEFT VENTRICLE:  Systolic function was normal  Ejection fraction was estimated in the range of 55 % to 60 % to be 60 %  There were no regional wall motion abnormalities  Wall thickness was mildly increased  There was mild concentric hypertrophy  RIGHT ATRIUM:  The atrium was mildly dilated  MITRAL VALVE:  There was trace regurgitation  TRICUSPID VALVE:  There was mild regurgitation  Estimated peak PA pressure was 30 mmHg  HISTORY: PRIOR HISTORY: HTN,Hyperlipidemia,A Fib ,Cancer  PROCEDURE: The procedure was performed at the bedside  This was a routine study  The transthoracic approach was used  The study included complete 2D imaging, M-mode, complete spectral Doppler, and color Doppler  The heart rate was 69 bpm,  at the start of the study   Images were obtained from the parasternal, apical, subcostal, and suprasternal notch acoustic windows  Image quality was adequate  LEFT VENTRICLE: Size was normal  Systolic function was normal  Ejection fraction was estimated in the range of 55 % to 60 % to be 60 %  There were no regional wall motion abnormalities  Wall thickness was mildly increased  There was mild  concentric hypertrophy  DOPPLER: Left ventricular diastolic function parameters were normal for the patient's age  RIGHT VENTRICLE: The size was normal  Systolic function was normal     LEFT ATRIUM: Size was normal     RIGHT ATRIUM: The atrium was mildly dilated  MITRAL VALVE: There was normal leaflet separation  DOPPLER: The transmitral velocity was within the normal range  There was no evidence for stenosis  There was trace regurgitation  AORTIC VALVE: The valve was trileaflet  Leaflets exhibited mildly increased thickness and normal cuspal separation  DOPPLER: Transaortic velocity was within the normal range  There was no evidence for stenosis  There was no regurgitation  TRICUSPID VALVE: The valve structure was normal  There was normal leaflet separation  DOPPLER: The transtricuspid velocity was within the normal range  There was no evidence for stenosis  There was mild regurgitation  Estimated peak PA  pressure was 30 mmHg  PULMONIC VALVE: DOPPLER: There was no significant regurgitation  PERICARDIUM: There was no thickening or calcification  There was no pericardial effusion  AORTA: The root exhibited normal size  SYSTEMIC VEINS: IVC: The inferior vena cava was normal in size   Respirophasic changes were normal     SYSTEM MEASUREMENT TABLES    2D mode  AoR Diam 2D: 3 3 cm  LA Diam (2D): 3 9 cm  LA/Ao (2D): 1 18  FS (2D Teich): 28 2 %  IVSd (2D): 1 21 cm  LVDEV: 78 1 cmï¾³  LVESV: 35 3 cmï¾³  LVIDd(2D): 4 19 cm  LVISd (2D): 3 01 cm  LVOT Area 2D: 3 14 cmï¾²  LVPWd (2D): 1 2 cm  SV (Teich): 42 8 cmï¾³    Apical four chamber  LVEF A4C: 57 %    Unspecified Scan Mode  REYNALDO Cont Eq (Peak Chemo): 2 58 cmï¾²  LVOT Diam : 2 cm  LVOT Vmax: 1290 mm/s  LVOT Vmax; Mean: 1290 mm/s  Peak Grad ; Mean: 7 mm[Hg]  MV Peak A Chemo: 622 mm/s  MV Peak E Chemo  Mean: 795 mm/s  MVA (PHT): 4 4 cmï¾²  PHT: 50 ms  Max P mm[Hg]  V Max: 2530 mm/s  Vmax: 2250 mm/s  RA Area: 19 5 cmï¾²  RA Volume: 59 7 cmï¾³  TAPSE: 2 1 cm    [employer] Accredited Echocardiography Laboratory    Prepared and electronically signed by    Zach Turner MD  Signed 23-Aug-2019 16:20:25       Imaging:  Chest X-Ray:   No Chest XR results available for this patient  CT-scan of the chest:     No CTA results available for this patient    Lab Review   Lab Results   Component Value Date    WBC 12 73 (H) 2019    HGB 12 6 2019    HCT 41 6 2019    MCV 95 2019    RDW 13 7 2019     2019     BMP:  Lab Results   Component Value Date    SODIUM 137 2019    K 4 1 2019     2019    CO2 24 2019    BUN 18 2019    CREATININE 0 85 2019    GLUC 108 2019    GLUF 99 2019    CALCIUM 8 5 2019    EGFR 65 2019    MG 2 2 2016     LFT:  Lab Results   Component Value Date    AST 20 05/10/2019    ALT 24 05/10/2019    ALKPHOS 81 05/10/2019    TP 7 1 05/10/2019    ALB 3 3 (L) 05/10/2019      Lab Results   Component Value Date    YKE6FNEOTTDW 3 134 2019     No results found for: HGBA1C  Lipid Profile:   Lab Results   Component Value Date    CHOLESTEROL 193 05/10/2019     (H) 05/10/2019    LDLCALC 67 05/10/2019    TRIG 50 05/10/2019     Lab Results   Component Value Date    CHOLESTEROL 193 05/10/2019    CHOLESTEROL 191 2019     Lab Results   Component Value Date    CKTOTAL 69 2018    TROPONINI <0 02 2019     Lab Results   Component Value Date    NTBNP 143 2018            Dr Zach Turner MD [facility]      "This note has been constructed using a voice recognition system  Therefore there may be syntax, spelling, and/or grammatical errors   Please call if you have any questions  "

## 2019-12-16 ENCOUNTER — OFFICE VISIT (OUTPATIENT)
Dept: CARDIOLOGY CLINIC | Facility: CLINIC | Age: 79
End: 2019-12-16
Payer: COMMERCIAL

## 2019-12-16 VITALS
BODY MASS INDEX: 38.95 KG/M2 | SYSTOLIC BLOOD PRESSURE: 130 MMHG | OXYGEN SATURATION: 94 % | HEART RATE: 83 BPM | HEIGHT: 68 IN | DIASTOLIC BLOOD PRESSURE: 80 MMHG | WEIGHT: 257 LBS

## 2019-12-16 DIAGNOSIS — E66.01 MORBID OBESITY (HCC): ICD-10-CM

## 2019-12-16 DIAGNOSIS — I10 BENIGN HYPERTENSION: ICD-10-CM

## 2019-12-16 DIAGNOSIS — D32.9 MENINGIOMA (HCC): ICD-10-CM

## 2019-12-16 DIAGNOSIS — I48.0 PAROXYSMAL ATRIAL FIBRILLATION (HCC): ICD-10-CM

## 2019-12-16 DIAGNOSIS — G47.33 OSA (OBSTRUCTIVE SLEEP APNEA): ICD-10-CM

## 2019-12-16 DIAGNOSIS — E78.2 MIXED HYPERLIPIDEMIA: ICD-10-CM

## 2019-12-16 PROCEDURE — 93000 ELECTROCARDIOGRAM COMPLETE: CPT | Performed by: INTERNAL MEDICINE

## 2019-12-16 PROCEDURE — 99214 OFFICE O/P EST MOD 30 MIN: CPT | Performed by: INTERNAL MEDICINE

## 2019-12-16 PROCEDURE — 3079F DIAST BP 80-89 MM HG: CPT | Performed by: INTERNAL MEDICINE

## 2019-12-16 PROCEDURE — 3075F SYST BP GE 130 - 139MM HG: CPT | Performed by: INTERNAL MEDICINE

## 2019-12-19 ENCOUNTER — OFFICE VISIT (OUTPATIENT)
Dept: NEUROLOGY | Facility: CLINIC | Age: 79
End: 2019-12-19
Payer: COMMERCIAL

## 2019-12-19 VITALS
DIASTOLIC BLOOD PRESSURE: 84 MMHG | WEIGHT: 257 LBS | SYSTOLIC BLOOD PRESSURE: 128 MMHG | BODY MASS INDEX: 38.95 KG/M2 | HEIGHT: 68 IN | HEART RATE: 72 BPM

## 2019-12-19 DIAGNOSIS — D18.02 VENOUS ANGIOMA OF BRAIN (HCC): ICD-10-CM

## 2019-12-19 DIAGNOSIS — D32.9 MENINGIOMA (HCC): Primary | ICD-10-CM

## 2019-12-19 PROCEDURE — 99204 OFFICE O/P NEW MOD 45 MIN: CPT | Performed by: PSYCHIATRY & NEUROLOGY

## 2019-12-19 NOTE — PROGRESS NOTES
Outpatient Neurology History and Physical  eK Cheema  [id number]  53 y o   1940          Consult: Yes    Ranjit Linn MD      Chief Complaint   Patient presents with    Neurologic Problem     Meningioma-NP           History Obtained from: patient and      HPI:     Mrs Matthew Cota is a 79 yo F with PMH of A fib and meningioma presents to [facility] care  8 years ago patient had sudden onset tinnitus in left ear  At that time she had MRI brain which had revealed 7 mm meningioma posterior medially to right parietal lobe adjacent to saggital sinus and cortical vein  There was also presence of small developmental venous angioma in right temporal lobe/temporoparietal region, variant of normal parenchymal venous drainage  Patient has not had any symptom correlating with these lesions  She had one headache few weeks ago that originated at base of neck and radiated to top of head  This resolved spontaneously  Patient says that for her age she's doing very well  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      She is on xarelto for A fib       Past Medical History:   Diagnosis Date    A-fib ([facility] )     Cancer ([facility] )     Hyperlipidemia     Hypertension     UTI (urinary tract infection)                Current Outpatient Medications on File Prior to Visit   Medication Sig Dispense Refill    atorvastatin (LIPITOR) 40 mg tablet Take 40 mg by mouth daily      irbesartan (AVAPRO) 300 mg tablet Take 1 tablet (300 mg total) by mouth daily 90 tablet 3    metoprolol succinate (TOPROL-XL) 25 mg 24 hr tablet Take 1 tablet (25 mg total) by mouth daily 90 tablet 3    Multiple Vitamins-Minerals (CENTRUM ADULTS PO) Take by mouth      rivaroxaban (XARELTO) 20 mg tablet Take 1 tablet (20 mg total) by mouth daily with dinner 30 tablet 6    triamterene-hydrochlorothiazide (MAXZIDE-25) 37 5-25 mg per tablet Take 0 5 tablets by mouth daily (Patient taking differently: Take 0 5 tablets by mouth daily ) 45 tablet 1    Cholecalciferol (VITAMIN D-3) 1000 units CAPS Take by mouth       No current facility-administered medications on file prior to visit  Allergies   Allergen Reactions    Macrodantin [Nitrofurantoin Macrocrystal] Vomiting    Nitrofurantoin     Oxycodone-Acetaminophen Vomiting    Sulfa Antibiotics          History reviewed  No pertinent family history               Past Surgical History:   Procedure Laterality Date    HYSTERECTOMY      JOINT REPLACEMENT             Social History     Socioeconomic History    Marital status: /Civil Union     Spouse name: Not on file    Number of children: Not on file    Years of education: Not on file    Highest education level: Not on file   Occupational History    Not on file   Social Needs    Financial resource strain: Not on file    Food insecurity:     Worry: Not on file     Inability: Not on file    Transportation needs:     Medical: Not on file     Non-medical: Not on file   Tobacco Use    Smoking status: Never Smoker    Smokeless tobacco: Never Used   Substance and Sexual Activity    Alcohol use: Not Currently     Frequency: Never     Comment: rarely    Drug use: No    Sexual activity: Not on file   Lifestyle    Physical activity:     Days per week: Not on file     Minutes per session: Not on file    Stress: Not on file   Relationships    Social connections:     Talks on phone: Not on file     Gets together: Not on file     Attends Muslim service: Not on file     Active member of club or organization: Not on file     Attends meetings of clubs or organizations: Not on file     Relationship status: Not on file    Intimate partner violence:     Fear of current or ex partner: Not on file     Emotionally abused: Not on file     Physically abused: Not on file     Forced sexual activity: Not on file   Other Topics Concern    Not on file   Social History Narrative    Not on file       Review of Systems  Refer to positive review of systems in HPI  Constitutional- No fever  Eyes- No visual change  ENT- Hearing normal  CV- No chest pain  Resp- No Shortness of breath  GI- No diarrhea  - Bladder normal  MS- No Arthritis   Skin- No rash  Psych- No depression  Endo- No DM  Heme- No nodes    PHYSICAL EXAM:    Vitals:    12/19/19 1507   BP: 128/84   BP Location: Left arm   Patient Position: Sitting   Cuff Size: Large   Pulse: 72   Weight: 117 kg (257 lb)   Height: 5' 7 5" (1 715 m)         Appearance: No Acute Distress  Ophthalmoscopic: Disc Flat, Normal fundus  Carotid/Heart/Peripheral Vascular: No Bruits, RRR  Orientation: Awake, Alert, and Oriented x 3  Mental status:  Memory: Registation 3/3 Recall 3/3  Attention: Normal  Knowledge: Appropriate  Language: No aphasia  Speech: No dysarthria  Cranial Nerves:  2 No Visual Defect on Confrontation; Pupils round, equal, reactive to light  3,4,6 Extraocular Movements Intact; no nystagmus  5 Facial Sensation Intact  7 No facial asymmetry  8 Intact hearing  9,10 Palate symmetric, normal gag  11 Good shoulder shrug  12 Tongue Midline  Gait: Stable, No ataxia, can perform tandem walking  Coordination: No ataxia with finger to nose testing and heel to shin testing  Sensory: Intact, Symmetric to Pinprick, Light Touch, Vibration, and Joint Position  Muscle Tone: Normal  Muscle exam  Arm Right Left Leg Right Left   Deltoid 5/5 5/5 Iliopsoas 5/5 5/5   Biceps 5/5 5/5 Quads 5/5 5/5   Triceps 5/5 5/5 Hamstrings 5/5 5/5   Wrist Extension 5/5 5/5 Ankle Dorsi Flexion 5/5 5/5   Wrist Flexion 5/5 5/5 Ankle Plantar Flexion 5/5 5/5   Interossei 5/5 5/5 Ankle Eversion 5/5 5/5   APB 5/5 5/5 Ankle Inversion 5/5 5/5       Reflexes   RJ BJ TJ KJ AJ Plantars Abdalla's   Right 2+ 2+ 2+ 1+ 0 Downgoing Not present   Left 2+ 2+ 2+ 1+ 0 Downgoing Not present         Personal review of         MRI brain from 2016  Assessment/Plan:     1  Meningioma ([facility] )  MRI brain with and without contrast   2   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[facility] )  MRI brain with and without contrast     Patient has no symptoms from this lesion but has not had f/u imaging in over 3 years  Will get 3T MRI brain as follow up for both lesions  If concerning then will refer her to neurosurgery  Counseling Documentation:  The patient and/or patient's family were  counseled regarding diagnostic results  Instructions for management,risk factor reductions,prognosis of disease were discussed  Patient and family were educated regarding impressions,risks and benefits of treatment options,importance of compliance with treatment  Total time of encounter: 45 min   More than 50% of time was spent in counseling and coordination of care of patient  CAMPOS Ritter    [facility]  [address]  [place]

## 2019-12-19 NOTE — LETTER
December 19, 2019     Ranjit Linn MD  [address]  [address] [facility] [address]    Patient: Ke Cheema   YOB: 1940   Date of Visit: 12/19/2019       Dear Dr Danie Nevarez:    Thank you for referring Ke Cheema to me for evaluation  Below are my notes for this consultation  If you have questions, please do not hesitate to call me  I look forward to following your patient along with you  Sincerely,        Kisha Hairston MD        CC: No Recipients  Kisha Hairston MD  12/19/2019  3:44 PM  Sign at close encounter  Outpatient Neurology History and Physical  Ke Cheema  [id number]  84 y o   1940          Consult: Yes    Ranjit Linn MD      Chief Complaint   Patient presents with    Neurologic Problem     Meningioma-NP           History Obtained from: patient and      HPI:     Mrs Matthew Cota is a 79 yo F with PMH of A fib and meningioma presents to [facility] care  8 years ago patient had sudden onset tinnitus in left ear  At that time she had MRI brain which had revealed 7 mm meningioma posterior medially to right parietal lobe adjacent to saggital sinus and cortical vein  There was also presence of small developmental venous angioma in right temporal lobe/temporoparietal region, variant of normal parenchymal venous drainage  Patient has not had any symptom correlating with these lesions  She had one headache few weeks ago that originated at base of neck and radiated to top of head  This resolved spontaneously  Patient says that for her age she's doing very well  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      She is on xarelto for A fib       Past Medical History:   Diagnosis Date    [facility]     [facility]     Hyperlipidemia     Hypertension     UTI (urinary tract infection)                Current Outpatient Medications on File Prior to Visit   Medication Sig Dispense Refill    atorvastatin (LIPITOR) 40 mg tablet Take 40 mg by mouth daily      irbesartan (AVAPRO) 300 mg tablet Take 1 tablet (300 mg total) by mouth daily 90 tablet 3    metoprolol succinate (TOPROL-XL) 25 mg 24 hr tablet Take 1 tablet (25 mg total) by mouth daily 90 tablet 3    Multiple Vitamins-Minerals (CENTRUM ADULTS PO) Take by mouth      rivaroxaban (XARELTO) 20 mg tablet Take 1 tablet (20 mg total) by mouth daily with dinner 30 tablet 6    triamterene-hydrochlorothiazide (MAXZIDE-25) 37 5-25 mg per tablet Take 0 5 tablets by mouth daily (Patient taking differently: Take 0 5 tablets by mouth daily ) 45 tablet 1    Cholecalciferol (VITAMIN D-3) 1000 units CAPS Take by mouth       No current facility-administered medications on file prior to visit  Allergies   Allergen Reactions    Macrodantin [Nitrofurantoin Macrocrystal] Vomiting    Nitrofurantoin     Oxycodone-Acetaminophen Vomiting    Sulfa Antibiotics          History reviewed  No pertinent family history               Past Surgical History:   Procedure Laterality Date    HYSTERECTOMY      JOINT REPLACEMENT             Social History     Socioeconomic History    Marital status: /Civil Union     Spouse name: Not on file    Number of children: Not on file    Years of education: Not on file    Highest education level: Not on file   Occupational History    Not on file   Social Needs    Financial resource strain: Not on file    Food insecurity:     Worry: Not on file     Inability: Not on file    Transportation needs:     Medical: Not on file     Non-medical: Not on file   Tobacco Use    Smoking status: Never Smoker    Smokeless tobacco: Never Used   Substance and Sexual Activity    Alcohol use: Not Currently     Frequency: Never     Comment: rarely    Drug use: No    Sexual activity: Not on file   Lifestyle    Physical activity:     Days per week: Not on file     Minutes per session: Not on file    Stress: Not on file   Relationships    Social connections:     Talks on phone: Not on file     Gets together: Not on file     Attends Taoism service: Not on file     Active member of club or organization: Not on file     Attends meetings of clubs or organizations: Not on file     Relationship status: Not on file    Intimate partner violence:     Fear of current or ex partner: Not on file     Emotionally abused: Not on file     Physically abused: Not on file     Forced sexual activity: Not on file   Other Topics Concern    Not on file   Social History Narrative    Not on file       Review of Systems  Refer to positive review of systems in HPI  Constitutional- No fever  Eyes- No visual change  ENT- Hearing normal  CV- No chest pain  Resp- No Shortness of breath  GI- No diarrhea  - Bladder normal  MS- No Arthritis   Skin- No rash  Psych- No depression  Endo- No DM  Heme- No nodes    PHYSICAL EXAM:    Vitals:    12/19/19 1507   BP: 128/84   BP Location: Left arm   Patient Position: Sitting   Cuff Size: Large   Pulse: 72   Weight: 117 kg (257 lb)   Height: 5' 7 5" (1 715 m)         Appearance: No Acute Distress  Ophthalmoscopic: Disc Flat, Normal fundus  Carotid/Heart/Peripheral Vascular: No Bruits, RRR  Orientation: Awake, Alert, and Oriented x 3  Mental status:  Memory: Registation 3/3 Recall 3/3  Attention: Normal  Knowledge: Appropriate  Language: No aphasia  Speech: No dysarthria  Cranial Nerves:  2 No Visual Defect on Confrontation; Pupils round, equal, reactive to light  3,4,6 Extraocular Movements Intact; no nystagmus  5 Facial Sensation Intact  7 No facial asymmetry  8 Intact hearing  9,10 Palate symmetric, normal gag  11 Good shoulder shrug  12 Tongue Midline  Gait: Stable, No ataxia, can perform tandem walking  Coordination: No ataxia with finger to nose testing and heel to shin testing  Sensory: Intact, Symmetric to Pinprick, Light Touch, Vibration, and Joint Position  Muscle Tone: Normal  Muscle exam  Arm Right Left Leg Right Left   Deltoid 5/5 5/5 Iliopsoas 5/5 5/5   Biceps 5/5 5/5 Quads 5/5 5/5   Triceps 5/5 5/5 Hamstrings 5/5 5/5   Wrist Extension 5/5 5/5 Ankle Dorsi Flexion 5/5 5/5   Wrist Flexion 5/5 5/5 Ankle Plantar Flexion 5/5 5/5   Interossei 5/5 5/5 Ankle Eversion 5/5 5/5   APB 5/5 5/5 Ankle Inversion 5/5 5/5       Reflexes   RJ BJ TJ KJ AJ Plantars Abdalla's   Right 2+ 2+ 2+ 1+ 0 Downgoing Not present   Left 2+ 2+ 2+ 1+ 0 Downgoing Not present         Personal review of         MRI brain from 2016  Assessment/Plan:     1  Meningioma ([facility] [address] )  MRI brain with and without contrast   2  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[facility] [address] )  MRI brain with and without contrast     Patient has no symptoms from this lesion but has not had f/u imaging in over 3 years  Will get 3T MRI brain as follow up for both lesions  If concerning then will refer her to neurosurgery  Counseling Documentation:  The patient and/or patient's family were  counseled regarding diagnostic results  Instructions for management,risk factor reductions,prognosis of disease were discussed  Patient and family were educated regarding impressions,risks and benefits of treatment options,importance of compliance with treatment  Total time of encounter: 45 min   More than 50% of time was spent in counseling and coordination of care of patient  CAMPOS Goldsmith    [facility]  [address]  [address]

## 2019-12-20 ENCOUNTER — TELEPHONE (OUTPATIENT)
Dept: NEUROLOGY | Facility: CLINIC | Age: 79
End: 2019-12-20

## 2019-12-20 DIAGNOSIS — I10 ESSENTIAL HYPERTENSION: Primary | ICD-10-CM

## 2019-12-20 NOTE — TELEPHONE ENCOUNTER
PATIENT NEEDS BUN/CREATININE SLIP PRIOR TO MRI ON 1/5/2020  PLEASE PLACE ORDER AND [place] [facility]

## 2019-12-31 ENCOUNTER — APPOINTMENT (OUTPATIENT)
Dept: LAB | Facility: HOSPITAL | Age: 79
End: 2019-12-31
Attending: PSYCHIATRY & NEUROLOGY
Payer: COMMERCIAL

## 2019-12-31 ENCOUNTER — TRANSCRIBE ORDERS (OUTPATIENT)
Dept: ADMINISTRATIVE | Facility: HOSPITAL | Age: 79
End: 2019-12-31

## 2019-12-31 DIAGNOSIS — I10 ESSENTIAL HYPERTENSION: ICD-10-CM

## 2019-12-31 LAB
BUN SERPL-MCNC: 19 MG/DL (ref 5–25)
CREAT SERPL-MCNC: 0.79 MG/DL (ref 0.6–1.3)
GFR SERPL CREATININE-BSD FRML MDRD: 71 ML/MIN/1.73SQ M

## 2019-12-31 PROCEDURE — 84520 ASSAY OF UREA NITROGEN: CPT

## 2019-12-31 PROCEDURE — 36415 COLL VENOUS BLD VENIPUNCTURE: CPT

## 2019-12-31 PROCEDURE — 82565 ASSAY OF CREATININE: CPT

## 2020-01-26 DIAGNOSIS — I48.0 PAROXYSMAL ATRIAL FIBRILLATION (HCC): ICD-10-CM

## 2020-01-27 RX ORDER — RIVAROXABAN 20 MG/1
TABLET, FILM COATED ORAL
Qty: 90 TABLET | Refills: 0 | Status: SHIPPED | OUTPATIENT
Start: 2020-01-27 | End: 2020-04-22

## 2020-04-21 DIAGNOSIS — I48.0 PAROXYSMAL ATRIAL FIBRILLATION (HCC): ICD-10-CM

## 2020-04-22 DIAGNOSIS — R60.0 BILATERAL EDEMA OF LOWER EXTREMITY: ICD-10-CM

## 2020-04-22 DIAGNOSIS — I10 BENIGN HYPERTENSION: ICD-10-CM

## 2020-04-22 RX ORDER — RIVAROXABAN 20 MG/1
TABLET, FILM COATED ORAL
Qty: 90 TABLET | Refills: 0 | Status: SHIPPED | OUTPATIENT
Start: 2020-04-22 | End: 2020-07-16

## 2020-04-22 RX ORDER — TRIAMTERENE AND HYDROCHLOROTHIAZIDE 37.5; 25 MG/1; MG/1
0.5 TABLET ORAL DAILY
Qty: 30 TABLET | Refills: 3 | Status: SHIPPED | OUTPATIENT
Start: 2020-04-22 | End: 2020-07-10 | Stop reason: SDUPTHER

## 2020-05-06 ENCOUNTER — TELEMEDICINE (OUTPATIENT)
Dept: FAMILY MEDICINE CLINIC | Facility: CLINIC | Age: 80
End: 2020-05-06
Payer: COMMERCIAL

## 2020-05-06 VITALS — HEIGHT: 68 IN | HEART RATE: 72 BPM | BODY MASS INDEX: 38.95 KG/M2 | WEIGHT: 257 LBS

## 2020-05-06 DIAGNOSIS — E78.2 MIXED HYPERLIPIDEMIA: ICD-10-CM

## 2020-05-06 DIAGNOSIS — I48.0 PAROXYSMAL ATRIAL FIBRILLATION (HCC): ICD-10-CM

## 2020-05-06 DIAGNOSIS — I10 BENIGN HYPERTENSION: ICD-10-CM

## 2020-05-06 DIAGNOSIS — Z00.00 MEDICARE ANNUAL WELLNESS VISIT, SUBSEQUENT: Primary | ICD-10-CM

## 2020-05-06 PROCEDURE — 3079F DIAST BP 80-89 MM HG: CPT | Performed by: INTERNAL MEDICINE

## 2020-05-06 PROCEDURE — 1036F TOBACCO NON-USER: CPT | Performed by: INTERNAL MEDICINE

## 2020-05-06 PROCEDURE — 3074F SYST BP LT 130 MM HG: CPT | Performed by: INTERNAL MEDICINE

## 2020-05-06 PROCEDURE — 1170F FXNL STATUS ASSESSED: CPT | Performed by: INTERNAL MEDICINE

## 2020-05-06 PROCEDURE — 3008F BODY MASS INDEX DOCD: CPT | Performed by: INTERNAL MEDICINE

## 2020-05-06 PROCEDURE — 1160F RVW MEDS BY RX/DR IN RCRD: CPT | Performed by: INTERNAL MEDICINE

## 2020-05-06 PROCEDURE — 4040F PNEUMOC VAC/ADMIN/RCVD: CPT | Performed by: INTERNAL MEDICINE

## 2020-05-06 PROCEDURE — 1125F AMNT PAIN NOTED PAIN PRSNT: CPT | Performed by: INTERNAL MEDICINE

## 2020-05-06 PROCEDURE — G0439 PPPS, SUBSEQ VISIT: HCPCS | Performed by: INTERNAL MEDICINE

## 2020-06-04 ENCOUNTER — TELEPHONE (OUTPATIENT)
Dept: NEUROLOGY | Facility: CLINIC | Age: 80
End: 2020-06-04

## 2020-06-08 ENCOUNTER — OFFICE VISIT (OUTPATIENT)
Dept: NEUROLOGY | Facility: CLINIC | Age: 80
End: 2020-06-08
Payer: COMMERCIAL

## 2020-06-08 VITALS
BODY MASS INDEX: 38.95 KG/M2 | HEIGHT: 68 IN | DIASTOLIC BLOOD PRESSURE: 74 MMHG | HEART RATE: 88 BPM | WEIGHT: 257 LBS | SYSTOLIC BLOOD PRESSURE: 137 MMHG

## 2020-06-08 DIAGNOSIS — D32.9 MENINGIOMA (HCC): Primary | ICD-10-CM

## 2020-06-08 DIAGNOSIS — D18.02 VENOUS ANGIOMA OF BRAIN (HCC): ICD-10-CM

## 2020-06-08 PROCEDURE — 1036F TOBACCO NON-USER: CPT | Performed by: PSYCHIATRY & NEUROLOGY

## 2020-06-08 PROCEDURE — 99214 OFFICE O/P EST MOD 30 MIN: CPT | Performed by: PSYCHIATRY & NEUROLOGY

## 2020-06-08 PROCEDURE — 3078F DIAST BP <80 MM HG: CPT | Performed by: PSYCHIATRY & NEUROLOGY

## 2020-06-08 PROCEDURE — 4040F PNEUMOC VAC/ADMIN/RCVD: CPT | Performed by: PSYCHIATRY & NEUROLOGY

## 2020-06-08 PROCEDURE — 3008F BODY MASS INDEX DOCD: CPT | Performed by: PSYCHIATRY & NEUROLOGY

## 2020-06-08 PROCEDURE — 1160F RVW MEDS BY RX/DR IN RCRD: CPT | Performed by: PSYCHIATRY & NEUROLOGY

## 2020-06-08 PROCEDURE — 3075F SYST BP GE 130 - 139MM HG: CPT | Performed by: PSYCHIATRY & NEUROLOGY

## 2020-06-09 ENCOUNTER — TELEPHONE (OUTPATIENT)
Dept: CARDIOLOGY CLINIC | Facility: CLINIC | Age: 80
End: 2020-06-09

## 2020-06-09 ENCOUNTER — TELEPHONE (OUTPATIENT)
Dept: NEUROLOGY | Facility: CLINIC | Age: 80
End: 2020-06-09

## 2020-06-09 ENCOUNTER — OFFICE VISIT (OUTPATIENT)
Dept: CARDIOLOGY CLINIC | Facility: CLINIC | Age: 80
End: 2020-06-09
Payer: COMMERCIAL

## 2020-06-09 VITALS
DIASTOLIC BLOOD PRESSURE: 70 MMHG | WEIGHT: 262 LBS | BODY MASS INDEX: 39.71 KG/M2 | SYSTOLIC BLOOD PRESSURE: 110 MMHG | TEMPERATURE: 97.3 F | HEIGHT: 68 IN | OXYGEN SATURATION: 94 % | HEART RATE: 94 BPM

## 2020-06-09 DIAGNOSIS — R60.0 BILATERAL EDEMA OF LOWER EXTREMITY: ICD-10-CM

## 2020-06-09 DIAGNOSIS — G47.33 OSA (OBSTRUCTIVE SLEEP APNEA): ICD-10-CM

## 2020-06-09 DIAGNOSIS — Z11.59 SCREENING FOR VIRAL DISEASE: ICD-10-CM

## 2020-06-09 DIAGNOSIS — I10 BENIGN HYPERTENSION: ICD-10-CM

## 2020-06-09 DIAGNOSIS — D32.9 MENINGIOMA (HCC): Primary | ICD-10-CM

## 2020-06-09 DIAGNOSIS — E78.2 MIXED HYPERLIPIDEMIA: ICD-10-CM

## 2020-06-09 DIAGNOSIS — I48.0 PAROXYSMAL ATRIAL FIBRILLATION (HCC): ICD-10-CM

## 2020-06-09 DIAGNOSIS — E66.01 MORBID OBESITY (HCC): ICD-10-CM

## 2020-06-09 DIAGNOSIS — D18.02 VENOUS ANGIOMA OF BRAIN (HCC): ICD-10-CM

## 2020-06-09 PROCEDURE — 3008F BODY MASS INDEX DOCD: CPT | Performed by: INTERNAL MEDICINE

## 2020-06-09 PROCEDURE — 93000 ELECTROCARDIOGRAM COMPLETE: CPT | Performed by: INTERNAL MEDICINE

## 2020-06-09 PROCEDURE — 1160F RVW MEDS BY RX/DR IN RCRD: CPT | Performed by: INTERNAL MEDICINE

## 2020-06-09 PROCEDURE — 99215 OFFICE O/P EST HI 40 MIN: CPT | Performed by: INTERNAL MEDICINE

## 2020-06-09 PROCEDURE — 1036F TOBACCO NON-USER: CPT | Performed by: INTERNAL MEDICINE

## 2020-06-09 PROCEDURE — 3074F SYST BP LT 130 MM HG: CPT | Performed by: INTERNAL MEDICINE

## 2020-06-09 PROCEDURE — 4040F PNEUMOC VAC/ADMIN/RCVD: CPT | Performed by: INTERNAL MEDICINE

## 2020-06-09 PROCEDURE — 3078F DIAST BP <80 MM HG: CPT | Performed by: INTERNAL MEDICINE

## 2020-06-09 RX ORDER — FLECAINIDE ACETATE 50 MG/1
50 TABLET ORAL 2 TIMES DAILY
Qty: 60 TABLET | Refills: 1 | Status: SHIPPED | OUTPATIENT
Start: 2020-06-09 | End: 2020-07-11

## 2020-06-15 ENCOUNTER — TELEPHONE (OUTPATIENT)
Dept: CARDIOLOGY CLINIC | Facility: CLINIC | Age: 80
End: 2020-06-15

## 2020-06-16 ENCOUNTER — CLINICAL SUPPORT (OUTPATIENT)
Dept: CARDIOLOGY CLINIC | Facility: CLINIC | Age: 80
End: 2020-06-16
Payer: COMMERCIAL

## 2020-06-16 VITALS
HEART RATE: 70 BPM | HEIGHT: 68 IN | SYSTOLIC BLOOD PRESSURE: 130 MMHG | TEMPERATURE: 98.1 F | DIASTOLIC BLOOD PRESSURE: 74 MMHG | BODY MASS INDEX: 40.01 KG/M2 | WEIGHT: 264 LBS

## 2020-06-16 DIAGNOSIS — I48.0 PAROXYSMAL ATRIAL FIBRILLATION (HCC): Primary | ICD-10-CM

## 2020-06-16 DIAGNOSIS — I10 BENIGN HYPERTENSION: ICD-10-CM

## 2020-06-16 PROCEDURE — 93000 ELECTROCARDIOGRAM COMPLETE: CPT

## 2020-06-16 PROCEDURE — RECHECK: Performed by: INTERNAL MEDICINE

## 2020-06-30 ENCOUNTER — HOSPITAL ENCOUNTER (OUTPATIENT)
Dept: RADIOLOGY | Age: 80
Discharge: HOME | End: 2020-06-30
Payer: COMMERCIAL

## 2020-06-30 DIAGNOSIS — D32.9 MENINGIOMA (HCC): ICD-10-CM

## 2020-06-30 DIAGNOSIS — D18.02 VENOUS ANGIOMA OF BRAIN (HCC): ICD-10-CM

## 2020-06-30 PROCEDURE — 70553 MRI BRAIN STEM W/O & W/DYE: CPT

## 2020-06-30 PROCEDURE — A9585 GADOBUTROL INJECTION: HCPCS | Performed by: PSYCHIATRY & NEUROLOGY

## 2020-06-30 RX ADMIN — GADOBUTROL 12 ML: 604.72 INJECTION INTRAVENOUS at 15:02

## 2020-07-01 ENCOUNTER — TELEPHONE (OUTPATIENT)
Dept: NEUROLOGY | Facility: CLINIC | Age: 80
End: 2020-07-01

## 2020-07-10 DIAGNOSIS — R60.0 BILATERAL EDEMA OF LOWER EXTREMITY: ICD-10-CM

## 2020-07-10 DIAGNOSIS — I10 BENIGN HYPERTENSION: ICD-10-CM

## 2020-07-10 RX ORDER — TRIAMTERENE AND HYDROCHLOROTHIAZIDE 37.5; 25 MG/1; MG/1
1 TABLET ORAL DAILY
Qty: 90 TABLET | Refills: 3 | Status: SHIPPED | OUTPATIENT
Start: 2020-07-10 | End: 2020-11-12 | Stop reason: SDUPTHER

## 2020-07-11 DIAGNOSIS — I48.0 PAROXYSMAL ATRIAL FIBRILLATION (HCC): ICD-10-CM

## 2020-07-11 RX ORDER — FLECAINIDE ACETATE 50 MG/1
50 TABLET ORAL 2 TIMES DAILY
Qty: 60 TABLET | Refills: 1 | Status: SHIPPED | OUTPATIENT
Start: 2020-07-11 | End: 2020-08-11 | Stop reason: SDUPTHER

## 2020-07-15 DIAGNOSIS — I48.0 PAROXYSMAL ATRIAL FIBRILLATION (HCC): ICD-10-CM

## 2020-07-16 RX ORDER — RIVAROXABAN 20 MG/1
TABLET, FILM COATED ORAL
Qty: 90 TABLET | Refills: 0 | Status: SHIPPED | OUTPATIENT
Start: 2020-07-16 | End: 2020-10-10

## 2020-07-19 DIAGNOSIS — I48.0 PAROXYSMAL ATRIAL FIBRILLATION (HCC): ICD-10-CM

## 2020-07-20 RX ORDER — METOPROLOL SUCCINATE 25 MG/1
TABLET, EXTENDED RELEASE ORAL
Qty: 90 TABLET | Refills: 3 | Status: SHIPPED | OUTPATIENT
Start: 2020-07-20 | End: 2021-06-14

## 2020-08-11 DIAGNOSIS — I48.0 PAROXYSMAL ATRIAL FIBRILLATION (HCC): ICD-10-CM

## 2020-08-12 RX ORDER — FLECAINIDE ACETATE 50 MG/1
50 TABLET ORAL 2 TIMES DAILY
Qty: 180 TABLET | Refills: 0 | Status: SHIPPED | OUTPATIENT
Start: 2020-08-12 | End: 2020-10-05

## 2020-10-05 DIAGNOSIS — I48.0 PAROXYSMAL ATRIAL FIBRILLATION (HCC): ICD-10-CM

## 2020-10-05 RX ORDER — FLECAINIDE ACETATE 50 MG/1
TABLET ORAL
Qty: 180 TABLET | Refills: 3 | Status: SHIPPED | OUTPATIENT
Start: 2020-10-05 | End: 2021-08-25

## 2020-10-09 DIAGNOSIS — I48.0 PAROXYSMAL ATRIAL FIBRILLATION (HCC): ICD-10-CM

## 2020-10-10 RX ORDER — RIVAROXABAN 20 MG/1
TABLET, FILM COATED ORAL
Qty: 90 TABLET | Refills: 3 | Status: SHIPPED | OUTPATIENT
Start: 2020-10-10 | End: 2021-09-06

## 2020-10-12 ENCOUNTER — LAB (OUTPATIENT)
Dept: LAB | Facility: CLINIC | Age: 80
End: 2020-10-12
Payer: COMMERCIAL

## 2020-10-12 DIAGNOSIS — I10 BENIGN HYPERTENSION: ICD-10-CM

## 2020-10-12 DIAGNOSIS — E66.01 MORBID OBESITY (HCC): ICD-10-CM

## 2020-10-12 DIAGNOSIS — R60.0 BILATERAL EDEMA OF LOWER EXTREMITY: ICD-10-CM

## 2020-10-12 DIAGNOSIS — G47.33 OSA (OBSTRUCTIVE SLEEP APNEA): ICD-10-CM

## 2020-10-12 DIAGNOSIS — I48.0 PAROXYSMAL ATRIAL FIBRILLATION (HCC): ICD-10-CM

## 2020-10-12 DIAGNOSIS — Z11.59 SCREENING FOR VIRAL DISEASE: ICD-10-CM

## 2020-10-12 DIAGNOSIS — E78.2 MIXED HYPERLIPIDEMIA: ICD-10-CM

## 2020-10-12 LAB
ALBUMIN SERPL BCP-MCNC: 3.7 G/DL (ref 3.5–5)
ALP SERPL-CCNC: 83 U/L (ref 46–116)
ALT SERPL W P-5'-P-CCNC: 23 U/L (ref 12–78)
ANION GAP SERPL CALCULATED.3IONS-SCNC: 5 MMOL/L (ref 4–13)
AST SERPL W P-5'-P-CCNC: 17 U/L (ref 5–45)
BASOPHILS # BLD AUTO: 0.06 THOUSANDS/ΜL (ref 0–0.1)
BASOPHILS NFR BLD AUTO: 1 % (ref 0–1)
BILIRUB SERPL-MCNC: 0.55 MG/DL (ref 0.2–1)
BUN SERPL-MCNC: 26 MG/DL (ref 5–25)
CALCIUM SERPL-MCNC: 10 MG/DL (ref 8.3–10.1)
CHLORIDE SERPL-SCNC: 107 MMOL/L (ref 100–108)
CHOLEST SERPL-MCNC: 184 MG/DL (ref 50–200)
CO2 SERPL-SCNC: 28 MMOL/L (ref 21–32)
CREAT SERPL-MCNC: 1.25 MG/DL (ref 0.6–1.3)
EOSINOPHIL # BLD AUTO: 0.23 THOUSAND/ΜL (ref 0–0.61)
EOSINOPHIL NFR BLD AUTO: 3 % (ref 0–6)
ERYTHROCYTE [DISTWIDTH] IN BLOOD BY AUTOMATED COUNT: 13.4 % (ref 11.6–15.1)
GFR SERPL CREATININE-BSD FRML MDRD: 41 ML/MIN/1.73SQ M
GLUCOSE P FAST SERPL-MCNC: 105 MG/DL (ref 65–99)
HCT VFR BLD AUTO: 42.9 % (ref 34.8–46.1)
HDLC SERPL-MCNC: 105 MG/DL
HGB BLD-MCNC: 13.1 G/DL (ref 11.5–15.4)
IMM GRANULOCYTES # BLD AUTO: 0.03 THOUSAND/UL (ref 0–0.2)
IMM GRANULOCYTES NFR BLD AUTO: 0 % (ref 0–2)
LDLC SERPL CALC-MCNC: 61 MG/DL (ref 0–100)
LYMPHOCYTES # BLD AUTO: 3.07 THOUSANDS/ΜL (ref 0.6–4.47)
LYMPHOCYTES NFR BLD AUTO: 35 % (ref 14–44)
MCH RBC QN AUTO: 29.2 PG (ref 26.8–34.3)
MCHC RBC AUTO-ENTMCNC: 30.5 G/DL (ref 31.4–37.4)
MCV RBC AUTO: 96 FL (ref 82–98)
MONOCYTES # BLD AUTO: 0.55 THOUSAND/ΜL (ref 0.17–1.22)
MONOCYTES NFR BLD AUTO: 6 % (ref 4–12)
NEUTROPHILS # BLD AUTO: 4.93 THOUSANDS/ΜL (ref 1.85–7.62)
NEUTS SEG NFR BLD AUTO: 55 % (ref 43–75)
NONHDLC SERPL-MCNC: 79 MG/DL
NRBC BLD AUTO-RTO: 0 /100 WBCS
PLATELET # BLD AUTO: 291 THOUSANDS/UL (ref 149–390)
PMV BLD AUTO: 10.3 FL (ref 8.9–12.7)
POTASSIUM SERPL-SCNC: 4.2 MMOL/L (ref 3.5–5.3)
PROT SERPL-MCNC: 7.2 G/DL (ref 6.4–8.2)
RBC # BLD AUTO: 4.49 MILLION/UL (ref 3.81–5.12)
SODIUM SERPL-SCNC: 140 MMOL/L (ref 136–145)
TRIGL SERPL-MCNC: 89 MG/DL
TSH SERPL DL<=0.05 MIU/L-ACNC: 2.68 UIU/ML (ref 0.36–3.74)
WBC # BLD AUTO: 8.87 THOUSAND/UL (ref 4.31–10.16)

## 2020-10-12 PROCEDURE — 84443 ASSAY THYROID STIM HORMONE: CPT

## 2020-10-12 PROCEDURE — 80053 COMPREHEN METABOLIC PANEL: CPT

## 2020-10-12 PROCEDURE — 36415 COLL VENOUS BLD VENIPUNCTURE: CPT

## 2020-10-12 PROCEDURE — 80061 LIPID PANEL: CPT

## 2020-10-12 PROCEDURE — 85025 COMPLETE CBC W/AUTO DIFF WBC: CPT

## 2020-10-13 ENCOUNTER — TELEPHONE (OUTPATIENT)
Dept: CARDIOLOGY CLINIC | Facility: CLINIC | Age: 80
End: 2020-10-13

## 2020-10-20 ENCOUNTER — OFFICE VISIT (OUTPATIENT)
Dept: CARDIOLOGY CLINIC | Facility: CLINIC | Age: 80
End: 2020-10-20
Payer: COMMERCIAL

## 2020-10-20 VITALS
HEIGHT: 68 IN | BODY MASS INDEX: 40.92 KG/M2 | HEART RATE: 78 BPM | OXYGEN SATURATION: 94 % | DIASTOLIC BLOOD PRESSURE: 78 MMHG | WEIGHT: 270 LBS | TEMPERATURE: 97.8 F | SYSTOLIC BLOOD PRESSURE: 126 MMHG

## 2020-10-20 DIAGNOSIS — I48.0 PAROXYSMAL ATRIAL FIBRILLATION (HCC): ICD-10-CM

## 2020-10-20 DIAGNOSIS — R60.0 BILATERAL EDEMA OF LOWER EXTREMITY: ICD-10-CM

## 2020-10-20 DIAGNOSIS — D32.9 MENINGIOMA (HCC): ICD-10-CM

## 2020-10-20 DIAGNOSIS — E78.2 MIXED HYPERLIPIDEMIA: ICD-10-CM

## 2020-10-20 DIAGNOSIS — G47.33 OSA (OBSTRUCTIVE SLEEP APNEA): ICD-10-CM

## 2020-10-20 DIAGNOSIS — I10 BENIGN HYPERTENSION: ICD-10-CM

## 2020-10-20 DIAGNOSIS — E66.01 MORBID OBESITY (HCC): ICD-10-CM

## 2020-10-20 PROCEDURE — 93000 ELECTROCARDIOGRAM COMPLETE: CPT | Performed by: INTERNAL MEDICINE

## 2020-10-20 PROCEDURE — 99214 OFFICE O/P EST MOD 30 MIN: CPT | Performed by: INTERNAL MEDICINE

## 2020-11-12 DIAGNOSIS — R60.0 BILATERAL EDEMA OF LOWER EXTREMITY: ICD-10-CM

## 2020-11-12 DIAGNOSIS — I10 BENIGN HYPERTENSION: ICD-10-CM

## 2020-11-12 RX ORDER — TRIAMTERENE AND HYDROCHLOROTHIAZIDE 37.5; 25 MG/1; MG/1
1 TABLET ORAL DAILY
Qty: 90 TABLET | Refills: 3 | Status: SHIPPED | OUTPATIENT
Start: 2020-11-12 | End: 2021-08-25

## 2020-12-16 DIAGNOSIS — I10 BENIGN HYPERTENSION: ICD-10-CM

## 2020-12-16 RX ORDER — IRBESARTAN 300 MG/1
300 TABLET ORAL DAILY
Qty: 90 TABLET | Refills: 3 | Status: SHIPPED | OUTPATIENT
Start: 2020-12-16 | End: 2021-10-22

## 2021-01-04 ENCOUNTER — TELEMEDICINE (OUTPATIENT)
Dept: NEUROLOGY | Facility: CLINIC | Age: 81
End: 2021-01-04
Payer: COMMERCIAL

## 2021-01-04 VITALS — HEIGHT: 68 IN | BODY MASS INDEX: 41.66 KG/M2

## 2021-01-04 DIAGNOSIS — D32.9 MENINGIOMA (HCC): Primary | ICD-10-CM

## 2021-01-04 DIAGNOSIS — Q28.3 DEVELOPMENTAL VENOUS ANOMALY: ICD-10-CM

## 2021-01-04 PROCEDURE — 99214 OFFICE O/P EST MOD 30 MIN: CPT | Performed by: PSYCHIATRY & NEUROLOGY

## 2021-01-04 RX ORDER — CYCLOBENZAPRINE HCL 5 MG
5 TABLET ORAL EVERY 8 HOURS PRN
COMMUNITY
Start: 2020-12-21 | End: 2021-06-28

## 2021-01-04 NOTE — PROGRESS NOTES
Virtual Regular Visit      Assessment/Plan:    Problem List Items Addressed This Visit        Nervous and Auditory    Meningioma ([facility] [address] ) - Primary      Other Visit Diagnoses     Developmental venous anomaly            Patient is doing well  Since 2016, there has been no change to her meningioma  Will repeat in 2-3 years  Discussed going to nearest ED if she develops any stroke or seizure like activity  Reason for visit is No chief complaint on file  Encounter provider Foster Vela MD    Provider located at [address] [place] [id number] [phone]      Recent Visits  No visits were found meeting these conditions  Showing recent visits within past 7 days and meeting all other requirements     Today's Visits  Date Type Provider Dept   01/04/21 Telemedicine Foster Vela MD [facility]   Showing today's visits and meeting all other requirements     Future Appointments  No visits were found meeting these conditions  Showing future appointments within next 150 days and meeting all other requirements        The patient was identified by name and date of birth  Audi Barrera was informed that this is a telemedicine visit and that the visit is being conducted through [address] [place] [address] and patient was informed that this is not a secure, HIPAA-compliant platform  She agrees to proceed     My office door was closed  No one else was in the room  She acknowledged consent and understanding of privacy and security of the video platform  The patient has agreed to participate and understands they can discontinue the visit at any time  Patient is aware this is a billable service  Subjective  Audi Barrera is a [de-identified] y o  female with PMH of meningioma is being followed up   Patient was found to have 7mm meningioma in right parietal lobe adjacent to sinus and cortical vein along with small developmental venous angioma in right temporal/temporoparietal region  She had surveillance imaging and it was stable  There has not been any stroke or seizure like activity  She's on xarelto for A fib  Past Medical History:   Diagnosis Date    [facility]     [facility]     Hyperlipidemia     Hypertension     UTI (urinary tract infection)        Past Surgical History:   Procedure Laterality Date    HYSTERECTOMY      JOINT REPLACEMENT         Current Outpatient Medications   Medication Sig Dispense Refill    atorvastatin (LIPITOR) 40 mg tablet Take 40 mg by mouth daily      b complex vitamins tablet Take 1 tablet by mouth daily      Cholecalciferol (VITAMIN D-3) 1000 units CAPS Take by mouth      cyclobenzaprine (FLEXERIL) 5 mg tablet Take 5 mg by mouth every 8 (eight) hours as needed      flecainide (TAMBOCOR) 50 mg tablet TAKE 1 TABLET BY MOUTH  TWICE DAILY 180 tablet 3    irbesartan (AVAPRO) 300 mg tablet Take 1 tablet (300 mg total) by mouth daily 90 tablet 3    metoprolol succinate (TOPROL-XL) 25 mg 24 hr tablet TAKE 1 TABLET BY MOUTH  DAILY 90 tablet 3    Multiple Vitamins-Minerals (CENTRUM ADULTS PO) Take by mouth      triamterene-hydrochlorothiazide (MAXZIDE-25) 37 5-25 mg per tablet Take 1 tablet by mouth daily 90 tablet 3    Xarelto 20 MG tablet TAKE 1 TABLET BY MOUTH  DAILY WITH DINNER 90 tablet 3     No current facility-administered medications for this visit  Allergies   Allergen Reactions    Macrodantin [Nitrofurantoin Macrocrystal] Vomiting    Nitrofurantoin     Oxycodone-Acetaminophen Vomiting    Sulfa Antibiotics        Review of Systems   All other systems reviewed and are negative  Video Exam    Vitals:    01/04/21 1104   Height: 5' 7 5" (1 715 m)       Physical Exam  Constitutional:       Appearance: Normal appearance  HENT:      Head: Normocephalic  Neurological:      General: No focal deficit present  Mental Status: She is alert and oriented to person, place, and time  Mental status is at baseline  I spent 25 minutes with patient today in which greater than 50% of the time was spent in counseling/coordination of care regarding her condition  VIRTUAL VISIT DISCLAIMER    Tano Reid acknowledges that she has consented to an online visit or consultation  She understands that the online visit is based solely on information provided by her, and that, in the absence of a face-to-face physical evaluation by the physician, the diagnosis she receives is both limited and provisional in terms of accuracy and completeness  This is not intended to replace a full medical face-to-face evaluation by the physician  Tano Reid understands and accepts these terms

## 2021-04-13 ENCOUNTER — TELEPHONE (OUTPATIENT)
Dept: CARDIOLOGY CLINIC | Facility: CLINIC | Age: 81
End: 2021-04-13

## 2021-04-13 NOTE — TELEPHONE ENCOUNTER
Patient called to ask approval for [employer] dietary guideline? I sent a link to [employer] - in order to attach pdf for dr barbara cortez

## 2021-05-18 ENCOUNTER — OFFICE VISIT (OUTPATIENT)
Dept: CARDIOLOGY CLINIC | Facility: CLINIC | Age: 81
End: 2021-05-18
Payer: COMMERCIAL

## 2021-05-18 VITALS
HEART RATE: 69 BPM | OXYGEN SATURATION: 98 % | BODY MASS INDEX: 41.68 KG/M2 | TEMPERATURE: 98.4 F | DIASTOLIC BLOOD PRESSURE: 66 MMHG | WEIGHT: 275 LBS | SYSTOLIC BLOOD PRESSURE: 120 MMHG | HEIGHT: 68 IN

## 2021-05-18 DIAGNOSIS — E78.2 MIXED HYPERLIPIDEMIA: ICD-10-CM

## 2021-05-18 DIAGNOSIS — E66.9 CLASS 3 OBESITY: ICD-10-CM

## 2021-05-18 DIAGNOSIS — D32.9 MENINGIOMA (HCC): ICD-10-CM

## 2021-05-18 DIAGNOSIS — R60.0 BILATERAL EDEMA OF LOWER EXTREMITY: ICD-10-CM

## 2021-05-18 DIAGNOSIS — G47.33 OSA (OBSTRUCTIVE SLEEP APNEA): ICD-10-CM

## 2021-05-18 DIAGNOSIS — I10 BENIGN HYPERTENSION: ICD-10-CM

## 2021-05-18 DIAGNOSIS — I48.0 PAROXYSMAL ATRIAL FIBRILLATION (HCC): ICD-10-CM

## 2021-05-18 PROCEDURE — 93000 ELECTROCARDIOGRAM COMPLETE: CPT | Performed by: INTERNAL MEDICINE

## 2021-05-18 PROCEDURE — 99214 OFFICE O/P EST MOD 30 MIN: CPT | Performed by: INTERNAL MEDICINE

## 2021-05-18 NOTE — PROGRESS NOTES
Progress Note - Cardiology Office  [facility]    Jazmyne Alfredo [de-identified] y o  female MRN: [id number]  : 1940  Encounter: [id number]      Assessment:     1  Paroxysmal atrial fibrillation (HCC)    2  Benign hypertension    3  RAJWINDER (obstructive sleep apnea)    4  Mixed hyperlipidemia    5  Meningioma (HCC)    6  Class 3 obesity with BMI around 42    7  Bilateral edema of lower extremity        Discussion Summary and Plan:  1  Paroxysmal atrial fibrillation  Patient is staying in sinus rhythm  She has no reoccurrence  Continue Xarelto and continue flecainide  She is also on low-dose metoprolol  Heart rate is 69 beats per minute  No issues with bleeding  Her Jennifer Vasc score is high  2  Essential hypertension  Patient has last blood test on 2020  Creatinine was 1 25 potassium was 4 2  Electrolytes were acceptable  Will check BMP  Currently she is taking metoprolol XL 25 mg, Avapro 300 mg daily, Maxzide 1 tablet daily        3  Dyslipidemia  Continue statins  Is taking Lipitor 40 mg daily cholesterol profile is acceptable      4  Obesity with BMI around  Forty-two  Encouraged her to lose weight  She is motivated  5  Abnormal sleep screen  Patient has abnormal sleep study which was home sleep study  She need to get full  Sleep study and follow up with Pulmonary  Insurance has denied sleep study in the past   She will be recommended to go follow up with Pulmonary      6  Bilateral lower extremity edema  Much better  Will check electrolytes  7  History of meningioma    Continue current Rx  Follow-up in 6 months she is tolerating her Xarelto, metoprolol XL and flecainide very well  Please call [phone] if any questions  Counseling :  A description of the counseling  Goals and Barriers  Patient's ability to self care: Yes  Medication side effect reviewed with patient in detail and all their questions answered to their satisfaction      HPI : Shaw Lawrence is a [de-identified]y o  year old female who came for follow up  Patient was recently admitted to [facility] with palpitations and rapid heartbeat and was found to be in AFib with rapid ventricular rate  She has a past medical history significant for essential hypertension, obesity with BMI around 39, dyslipidemia, previous history of atrial fibrillation but not on antithrombotic therapy as she was getting brief episodes, who noted she went into AFib earlier that morning and was sent to the emergency room  Patient spontaneously converted back to sinus rhythm and was started on antithrombotic therapy and added low-dose metoprolol  Currently she came for follow-up  She did have some rash she is not sure it related to Toprol XL or Xarelto but it has slowly improved     At this time she denies any chest pain any shortness of breath  She has some chronic shortness of breath which is not changed  No nausea no vomiting no PND no orthopnea no palpitations at all  She does monitor heart rate regularly at generally is around 70 beats per minute  10/20/2020  Above reviewed  Patient came for follow-up  She is doing well she had a medical history significant for paroxysmal atrial fibrillation currently in sinus heart rate 78 beats per minute, essential hypertension, dyslipidemia, history of knee replacement surgery who came for follow-up  She does have history of recurrent UTI currently doing well  She has previously been in atrial fibrillation but she spontaneously converts back to sinus rhythm  She was started on flecainide  She had a blood test done 10/12/2020 which were acceptable cholesterol profile was acceptable  Vitals has been stable  No nausea no vomiting no fever no chills no PND no orthopnea no other issues at this time  05/18/2021  Above reviewed  Patient came for follow-up she is doing well from cardiac point of view    She had history of paroxysmal atrial fibrillation currently in sinus on low-dose flecainide, essential hypertension, dyslipidemia, history of knee replacement surgery, history of abnormal sleep screen with possible sleep apnea but never had  Separate study done came for follow-up  She denies any chest pain any shortness of breath  She feels more fatigue and tired particularly in the daytime and she feels sleepy  No nausea no vomiting no other issues at this time  Review of Systems   Constitutional: Positive for fatigue  Negative for activity change, chills, diaphoresis, fever and unexpected weight change  Sleepy during daytime   HENT: Negative for congestion  Eyes: Negative for discharge and redness  Respiratory: Negative for cough, chest tightness, shortness of breath and wheezing  Cardiovascular: Negative  Negative for chest pain, palpitations and leg swelling  Gastrointestinal: Negative for abdominal pain, diarrhea and nausea  Endocrine: Negative  Genitourinary: Negative for decreased urine volume and urgency  Musculoskeletal: Positive for arthralgias and back pain  Negative for gait problem  Skin: Negative for rash and wound  Allergic/Immunologic: Negative  Neurological: Negative for dizziness, seizures, syncope, weakness, light-headedness and headaches  Hematological: Negative  Psychiatric/Behavioral: Positive for sleep disturbance  Negative for agitation and confusion  The patient is nervous/anxious          Historical Information   Past Medical History:   Diagnosis Date    [facility]fib ([facility] )     Cancer ([facility] )     Hyperlipidemia     Hypertension     UTI (urinary tract infection)      Past Surgical History:   Procedure Laterality Date    HYSTERECTOMY      JOINT REPLACEMENT       Social History     Substance and Sexual Activity   Alcohol Use Not Currently    Frequency: Never    Comment: rarely     Social History     Substance and Sexual Activity   Drug Use No     Social History     Tobacco Use   Smoking Status Never Smoker   Smokeless Tobacco Never Used     Family History: History reviewed  No pertinent family history  Meds/Allergies     Allergies   Allergen Reactions    Macrodantin [Nitrofurantoin Macrocrystal] Vomiting    Nitrofurantoin     Oxycodone-Acetaminophen Vomiting    Sulfa Antibiotics        Current Outpatient Medications:     atorvastatin (LIPITOR) 40 mg tablet, Take 40 mg by mouth daily, Disp: , Rfl:     b complex vitamins tablet, Take 1 tablet by mouth daily, Disp: , Rfl:     Cholecalciferol (VITAMIN D-3) 1000 units CAPS, Take by mouth, Disp: , Rfl:     cyclobenzaprine (FLEXERIL) 5 mg tablet, Take 5 mg by mouth every 8 (eight) hours as needed, Disp: , Rfl:     flecainide (TAMBOCOR) 50 mg tablet, TAKE 1 TABLET BY MOUTH  TWICE DAILY, Disp: 180 tablet, Rfl: 3    irbesartan (AVAPRO) 300 mg tablet, Take 1 tablet (300 mg total) by mouth daily, Disp: 90 tablet, Rfl: 3    metoprolol succinate (TOPROL-XL) 25 mg 24 hr tablet, TAKE 1 TABLET BY MOUTH  DAILY, Disp: 90 tablet, Rfl: 3    Multiple Vitamins-Minerals (CENTRUM ADULTS PO), Take by mouth, Disp: , Rfl:     triamterene-hydrochlorothiazide (MAXZIDE-25) 37 5-25 mg per tablet, Take 1 tablet by mouth daily, Disp: 90 tablet, Rfl: 3    Xarelto 20 MG tablet, TAKE 1 TABLET BY MOUTH  DAILY WITH DINNER, Disp: 90 tablet, Rfl: 3    Vitals: Blood pressure 120/66, pulse 69, temperature 98 4 °F (36 9 °C), height 5' 7 5" (1 715 m), weight 125 kg (275 lb), SpO2 98 %  Body mass index is 42 44 kg/m²  Vitals:    05/18/21 1538   Weight: 125 kg (275 lb)     BP Readings from Last 3 Encounters:   05/18/21 120/66   10/20/20 126/78   06/16/20 130/74         Physical Exam  Constitutional:       General: She is not in acute distress  Appearance: She is well-developed  She is not diaphoretic  HENT:      Head: Normocephalic and atraumatic  Eyes:      Pupils: Pupils are equal, round, and reactive to light  Neck:      Musculoskeletal: Neck supple  Thyroid: No thyromegaly  Vascular: No JVD  Trachea: No tracheal deviation  Cardiovascular:      Rate and Rhythm: Normal rate and regular rhythm  Heart sounds: S1 normal and S2 normal  Heart sounds not distant  Murmur present  Systolic (ejection) murmur present with a grade of 2/6  No friction rub  No gallop  No S3 or S4 sounds  Pulmonary:      Effort: Pulmonary effort is normal  No respiratory distress  Breath sounds: Normal breath sounds  No wheezing or rales  Chest:      Chest wall: No tenderness  Abdominal:      General: Bowel sounds are normal  There is no distension  Palpations: Abdomen is soft  Tenderness: There is no abdominal tenderness  Musculoskeletal:         General: No deformity  Skin:     General: Skin is warm and dry  Coloration: Skin is not pale  Findings: No rash  Neurological:      Mental Status: She is alert and oriented to person, place, and time  Psychiatric:         Behavior: Behavior normal          Judgment: Judgment normal          Diagnostic Studies Review Cardio:    Echo Doppler  Echo Doppler done 08/23/2019 shows EF 60%, left atrium size was normal, right atrium mildly dilated, no significant valvular disease  Nuclear stress test   Nuclear stress test done 10/09/2019 was probably normal   Exercised for 5 minutes and 51 seconds  EF was 75%  No perfusion abnormality was noted  There was imaged artifact noted  EKG:  Twelve lead EKG done 09/25/2019 shows normal sinus rhythm heart rate 70 beats per minute  Twelve lead EKG done today 10/16/2019 shows normal sinus rhythm heart rate 74 beats per minute  Twelve lead EKG 12/06/2019 shows normal sinus rhythm heart rate 86 beats per minute  She is staying in sinus rhythm normal intervals  Twelve lead EKG done 06/09/2020 shows atrial flutter with heart rate 94 beats per minute  Nonspecific ST changes  As compared to previous EKG patient is now in atrial flutter      Twelve lead EKG done 10/28/2020 shows sinus rhythm first-degree AV block heart rate 78 beats per minute  As compared to previous EKG patient is in sinus rhythm  Twelve lead EKG 2021 shows sinus rhythm first-degree AV block heart rate 69 beats per minute  Cardiac testing:   Results for orders placed during the hospital encounter of 19   Echo complete with contrast if indicated    Narrative [address]  [address] [facility]  [place]  [phone]    Transthoracic Echocardiogram  2D, M-mode, Doppler, and Color Doppler    Study date:  23-Aug-2019    Patient: [facility]  MR number: [id number]  Account number: [de-identified]  : 1940  Age: 78 years  Gender: Female  Status: Outpatient  Location: Bedside  Height: 67 in  Weight: 250 6 lb  BP: 152/ 72 mmHg    Indications: A Fib  Diagnoses: I48 0 - Atrial fibrillation    Sonographer:  CHENG Lozano  Primary Physician:  Malissa Pham MD  Referring Physician:  Pia Brown MD  Group:  [facility]  Interpreting Physician:  Pia Brown MD    SUMMARY    LEFT VENTRICLE:  Systolic function was normal  Ejection fraction was estimated in the range of 55 % to 60 % to be 60 %  There were no regional wall motion abnormalities  Wall thickness was mildly increased  There was mild concentric hypertrophy  RIGHT ATRIUM:  The atrium was mildly dilated  MITRAL VALVE:  There was trace regurgitation  TRICUSPID VALVE:  There was mild regurgitation  Estimated peak PA pressure was 30 mmHg  HISTORY: PRIOR HISTORY: HTN,Hyperlipidemia,A Fib ,Cancer  PROCEDURE: The procedure was performed at the bedside  This was a routine study  The transthoracic approach was used  The study included complete 2D imaging, M-mode, complete spectral Doppler, and color Doppler  The heart rate was 69 bpm,  at the start of the study   Images were obtained from the parasternal, apical, subcostal, and suprasternal notch acoustic windows  Image quality was adequate  LEFT VENTRICLE: Size was normal  Systolic function was normal  Ejection fraction was estimated in the range of 55 % to 60 % to be 60 %  There were no regional wall motion abnormalities  Wall thickness was mildly increased  There was mild  concentric hypertrophy  DOPPLER: Left ventricular diastolic function parameters were normal for the patient's age  RIGHT VENTRICLE: The size was normal  Systolic function was normal     LEFT ATRIUM: Size was normal     RIGHT ATRIUM: The atrium was mildly dilated  MITRAL VALVE: There was normal leaflet separation  DOPPLER: The transmitral velocity was within the normal range  There was no evidence for stenosis  There was trace regurgitation  AORTIC VALVE: The valve was trileaflet  Leaflets exhibited mildly increased thickness and normal cuspal separation  DOPPLER: Transaortic velocity was within the normal range  There was no evidence for stenosis  There was no regurgitation  TRICUSPID VALVE: The valve structure was normal  There was normal leaflet separation  DOPPLER: The transtricuspid velocity was within the normal range  There was no evidence for stenosis  There was mild regurgitation  Estimated peak PA  pressure was 30 mmHg  PULMONIC VALVE: DOPPLER: There was no significant regurgitation  PERICARDIUM: There was no thickening or calcification  There was no pericardial effusion  AORTA: The root exhibited normal size  SYSTEMIC VEINS: IVC: The inferior vena cava was normal in size   Respirophasic changes were normal     SYSTEM MEASUREMENT TABLES    2D mode  AoR Diam 2D: 3 3 cm  LA Diam (2D): 3 9 cm  LA/Ao (2D): 1 18  FS (2D Teich): 28 2 %  IVSd (2D): 1 21 cm  LVDEV: 78 1 cmï¾³  LVESV: 35 3 cmï¾³  LVIDd(2D): 4 19 cm  LVISd (2D): 3 01 cm  LVOT Area 2D: 3 14 cmï¾²  LVPWd (2D): 1 2 cm  SV (Teich): 42 8 cmï¾³    Apical four chamber  LVEF A4C: 57 %    Unspecified Scan Mode  REYNALDO Cont Eq (Peak Chemo): 2 58 cmï¾²  LVOT Diam : 2 cm  LVOT Vmax: 1290 mm/s  LVOT Vmax; Mean: 1290 mm/s  Peak Grad ; Mean: 7 mm[Hg]  MV Peak A Chemo: 622 mm/s  MV Peak E Chemo  Mean: 795 mm/s  MVA (PHT): 4 4 cmï¾²  PHT: 50 ms  Max P mm[Hg]  V Max: 2530 mm/s  Vmax: 2250 mm/s  RA Area: 19 5 cmï¾²  RA Volume: 59 7 cmï¾³  TAPSE: 2 1 cm    [employer] Accredited Echocardi[facility]    Prepared and electronically signed by    Julia Rojo MD  Signed 23-Aug-2019 16:20:25       Imaging:  Chest X-Ray:   No Chest XR results available for this patient  CT-scan of the chest:     No CTA results available for this patient  Lab Review   Lab Results   Component Value Date    WBC 8 87 10/12/2020    HGB 13 1 10/12/2020    HCT 42 9 10/12/2020    MCV 96 10/12/2020    RDW 13 4 10/12/2020     10/12/2020     BMP:  Lab Results   Component Value Date    SODIUM 140 10/12/2020    K 4 2 10/12/2020     10/12/2020    CO2 28 10/12/2020    BUN 26 (H) 10/12/2020    CREATININE 1 25 10/12/2020    GLUC 108 2019    GLUF 105 (H) 10/12/2020    CALCIUM 10 0 10/12/2020    EGFR 41 10/12/2020    MG 2 2 2016     LFT:  Lab Results   Component Value Date    AST 17 10/12/2020    ALT 23 10/12/2020    ALKPHOS 83 10/12/2020    TP 7 2 10/12/2020    ALB 3 7 10/12/2020      Lab Results   Component Value Date    EXT8UFLDWGDW 2 680 10/12/2020     No results found for: HGBA1C  Lipid Profile:   Lab Results   Component Value Date    CHOLESTEROL 184 10/12/2020     10/12/2020    LDLCALC 61 10/12/2020    TRIG 89 10/12/2020     Lab Results   Component Value Date    CHOLESTEROL 184 10/12/2020    CHOLESTEROL 193 05/10/2019     Lab Results   Component Value Date    CKTOTAL 69 2018    TROPONINI <0 02 2019     Lab Results   Component Value Date    NTBNP 143 2018            Dr Julia Rojo MD [facility]      "This note has been constructed using a voice recognition system  Therefore there may be syntax, spelling, and/or grammatical errors   Please call if you have any questions  "

## 2021-05-24 DIAGNOSIS — E78.2 MIXED HYPERLIPIDEMIA: Primary | ICD-10-CM

## 2021-05-24 RX ORDER — ATORVASTATIN CALCIUM 40 MG/1
40 TABLET, FILM COATED ORAL DAILY
Qty: 90 TABLET | Refills: 3 | Status: SHIPPED | OUTPATIENT
Start: 2021-05-24 | End: 2022-03-14

## 2021-06-12 DIAGNOSIS — I48.0 PAROXYSMAL ATRIAL FIBRILLATION (HCC): ICD-10-CM

## 2021-06-14 RX ORDER — METOPROLOL SUCCINATE 25 MG/1
TABLET, EXTENDED RELEASE ORAL
Qty: 90 TABLET | Refills: 3 | Status: SHIPPED | OUTPATIENT
Start: 2021-06-14 | End: 2022-05-19

## 2021-06-15 ENCOUNTER — TELEPHONE (OUTPATIENT)
Dept: FAMILY MEDICINE CLINIC | Facility: CLINIC | Age: 81
End: 2021-06-15

## 2021-06-15 NOTE — TELEPHONE ENCOUNTER
Luis Hernández has an apt 06/28 with dr Reyna Thao  She is calling because she would like a shingles vaccine that day  Is this something we can do at that time? She was also asking if her  can also have a shingles vaccine same day?   Please call patient back if this is ok    Thank You

## 2021-06-15 NOTE — TELEPHONE ENCOUNTER
Spoke with pt, I advised her that medicare does not cover the Shingrix vaccine in office she will need to go to a pharmacy to have this done  Pt states he does not have straight medicare it is [employer] medicare PPO  I did advise her to call her insurance  company to see if they will approve the vaccine in office  Pt will call back if they do to schedule this vaccine  No further action needed   Cloteal Call, LPN

## 2021-06-21 NOTE — PROGRESS NOTES
Assessment/Plan:    1  Medicare annual wellness visit, subsequent    2  Benign hypertension  Assessment & Plan:  Hypertension is well controlled and she will continue current medications  We discussed need for exercise and weight loss  Orders:  -     CBC and differential; Future  -     Comprehensive metabolic panel; Future  -     Lipid panel; Future    3  Paroxysmal atrial fibrillation [facility])  Assessment & Plan: This is managed by cardiology and is rate controlled  Continue xarelto as ordered  4  Mixed hyperlipidemia  Assessment & Plan:  Continue atorvastatin at current dose and will check labs for lipids and LFT's  Advised again on need for exercise and weight loss  5  Morbid obesity ([address] )  Assessment & Plan:  Recommend weight loss as above  6  Elevated glucose  -     HEMOGLOBIN A1C W/ EAG ESTIMATION; Future    7  Urinary frequency  Comments: Will check glucose levels due to elevated glucose in the past as well as polyuria  8  BMI 40 0-44 9, adult ([facility])        BMI Counseling: Body mass index is 40 74 kg/m²  The BMI is above normal  Nutrition recommendations include reducing portion sizes and decreasing overall calorie intake  Exercise recommendations include moderate aerobic physical activity for 150 minutes/week  Patient Instructions       Medicare Preventive Visit Patient Instructions  Thank you for completing your Welcome to Medicare Visit or Medicare Annual Wellness Visit today  Your next wellness visit will be due in one year (6/29/2022)  The screening/preventive services that you may require over the next 5-10 years are detailed below  Some tests may not apply to you based off risk factors and/or age  Screening tests ordered at today's visit but not completed yet may show as past due  Also, please note that scanned in results may not display below    Preventive Screenings:  Service Recommendations Previous Testing/Comments   Colorectal Cancer Screening  * Colonoscopy    * Fecal Occult Blood Test (FOBT)/Fecal Immunochemical Test (FIT)  * Fecal DNA/Cologuard Test  * Flexible Sigmoidoscopy Age: 54-65 years old   Colonoscopy: every 10 years (may be performed more frequently if at higher risk)  OR  FOBT/FIT: every 1 year  OR  Cologuard: every 3 years  OR  Sigmoidoscopy: every 5 years  Screening may be recommended earlier than age 48 if at higher risk for colorectal cancer  Also, an individualized decision between you and your healthcare provider will decide whether screening between the ages of 74-80 would be appropriate  Colonoscopy: Not on file  FOBT/FIT: Not on file  Cologuard: Not on file  Sigmoidoscopy: Not on file          Breast Cancer Screening Age: 36 years old  Frequency: every 1-2 years  Not required if history of left and right mastectomy Mammogram: Not on file        Cervical Cancer Screening Between the ages of 21-29, pap smear recommended once every 3 years  Between the ages of 33-67, can perform pap smear with HPV co-testing every 5 years  Recommendations may differ for women with a history of total hysterectomy, cervical cancer, or abnormal pap smears in past  Pap Smear: Not on file    Screening Not Indicated   Hepatitis C Screening Once for adults born between 1945 and 1965  More frequently in patients at high risk for Hepatitis C Hep C Antibody: Not on file        Diabetes Screening 1-2 times per year if you're at risk for diabetes or have pre-diabetes Fasting glucose: 105 mg/dL   A1C: No results in last 5 years    Screening Current   Cholesterol Screening Once every 5 years if you don't have a lipid disorder  May order more often based on risk factors  Lipid panel: 10/12/2020    Screening Not Indicated  History Lipid Disorder     Other Preventive Screenings Covered by Medicare:  1  Abdominal Aortic Aneurysm (AAA) Screening: covered once if your at risk  You're considered to be at risk if you have a family history of AAA    2  Lung Cancer Screening: covers low dose CT scan once per year if you meet all of the following conditions: (1) Age 50-69; (2) No signs or symptoms of lung cancer; (3) Current smoker or have quit smoking within the last 15 years; (4) You have a tobacco smoking history of at least 30 pack years (packs per day multiplied by number of years you smoked); (5) You get a written order from a healthcare provider  3  Glaucoma Screening: covered annually if you're considered high risk: (1) You have diabetes OR (2) Family history of glaucoma OR (3)  aged 48 and older OR (3)  American aged 72 and older  3  Osteoporosis Screening: covered every 2 years if you meet one of the following conditions: (1) You're estrogen deficient and at risk for osteoporosis based off medical history and other findings; (2) Have a vertebral abnormality; (3) On glucocorticoid therapy for more than 3 months; (4) Have primary hyperparathyroidism; (5) On osteoporosis medications and need to assess response to drug therapy  · Last bone density test (DXA Scan): Not on file  5  HIV Screening: covered annually if you're between the age of 12-76  Also covered annually if you are younger than 13 and older than 72 with risk factors for HIV infection  For pregnant patients, it is covered up to 3 times per pregnancy  Immunizations:  Immunization Recommendations   Influenza Vaccine Annual influenza vaccination during flu season is recommended for all persons aged >= 6 months who do not have contraindications   Pneumococcal Vaccine (Prevnar and Pneumovax)  * Prevnar = PCV13  * Pneumovax = PPSV23   Adults 25-60 years old: 1-3 doses may be recommended based on certain risk factors  Adults 72 years old: Prevnar (PCV13) vaccine recommended followed by Pneumovax (PPSV23) vaccine  If already received PPSV23 since turning 65, then PCV13 recommended at least one year after PPSV23 dose     Hepatitis B Vaccine 3 dose series if at intermediate or high risk (ex: diabetes, end stage renal disease, liver disease)   Tetanus (Td) Vaccine - COST NOT COVERED BY MEDICARE PART B Following completion of primary series, a booster dose should be given every 10 years to maintain immunity against tetanus  Td may also be given as tetanus wound prophylaxis  Tdap Vaccine - COST NOT COVERED BY MEDICARE PART B Recommended at least once for all adults  For pregnant patients, recommended with each pregnancy  Shingles Vaccine (Shingrix) - COST NOT COVERED BY MEDICARE PART B  2 shot series recommended in those aged 48 and above     Health Maintenance Due:  There are no preventive care reminders to display for this patient  Immunizations Due:      Topic Date Due    COVID-19 Vaccine (1) Never done     Advance Directives   What are advance directives? Advance directives are legal documents that state your wishes and plans for medical care  These plans are made ahead of time in case you lose your ability to make decisions for yourself  Advance directives can apply to any medical decision, such as the treatments you want, and if you want to donate organs  What are the types of advance directives? There are many types of advance directives, and each state has rules about how to use them  You may choose a combination of any of the following:  · Living will: This is a written record of the treatment you want  You can also choose which treatments you do not want, which to limit, and which to stop at a certain time  This includes surgery, medicine, IV fluid, and tube feedings  · Durable power of  for healthcare [place] [facility]): This is a written record that states who you want to make healthcare choices for you when you are unable to make them for yourself  This person, called a proxy, is usually a family member or a friend  You may choose more than 1 proxy  · Do not resuscitate (DNR) order:  A DNR order is used in case your heart stops beating or you stop breathing   It is a request not to have certain forms of treatment, such as CPR  A DNR order may be included in other types of advance directives  · Medical directive: This covers the care that you want if you are in a coma, near death, or unable to make decisions for yourself  You can list the treatments you want for each condition  Treatment may include pain medicine, surgery, blood transfusions, dialysis, IV or tube feedings, and a ventilator (breathing machine)  · Values history: This document has questions about your views, beliefs, and how you feel and think about life  This information can help others choose the care that you would choose  Why are advance directives important? An advance directive helps you control your care  Although spoken wishes may be used, it is better to have your wishes written down  Spoken wishes can be misunderstood, or not followed  Treatments may be given even if you do not want them  An advance directive may make it easier for your family to make difficult choices about your care  Urinary Incontinence   Urinary incontinence (UI)  is when you lose control of your bladder  UI develops because your bladder cannot store or empty urine properly  The 3 most common types of UI are stress incontinence, urge incontinence, or both  Medicines:   · May be given to help strengthen your bladder control  Report any side effects of medication to your healthcare provider  Do pelvic muscle exercises often:  Your pelvic muscles help you stop urinating  Squeeze these muscles tight for 5 seconds, then relax for 5 seconds  Gradually work up to squeezing for 10 seconds  Do 3 sets of 15 repetitions a day, or as directed  This will help strengthen your pelvic muscles and improve bladder control  Train your bladder:  Go to the bathroom at set times, such as every 2 hours, even if you do not feel the urge to go  You can also try to hold your urine when you feel the urge to go  For example, hold your urine for 5 minutes when you feel the urge to go   As that becomes easier, hold your urine for 10 minutes  Self-care:   · Keep a UI record  Write down how often you leak urine and how much you leak  Make a note of what you were doing when you leaked urine  · Drink liquids as directed  You may need to limit the amount of liquid you drink to help control your urine leakage  Do not drink any liquid right before you go to bed  Limit or do not have drinks that contain caffeine or alcohol  · Prevent constipation  Eat a variety of high-fiber foods  Good examples are high-fiber cereals, beans, vegetables, and whole-grain breads  Walking is the best way to trigger your intestines to have a bowel movement  · Exercise regularly and maintain a healthy weight  Weight loss and exercise will decrease pressure on your bladder and help you control your leakage  · Use a catheter as directed  to help empty your bladder  A catheter is a tiny, plastic tube that is put into your bladder to drain your urine  · Go to behavior therapy as directed  Behavior therapy may be used to help you learn to control your urge to urinate  Weight Management   Why it is important to manage your weight:  Being overweight increases your risk of health conditions such as heart disease, high blood pressure, type 2 diabetes, and certain types of cancer  It can also increase your risk for osteoarthritis, sleep apnea, and other respiratory problems  Aim for a slow, steady weight loss  Even a small amount of weight loss can lower your risk of health problems  How to lose weight safely:  A safe and healthy way to lose weight is to eat fewer calories and get regular exercise  You can lose up about 1 pound a week by decreasing the number of calories you eat by 500 calories each day  Healthy meal plan for weight management:  A healthy meal plan includes a variety of foods, contains fewer calories, and helps you stay healthy   A healthy meal plan includes the following:  · Eat whole-grain foods more often   A healthy meal plan should contain fiber  Fiber is the part of grains, fruits, and vegetables that is not broken down by your body  Whole-grain foods are healthy and provide extra fiber in your diet  Some examples of whole-grain foods are whole-wheat breads and pastas, oatmeal, brown rice, and bulgur  · Eat a variety of vegetables every day  Include dark, leafy greens such as spinach, kale, kel greens, and mustard greens  Eat yellow and orange vegetables such as carrots, sweet potatoes, and winter squash  · Eat a variety of fruits every day  Choose fresh or canned fruit (canned in its own juice or light syrup) instead of juice  Fruit juice has very little or no fiber  · Eat low-fat dairy foods  Drink fat-free (skim) milk or 1% milk  Eat fat-free yogurt and low-fat cottage cheese  Try low-fat cheeses such as mozzarella and other reduced-fat cheeses  · Choose meat and other protein foods that are low in fat  Choose beans or other legumes such as split peas or lentils  Choose fish, skinless poultry (chicken or turkey), or lean cuts of red meat (beef or pork)  Before you cook meat or poultry, cut off any visible fat  · Use less fat and oil  Try baking foods instead of frying them  Add less fat, such as margarine, sour cream, regular salad dressing and mayonnaise to foods  Eat fewer high-fat foods  Some examples of high-fat foods include french fries, doughnuts, ice cream, and cakes  · Eat fewer sweets  Limit foods and drinks that are high in sugar  This includes candy, cookies, regular soda, and sweetened drinks  Exercise:  Exercise at least 30 minutes per day on most days of the week  Some examples of exercise include walking, biking, dancing, and swimming  You can also fit in more physical activity by taking the stairs instead of the elevator or parking farther away from stores  Ask your healthcare provider about the best exercise plan for you        © Copyright [employer] 2018 Information is for End User's use only and may not be sold, redistributed or otherwise used for commercial purposes  All illustrations and images included in CareNotes® are the copyrighted property of [employer]  or Leola Fall Debra Collado        Return in about 6 months (around 12/28/2021)  Subjective:      Patient ID: Ridge Ryan is a [de-identified] y o  female  Chief Complaint   Patient presents with   [facility] [place] Wellness Visit     [facility]       Here for follow up and AWV  She is feeling well, except that she has urinary urgency and frequency  Had seen urogyn in the past but did not get a good result with dietary changes and now is using depends  Can't take medications due to flecainide use  Has had elevated glucose in the past   Denies chest pain, dyspnea, palpitations  Follows regularly with cardiology and there have been no changes  The following portions of the patient's history were reviewed and updated as appropriate: allergies, current medications, past family history, past medical history, past social history, past surgical history and problem list     Review of Systems   Constitutional: Negative  Respiratory: Negative  Cardiovascular: Negative  Endocrine: Positive for polyuria  Hematological: Negative            Current Outpatient Medications   Medication Sig Dispense Refill    atorvastatin (LIPITOR) 40 mg tablet Take 1 tablet (40 mg total) by mouth daily 90 tablet 3    b complex vitamins tablet Take 1 tablet by mouth daily      Cholecalciferol (VITAMIN D-3) 1000 units CAPS Take by mouth      flecainide (TAMBOCOR) 50 mg tablet TAKE 1 TABLET BY MOUTH  TWICE DAILY 180 tablet 3    irbesartan (AVAPRO) 300 mg tablet Take 1 tablet (300 mg total) by mouth daily 90 tablet 3    metoprolol succinate (TOPROL-XL) 25 mg 24 hr tablet TAKE 1 TABLET BY MOUTH  DAILY 90 tablet 3    Multiple Vitamins-Minerals (CENTRUM ADULTS PO) Take by mouth      triamterene-hydrochlorothiazide (MAXZIDE-25) 37 5-25 mg per tablet Take 1 tablet by mouth daily 90 tablet 3    Xarelto 20 MG tablet TAKE 1 TABLET BY MOUTH  DAILY WITH DINNER 90 tablet 3     No current facility-administered medications for this visit  Objective:    /78   Pulse 82   Temp 97 5 °F (36 4 °C)   Resp 16   Ht 5' 7 5" (1 715 m)   Wt 120 kg (264 lb)   LMP  (LMP Unknown)   BMI 40 74 kg/m²        Physical Exam  Constitutional:       Appearance: She is well-developed  She is obese  Eyes:      Conjunctiva/sclera: Conjunctivae normal    Neck:      Thyroid: No thyromegaly  Vascular: No JVD  Cardiovascular:      Rate and Rhythm: Normal rate and regular rhythm  Heart sounds: Normal heart sounds  No murmur heard  No friction rub  No gallop  Pulmonary:      Effort: Pulmonary effort is normal       Breath sounds: Normal breath sounds  No wheezing or rales  Abdominal:      General: Bowel sounds are normal  There is no distension  Palpations: Abdomen is soft  Tenderness: There is no abdominal tenderness  Musculoskeletal:      Cervical back: Neck supple  Neurological:      Mental Status: She is alert                  Opal Norton MD

## 2021-06-28 ENCOUNTER — OFFICE VISIT (OUTPATIENT)
Dept: FAMILY MEDICINE CLINIC | Facility: CLINIC | Age: 81
End: 2021-06-28
Payer: COMMERCIAL

## 2021-06-28 VITALS
RESPIRATION RATE: 16 BRPM | DIASTOLIC BLOOD PRESSURE: 78 MMHG | TEMPERATURE: 97.5 F | SYSTOLIC BLOOD PRESSURE: 122 MMHG | HEIGHT: 68 IN | WEIGHT: 264 LBS | BODY MASS INDEX: 40.01 KG/M2 | HEART RATE: 82 BPM

## 2021-06-28 DIAGNOSIS — I48.0 PAROXYSMAL ATRIAL FIBRILLATION (HCC): ICD-10-CM

## 2021-06-28 DIAGNOSIS — E66.01 MORBID OBESITY (HCC): ICD-10-CM

## 2021-06-28 DIAGNOSIS — R35.0 URINARY FREQUENCY: ICD-10-CM

## 2021-06-28 DIAGNOSIS — R73.09 ELEVATED GLUCOSE: ICD-10-CM

## 2021-06-28 DIAGNOSIS — Z00.00 MEDICARE ANNUAL WELLNESS VISIT, SUBSEQUENT: Primary | ICD-10-CM

## 2021-06-28 DIAGNOSIS — E78.2 MIXED HYPERLIPIDEMIA: ICD-10-CM

## 2021-06-28 DIAGNOSIS — I10 BENIGN HYPERTENSION: ICD-10-CM

## 2021-06-28 PROBLEM — Z11.59 SCREENING FOR VIRAL DISEASE: Status: RESOLVED | Noted: 2020-06-09 | Resolved: 2021-06-28

## 2021-06-28 PROCEDURE — G0439 PPPS, SUBSEQ VISIT: HCPCS | Performed by: INTERNAL MEDICINE

## 2021-06-28 PROCEDURE — 99214 OFFICE O/P EST MOD 30 MIN: CPT | Performed by: INTERNAL MEDICINE

## 2021-06-28 RX ORDER — MIRABEGRON 25 MG/1
25 TABLET, FILM COATED, EXTENDED RELEASE ORAL DAILY
Qty: 30 TABLET | Refills: 3 | Status: SHIPPED | OUTPATIENT
Start: 2021-06-28 | End: 2021-06-28

## 2021-06-28 NOTE — PATIENT INSTRUCTIONS
Medicare Preventive Visit Patient Instructions  Thank you for completing your Welcome to Medicare Visit or Medicare Annual Wellness Visit today  Your next wellness visit will be due in one year (6/29/2022)  The screening/preventive services that you may require over the next 5-10 years are detailed below  Some tests may not apply to you based off risk factors and/or age  Screening tests ordered at today's visit but not completed yet may show as past due  Also, please note that scanned in results may not display below  Preventive Screenings:  Service Recommendations Previous Testing/Comments   Colorectal Cancer Screening  * Colonoscopy    * Fecal Occult Blood Test (FOBT)/Fecal Immunochemical Test (FIT)  * Fecal DNA/Cologuard Test  * Flexible Sigmoidoscopy Age: 54-65 years old   Colonoscopy: every 10 years (may be performed more frequently if at higher risk)  OR  FOBT/FIT: every 1 year  OR  Cologuard: every 3 years  OR  Sigmoidoscopy: every 5 years  Screening may be recommended earlier than age 48 if at higher risk for colorectal cancer  Also, an individualized decision between you and your healthcare provider will decide whether screening between the ages of 74-80 would be appropriate  Colonoscopy: Not on file  FOBT/FIT: Not on file  Cologuard: Not on file  Sigmoidoscopy: Not on file          Breast Cancer Screening Age: 36 years old  Frequency: every 1-2 years  Not required if history of left and right mastectomy Mammogram: Not on file        Cervical Cancer Screening Between the ages of 21-29, pap smear recommended once every 3 years  Between the ages of 33-67, can perform pap smear with HPV co-testing every 5 years     Recommendations may differ for women with a history of total hysterectomy, cervical cancer, or abnormal pap smears in past  Pap Smear: Not on file    Screening Not Indicated   Hepatitis C Screening Once for adults born between [facility]  More frequently in patients at high risk for Hepatitis C Hep C Antibody: Not on file        Diabetes Screening 1-2 times per year if you're at risk for diabetes or have pre-diabetes Fasting glucose: 105 mg/dL   A1C: No results in last 5 years    Screening Current   Cholesterol Screening Once every 5 years if you don't have a lipid disorder  May order more often based on risk factors  Lipid panel: 10/12/2020    Screening Not Indicated  History Lipid Disorder     Other Preventive Screenings Covered by Medicare:  1  Abdominal Aortic Aneurysm (AAA) Screening: covered once if your at risk  You're considered to be at risk if you have a family history of AAA  2  Lung Cancer Screening: covers low dose CT scan once per year if you meet all of the following conditions: (1) Age 50-69; (2) No signs or symptoms of lung cancer; (3) Current smoker or have quit smoking within the last 15 years; (4) You have a tobacco smoking history of at least 30 pack years (packs per day multiplied by number of years you smoked); (5) You get a written order from a healthcare provider  3  Glaucoma Screening: covered annually if you're considered high risk: (1) You have diabetes OR (2) Family history of glaucoma OR (3)  aged 48 and older OR (3)  American aged 72 and older  3  Osteoporosis Screening: covered every 2 years if you meet one of the following conditions: (1) You're estrogen deficient and at risk for osteoporosis based off medical history and other findings; (2) Have a vertebral abnormality; (3) On glucocorticoid therapy for more than 3 months; (4) Have primary hyperparathyroidism; (5) On osteoporosis medications and need to assess response to drug therapy  · Last bone density test (DXA Scan): Not on file  5  HIV Screening: covered annually if you're between the age of 12-76  Also covered annually if you are younger than 13 and older than 72 with risk factors for HIV infection   For pregnant patients, it is covered up to 3 times per pregnancy  Immunizations:  Immunization Recommendations   Influenza Vaccine Annual influenza vaccination during flu season is recommended for all persons aged >= 6 months who do not have contraindications   Pneumococcal Vaccine (Prevnar and Pneumovax)  * Prevnar = PCV13  * Pneumovax = PPSV23   Adults 25-60 years old: 1-3 doses may be recommended based on certain risk factors  Adults 72 years old: Prevnar (PCV13) vaccine recommended followed by Pneumovax (PPSV23) vaccine  If already received PPSV23 since turning 65, then PCV13 recommended at least one year after PPSV23 dose  Hepatitis B Vaccine 3 dose series if at intermediate or high risk (ex: diabetes, end stage renal disease, liver disease)   Tetanus (Td) Vaccine - COST NOT COVERED BY MEDICARE PART B Following completion of primary series, a booster dose should be given every 10 years to maintain immunity against tetanus  Td may also be given as tetanus wound prophylaxis  Tdap Vaccine - COST NOT COVERED BY MEDICARE PART B Recommended at least once for all adults  For pregnant patients, recommended with each pregnancy  Shingles Vaccine (Shingrix) - COST NOT COVERED BY MEDICARE PART B  2 shot series recommended in those aged 48 and above     Health Maintenance Due:  There are no preventive care reminders to display for this patient  Immunizations Due:      Topic Date Due    COVID-19 Vaccine (1) Never done     Advance Directives   What are advance directives? Advance directives are legal documents that state your wishes and plans for medical care  These plans are made ahead of time in case you lose your ability to make decisions for yourself  Advance directives can apply to any medical decision, such as the treatments you want, and if you want to donate organs  What are the types of advance directives? There are many types of advance directives, and each state has rules about how to use them   You may choose a combination of any of the following:  · Living will: This is a written record of the treatment you want  You can also choose which treatments you do not want, which to limit, and which to stop at a certain time  This includes surgery, medicine, IV fluid, and tube feedings  · Durable power of  for healthcare [facility]): This is a written record that states who you want to make healthcare choices for you when you are unable to make them for yourself  This person, called a proxy, is usually a family member or a friend  You may choose more than 1 proxy  · Do not resuscitate (DNR) order:  A DNR order is used in case your heart stops beating or you stop breathing  It is a request not to have certain forms of treatment, such as CPR  A DNR order may be included in other types of advance directives  · Medical directive: This covers the care that you want if you are in a coma, near death, or unable to make decisions for yourself  You can list the treatments you want for each condition  Treatment may include pain medicine, surgery, blood transfusions, dialysis, IV or tube feedings, and a ventilator (breathing machine)  · Values history: This document has questions about your views, beliefs, and how you feel and think about life  This information can help others choose the care that you would choose  Why are advance directives important? An advance directive helps you control your care  Although spoken wishes may be used, it is better to have your wishes written down  Spoken wishes can be misunderstood, or not followed  Treatments may be given even if you do not want them  An advance directive may make it easier for your family to make difficult choices about your care  Urinary Incontinence   Urinary incontinence (UI)  is when you lose control of your bladder  UI develops because your bladder cannot store or empty urine properly  The 3 most common types of UI are stress incontinence, urge incontinence, or both    Medicines:   · May be given to help strengthen your bladder control  Report any side effects of medication to your healthcare provider  Do pelvic muscle exercises often:  Your pelvic muscles help you stop urinating  Squeeze these muscles tight for 5 seconds, then relax for 5 seconds  Gradually work up to squeezing for 10 seconds  Do 3 sets of 15 repetitions a day, or as directed  This will help strengthen your pelvic muscles and improve bladder control  Train your bladder:  Go to the bathroom at set times, such as every 2 hours, even if you do not feel the urge to go  You can also try to hold your urine when you feel the urge to go  For example, hold your urine for 5 minutes when you feel the urge to go  As that becomes easier, hold your urine for 10 minutes  Self-care:   · Keep a UI record  Write down how often you leak urine and how much you leak  Make a note of what you were doing when you leaked urine  · Drink liquids as directed  You may need to limit the amount of liquid you drink to help control your urine leakage  Do not drink any liquid right before you go to bed  Limit or do not have drinks that contain caffeine or alcohol  · Prevent constipation  Eat a variety of high-fiber foods  Good examples are high-fiber cereals, beans, vegetables, and whole-grain breads  Walking is the best way to trigger your intestines to have a bowel movement  · Exercise regularly and maintain a healthy weight  Weight loss and exercise will decrease pressure on your bladder and help you control your leakage  · Use a catheter as directed  to help empty your bladder  A catheter is a tiny, plastic tube that is put into your bladder to drain your urine  · Go to behavior therapy as directed  Behavior therapy may be used to help you learn to control your urge to urinate      Weight Management   Why it is important to manage your weight:  Being overweight increases your risk of health conditions such as heart disease, high blood pressure, type 2 diabetes, and certain types of cancer  It can also increase your risk for osteoarthritis, sleep apnea, and other respiratory problems  Aim for a slow, steady weight loss  Even a small amount of weight loss can lower your risk of health problems  How to lose weight safely:  A safe and healthy way to lose weight is to eat fewer calories and get regular exercise  You can lose up about 1 pound a week by decreasing the number of calories you eat by 500 calories each day  Healthy meal plan for weight management:  A healthy meal plan includes a variety of foods, contains fewer calories, and helps you stay healthy  A healthy meal plan includes the following:  · Eat whole-grain foods more often  A healthy meal plan should contain fiber  Fiber is the part of grains, fruits, and vegetables that is not broken down by your body  Whole-grain foods are healthy and provide extra fiber in your diet  Some examples of whole-grain foods are whole-wheat breads and pastas, oatmeal, brown rice, and bulgur  · Eat a variety of vegetables every day  Include dark, leafy greens such as spinach, kale, kel greens, and mustard greens  Eat yellow and orange vegetables such as carrots, sweet potatoes, and winter squash  · Eat a variety of fruits every day  Choose fresh or canned fruit (canned in its own juice or light syrup) instead of juice  Fruit juice has very little or no fiber  · Eat low-fat dairy foods  Drink fat-free (skim) milk or 1% milk  Eat fat-free yogurt and low-fat cottage cheese  Try low-fat cheeses such as mozzarella and other reduced-fat cheeses  · Choose meat and other protein foods that are low in fat  Choose beans or other legumes such as split peas or lentils  Choose fish, skinless poultry (chicken or turkey), or lean cuts of red meat (beef or pork)  Before you cook meat or poultry, cut off any visible fat  · Use less fat and oil  Try baking foods instead of frying them   Add less fat, such as margarine, sour cream, regular salad dressing and mayonnaise to foods  Eat fewer high-fat foods  Some examples of high-fat foods include french fries, doughnuts, ice cream, and cakes  · Eat fewer sweets  Limit foods and drinks that are high in sugar  This includes candy, cookies, regular soda, and sweetened drinks  Exercise:  Exercise at least 30 minutes per day on most days of the week  Some examples of exercise include walking, biking, dancing, and swimming  You can also fit in more physical activity by taking the stairs instead of the elevator or parking farther away from stores  Ask your healthcare provider about the best exercise plan for you  © Copyright [employer] 2018 Information is for End User's use only and may not be sold, redistributed or otherwise used for commercial purposes   All illustrations and images included in CareNotes® are the copyrighted property of A D A M , Inc  or [address] [place] [address]

## 2021-06-28 NOTE — ASSESSMENT & PLAN NOTE
Continue atorvastatin at current dose and will check labs for lipids and LFT's  Advised again on need for exercise and weight loss

## 2021-06-28 NOTE — ASSESSMENT & PLAN NOTE
Hypertension is well controlled and she will continue current medications  We discussed need for exercise and weight loss

## 2021-06-28 NOTE — PROGRESS NOTES
Assessment and Plan:     Problem List Items Addressed This Visit     None           Preventive health issues were discussed with patient, and age appropriate screening tests were ordered as noted in patient's After Visit Summary  Personalized health advice and appropriate referrals for health education or preventive services given if needed, as noted in patient's After Visit Summary  History of Present Illness:     Patient presents for Medicare Annual Wellness visit    Patient Care Team:  Yoel Segura MD as PCP - General  MD Yoel George MD     Problem List:     Patient Active Problem List   Diagnosis    Allergic rhinitis    Benign hypertension    Atrial fibrillation ([place] [address] )    Gastric ulcer    Bilateral edema of lower extremity    Limited peripheral vision    Meningioma ([place] [address] )    Menopause    Mixed hyperlipidemia    Neoplasm of uncertain behavior of skin    Irritable bowel syndrome    Morbid obesity ([place] [address] )    RAJWINDER (obstructive sleep apnea)    Screening for viral disease      Past Medical and Surgical History:     Past Medical History:   Diagnosis Date    A-fib ([place] [address] )     Cancer ([place] [address] )     Hyperlipidemia     Hypertension     UTI (urinary tract infection)      Past Surgical History:   Procedure Laterality Date    HYSTERECTOMY      JOINT REPLACEMENT        Family History:     No family history on file     Social History:     Social History     Socioeconomic History    Marital status: /Civil Union     Spouse name: Not on file    Number of children: Not on file    Years of education: Not on file    Highest education level: Not on file   Occupational History    Not on file   Tobacco Use    Smoking status: Never Smoker    Smokeless tobacco: Never Used   Vaping Use    Vaping Use: Never used   Substance and Sexual Activity    Alcohol use: Not Currently     Comment: rarely    Drug use: No    Sexual activity: Not on file   Other Topics Concern    Not on file   Social History Narrative    Not on file     Social Determinants of Health     Financial Resource Strain:     Difficulty of Paying Living Expenses:    Food Insecurity:     Worried About Running Out of Food in the Last Year:     [address] in the Last Year:    Transportation Needs:     Lack of Transportation (Medical):  Lack of Transportation (Non-Medical):    Physical Activity:     Days of Exercise per Week:     Minutes of Exercise per Session:    Stress:     Feeling of Stress :    Social Connections:     Frequency of Communication with Friends and Family:     Frequency of Social Gatherings with Friends and Family:     Attends Zoroast[employer] Services:     Active Member of Clubs or Organizations:     Attends Club or Organization Meetings:     Marital Status:    Intimate Partner Violence:     Fear of Current or Ex-Partner:     Emotionally Abused:     Physically Abused:     Sexually Abused:       Medications and Allergies:     Current Outpatient Medications   Medication Sig Dispense Refill    atorvastatin (LIPITOR) 40 mg tablet Take 1 tablet (40 mg total) by mouth daily 90 tablet 3    b complex vitamins tablet Take 1 tablet by mouth daily      Cholecalciferol (VITAMIN D-3) 1000 units CAPS Take by mouth      cyclobenzaprine (FLEXERIL) 5 mg tablet Take 5 mg by mouth every 8 (eight) hours as needed      flecainide (TAMBOCOR) 50 mg tablet TAKE 1 TABLET BY MOUTH  TWICE DAILY 180 tablet 3    irbesartan (AVAPRO) 300 mg tablet Take 1 tablet (300 mg total) by mouth daily 90 tablet 3    metoprolol succinate (TOPROL-XL) 25 mg 24 hr tablet TAKE 1 TABLET BY MOUTH  DAILY 90 tablet 3    Multiple Vitamins-Minerals (CENTRUM ADULTS PO) Take by mouth      triamterene-hydrochlorothiazide (MAXZIDE-25) 37 5-25 mg per tablet Take 1 tablet by mouth daily 90 tablet 3    Xarelto 20 MG tablet TAKE 1 TABLET BY MOUTH  DAILY WITH DINNER 90 tablet 3     No current facility-administered medications for this visit  Allergies   Allergen Reactions    Macrodantin [Nitrofurantoin Macrocrystal] Vomiting    Nitrofurantoin     Oxycodone-Acetaminophen Vomiting    Sulfa Antibiotics       Immunizations:     Immunization History   Administered Date(s) Administered    INFLUENZA 11/01/2018    Influenza Split High Dose Preservative Free IM 11/28/2016    Influenza, high dose seasonal 0 7 mL 09/21/2020    Pneumococcal Conjugate 13-Valent 01/01/2016    Pneumococcal Polysaccharide PPV23 01/01/2010    Tdap 05/26/2016    Zoster 01/01/2012      Health Maintenance: There are no preventive care reminders to display for this patient  Topic Date Due    COVID-19 Vaccine (1) Never done      Medicare Health Risk Assessment:     Wt 120 kg (264 lb)   LMP  (LMP Unknown)   BMI 40 74 kg/m²      Lety Yi is here for her Subsequent Wellness visit  Health Risk Assessment:   Patient rates overall health as very good  Patient feels that their physical health rating is same  Patient is very satisfied with their life  Eyesight was rated as slightly worse  Hearing was rated as same  Patient feels that their emotional and mental health rating is slightly better  Patients states they are never, rarely angry  Patient states they are sometimes unusually tired/fatigued  Pain experienced in the last 7 days has been some  Patient's pain rating has been 3/10  Patient states that she has experienced no weight loss or gain in last 6 months  Depression Screening:   PHQ-2 Score: 0      Fall Risk Screening: In the past year, patient has experienced: no history of falling in past year      Urinary Incontinence Screening:   Patient has leaked urine accidently in the last six months  Home Safety:  Patient has trouble with stairs inside or outside of their home  Patient has working smoke alarms and has working carbon monoxide detector  Home safety hazards include: none  Nutrition:   Current diet is Regular       Medications:   Patient is currently taking over-the-counter supplements  OTC medications include: see medication list  Patient is able to manage medications  Activities of Daily Living (ADLs)/Instrumental Activities of Daily Living (IADLs):   Walk and transfer into and out of bed and chair?: Yes  Dress and groom yourself?: Yes    Bathe or shower yourself?: Yes    Feed yourself? Yes  Do your laundry/housekeeping?: Yes  Manage your money, pay your bills and track your expenses?: Yes  Make your own meals?: Yes    Do your own shopping?: Yes    Previous Hospitalizations:   Any hospitalizations or ED visits within the last 12 months?: No      Advance Care Planning:   Living will: Yes    Advanced directive: Yes    End of Life Decisions reviewed with patient: Yes      Cognitive Screening:   Provider or family/friend/caregiver concerned regarding cognition?: No    PREVENTIVE SCREENINGS      Cardiovascular Screening:    General: Screening Not Indicated and History Lipid Disorder      Diabetes Screening:     General: Screening Current      Colorectal Cancer Screening:     General: Screening Not Indicated      Breast Cancer Screening:     General: Screening Not Indicated      Cervical Cancer Screening:    General: Screening Not Indicated      Osteoporosis Screening:    General: Patient Declines      Abdominal Aortic Aneurysm (AAA) Screening:        General: Screening Not Indicated      Lung Cancer Screening:     General: Screening Not Indicated      Hepatitis C Screening:    General: Screening Not Indicated    Screening, Brief Intervention, and Referral to Treatment (SBIRT)    Screening  Typical number of drinks in a day: 0  Typical number of drinks in a week: 0  Interpretation: Low risk drinking behavior      Single Item Drug Screening:  How often have you used an illegal drug (including marijuana) or a prescription medication for non-medical reasons in the past year? never    Single Item Drug Screen Score: 0  Interpretation: Negative screen for possible drug use disorder    Brief Intervention  Alcohol & drug use screenings were reviewed  No concerns regarding substance use disorder identified  Other Counseling Topics:   Car/seat belt/driving safety, skin self-exam, sunscreen and calcium and vitamin D intake and regular weightbearing exercise         Yoel Segura MD

## 2021-08-24 DIAGNOSIS — R60.0 BILATERAL EDEMA OF LOWER EXTREMITY: ICD-10-CM

## 2021-08-24 DIAGNOSIS — I10 BENIGN HYPERTENSION: ICD-10-CM

## 2021-08-24 DIAGNOSIS — I48.0 PAROXYSMAL ATRIAL FIBRILLATION (HCC): ICD-10-CM

## 2021-08-25 RX ORDER — TRIAMTERENE AND HYDROCHLOROTHIAZIDE 37.5; 25 MG/1; MG/1
1 TABLET ORAL DAILY
Qty: 90 TABLET | Refills: 3 | Status: SHIPPED | OUTPATIENT
Start: 2021-08-25 | End: 2022-06-09 | Stop reason: SDUPTHER

## 2021-08-25 RX ORDER — FLECAINIDE ACETATE 50 MG/1
TABLET ORAL
Qty: 180 TABLET | Refills: 3 | Status: SHIPPED | OUTPATIENT
Start: 2021-08-25 | End: 2022-08-01

## 2021-09-05 DIAGNOSIS — I48.0 PAROXYSMAL ATRIAL FIBRILLATION (HCC): ICD-10-CM

## 2021-09-06 RX ORDER — RIVAROXABAN 20 MG/1
TABLET, FILM COATED ORAL
Qty: 90 TABLET | Refills: 3 | Status: SHIPPED | OUTPATIENT
Start: 2021-09-06

## 2021-09-14 ENCOUNTER — OFFICE VISIT (OUTPATIENT)
Dept: PULMONOLOGY | Facility: MEDICAL CENTER | Age: 81
End: 2021-09-14
Payer: COMMERCIAL

## 2021-09-14 VITALS
SYSTOLIC BLOOD PRESSURE: 128 MMHG | OXYGEN SATURATION: 94 % | DIASTOLIC BLOOD PRESSURE: 68 MMHG | BODY MASS INDEX: 41.28 KG/M2 | RESPIRATION RATE: 12 BRPM | TEMPERATURE: 98.7 F | WEIGHT: 263 LBS | HEART RATE: 66 BPM | HEIGHT: 67 IN

## 2021-09-14 DIAGNOSIS — G47.33 OSA (OBSTRUCTIVE SLEEP APNEA): Primary | ICD-10-CM

## 2021-09-14 DIAGNOSIS — I48.0 PAROXYSMAL ATRIAL FIBRILLATION (HCC): ICD-10-CM

## 2021-09-14 DIAGNOSIS — I10 BENIGN HYPERTENSION: ICD-10-CM

## 2021-09-14 DIAGNOSIS — D32.9 MENINGIOMA (HCC): ICD-10-CM

## 2021-09-14 DIAGNOSIS — E66.9 CLASS 3 OBESITY: ICD-10-CM

## 2021-09-14 DIAGNOSIS — R60.0 BILATERAL EDEMA OF LOWER EXTREMITY: ICD-10-CM

## 2021-09-14 DIAGNOSIS — G47.34 NOCTURNAL HYPOXEMIA: ICD-10-CM

## 2021-09-14 DIAGNOSIS — E78.2 MIXED HYPERLIPIDEMIA: ICD-10-CM

## 2021-09-14 PROBLEM — E66.01 CLASS 3 OBESITY (HCC): Status: ACTIVE | Noted: 2021-09-14

## 2021-09-14 PROBLEM — I48.91 ATRIAL FIBRILLATION (HCC): Status: RESOLVED | Noted: 2018-04-16 | Resolved: 2021-09-14

## 2021-09-14 PROBLEM — E66.813 CLASS 3 OBESITY: Status: ACTIVE | Noted: 2021-09-14

## 2021-09-14 PROCEDURE — 99204 OFFICE O/P NEW MOD 45 MIN: CPT | Performed by: INTERNAL MEDICINE

## 2021-09-14 NOTE — ASSESSMENT & PLAN NOTE
- patient diagnosed with mild RAJWINDER in 2019 by home sleep study  - patient reports fatigue, daytime sleepiness, and restoring sleep, abnormal leg movement, nocturia, snoring   - we will order CPAP titration since patient has HTN and a   Fib on multiples medications likely secondary to her underlying RAJWINDER

## 2021-09-14 NOTE — PROGRESS NOTES
Sleep Consultation   Daryle Hoot 80 y o  female MRN: [id number]      Reason for consultation:   Daytime sleepiness    Requesting physician:Dr Mikael De Anda    Assessment/Plan  1  RAJWINDER (obstructive sleep apnea)  Assessment & Plan:  - patient diagnosed with mild RAJWINDER in 2019 by home sleep study  - patient reports fatigue, daytime sleepiness, and restoring sleep, abnormal leg movement, nocturia, snoring   - we will order CPAP titration since patient has HTN and a  Fib on multiples medications likely secondary to her underlying RAJWINDER    Orders:  -     Ambulatory referral to Pulmonology  -     CPAP Study; Future    2  Paroxysmal atrial fibrillation (HCC)  Assessment & Plan:  - PAF follows with cardiology on flecainide and metoprolol as well as xeralto   -likely will get better after controlling her underlying RAWJINDER    Orders:  -     Ambulatory referral to Pulmonology    3  Benign hypertension  Assessment & Plan:  - well controlled , follows with cardiology  -probably will improved after treatment of RAJWINDER    Orders:  -     Ambulatory referral to Pulmonology    4  Mixed hyperlipidemia  -     Ambulatory referral to Pulmonology    5  Meningioma [facility])  -     Ambulatory referral to Pulmonology    6  Bilateral edema of lower extremity  -     Ambulatory referral to Pulmonology    7  Nocturnal hypoxemia  -     CPAP Study; Future    8  Class 3 obesity        History of Present Illness   HPI:  Daryle Hoot is a 80 y o  female with PMHx  Of paroxysmal Afib, hypertension, class 3 obesity, meningioma, hyperlipidemiawho comes in for evaluation of  Her diagnosis of sleep apnea in 2019  Patient was diagnosed with mild sleep apnea in 2019 unfortunately at that point patient due to insurance issues can not do in-lab sleep studies  After that patient reports gaining some weight  And states that her daytime sleepiness is increasing    She reports the needs of taking naps during the day and needed the alarm to actually wake up from does not ups   She reports feeling tired and fatigued almost every day of the week  She does sleep 8-9 hours at night but he does not seem to be enough for her  She wakes up 1-2 times due to nocturia  She reports having lower extremities cramping that improved with movement  He denies any abnormal dreams  No sleep attacks  She follows with Cardiology due to her hypertension Afib requiring multiple medications and this is likely related to her underlying RAJWINDER so at this point we will order titration CPAP studies  Review of Systems      Genitourinary none   Cardiology ankle/leg swelling   Gastrointestinal none   Neurology forgetfulness   Constitutional fatigue   Integumentary none   Psychiatry none   Musculoskeletal leg cramps   Pulmonary none   ENT none   Endocrine none   Hematological none               Historical Information   Past Medical History:   Diagnosis Date    A-fib ([facility] )     Cancer ([facility] )     Hyperlipidemia     Hypertension     UTI (urinary tract infection)      Past Surgical History:   Procedure Laterality Date    HYSTERECTOMY      JOINT REPLACEMENT       No family history on file    Social History     Socioeconomic History    Marital status: /Civil Union     Spouse name: Not on file    Number of children: Not on file    Years of education: Not on file    Highest education level: Not on file   Occupational History    Not on file   Tobacco Use    Smoking status: Never Smoker    Smokeless tobacco: Never Used   Vaping Use    Vaping Use: Never used   Substance and Sexual Activity    Alcohol use: Not Currently     Comment: rarely    Drug use: No    Sexual activity: Not on file   Other Topics Concern    Not on file   Social History Narrative    Not on file     Social Determinants of Health     Financial Resource Strain:     Difficulty of Paying Living Expenses:    Food Insecurity:     Worried About Running Out of Food in the Last Year:     [address] in the Last Year: Transportation Needs:     Lack of Transportation (Medical):  Lack of Transportation (Non-Medical):    Physical Activity:     Days of Exercise per Week:     Minutes of Exercise per Session:    Stress:     Feeling of Stress :    Social Connections:     Frequency of Communication with Friends and Family:     Frequency of Social Gatherings with Friends and Family:     Attends Sikh Services:     Active Member of Clubs or Organizations:     Attends Club or Organization Meetings:     Marital Status:    Intimate Partner Violence:     Fear of Current or Ex-Partner:     Emotionally Abused:     Physically Abused:     Sexually Abused:        Occupational History: retired     Meds/Allergies   Allergies   Allergen Reactions    Macrodantin [Nitrofurantoin Macrocrystal] Vomiting    Nitrofurantoin     Oxycodone-Acetaminophen Vomiting    Sulfa Antibiotics        Home medications:  Prior to Admission medications    Medication Sig Start Date End Date Taking?  Authorizing Provider   atorvastatin (LIPITOR) 40 mg tablet Take 1 tablet (40 mg total) by mouth daily 5/24/21  Yes Xander Salcido MD   b complex vitamins tablet Take 1 tablet by mouth daily   Yes Historical Provider, MD   Cholecalciferol (VITAMIN D-3) 1000 units CAPS Take by mouth   Yes Historical Provider, MD   flecainide (TAMBOCOR) 50 mg tablet TAKE 1 TABLET BY MOUTH  TWICE DAILY 8/25/21  Yes Xander Salcido MD   irbesartan (AVAPRO) 300 mg tablet Take 1 tablet (300 mg total) by mouth daily 12/16/20  Yes Xander Salcido MD   metoprolol succinate (TOPROL-XL) 25 mg 24 hr tablet TAKE 1 TABLET BY MOUTH  DAILY 6/14/21  Yes Xander Salcido MD   Multiple Vitamins-Minerals (CENTRUM ADULTS PO) Take by mouth   Yes Historical Provider, MD   mupirocin (BACTROBAN) 2 % ointment  8/23/21  Yes Historical Provider, MD   triamterene-hydrochlorothiazide (MAXZIDE-25) 37 5-25 mg per tablet TAKE 1 TABLET BY MOUTH  DAILY 8/25/21  Yes Xander Salcido MD Xarelto 20 MG tablet TAKE 1 TABLET BY MOUTH  DAILY WITH DINNER 9/6/21  Yes Antwan Hearn MD       Vitals:   Blood pressure 128/68, pulse 66, temperature 98 7 °F (37 1 °C), temperature source Temporal, resp  rate 12, height 5' 7" (1 702 m), weight 119 kg (263 lb), SpO2 94 % , room air , Body mass index is 41 19 kg/m²  Neck Circumference: 16    Physical Exam  General: Awake alert and oriented x 3, conversant without conversational dyspnea, NAD, normal affect  HEENT:  PERRL, Sclera noninjected, nonicteric OU, Nares patent,  no craniofacial abnormalities, Mucous membranes, moist, no oral lesions, normal dentition, Mallampati class 3  NECK: 16 cm, Trachea midline, no accessory muscle use, no stridor, no cervical or supraclavicular adenopathy, JVP not elevated  CARDIAC: Reg, single s1/S2, no m/r/g  PULM: CTA bilaterally no wheezing, rhonchi or rales  ABD: Normoactive bowel sounds, soft nontender, nondistended, no rebound, no rigidity, no guarding  EXT: No cyanosis, no clubbing, no edema, normal capillary refill  NEURO: no focal neurologic deficits, AAOx3, moving all extremities appropriately    Labs: I have personally reviewed pertinent lab results  Lab Results   Component Value Date    WBC 8 87 10/12/2020    HGB 13 1 10/12/2020    HCT 42 9 10/12/2020    MCV 96 10/12/2020     10/12/2020      Lab Results   Component Value Date    CALCIUM 10 0 10/12/2020    K 4 2 10/12/2020    CO2 28 10/12/2020     10/12/2020    BUN 26 (H) 10/12/2020    CREATININE 1 25 10/12/2020     No results found for: IRON, TIBC, FERRITIN  No results found for: [id number]  No results found for: Ritesh Ramirez MD  [facility]       Portions of the record may have been created with voice recognition software  Occasional wrong word or "sound a like" substitutions may have occurred due to the inherent limitations of voice recognition software   Read the chart carefully and recognize, using context, where substitutions have occurred

## 2021-09-14 NOTE — ASSESSMENT & PLAN NOTE
- patient was recommended lose weight, exercise and healthy diet since this will improve her overall underlying conditions like RAJWINDER, hypertension and AFib

## 2021-09-14 NOTE — ASSESSMENT & PLAN NOTE
- PAF follows with cardiology on flecainide and metoprolol as well as xeralto   -likely will get better after controlling her underlying RAJWINDER

## 2021-09-14 NOTE — PATIENT INSTRUCTIONS
Sleep Apnea   AMBULATORY CARE:   Sleep apnea  is a condition that causes you to stop breathing often during sleep  Types of sleep apnea:   · Obstructive sleep apnea (RAJWINDER)  is the most common kind  The muscles and tissues around your throat relax and block air from passing through  Obesity, use of alcohol or cigarettes, or a family history are common causes  RAJWINDER may increase your risk for complications after surgery  · Central sleep apnea (CSA)  means your brain does not send signals to the muscles that control breathing  You do not take a breath even though your airway is open  Common causes include medical conditions such as heart failure, being older than 40, or use of opioids  · Complex (or mixed) sleep apnea  means you have both obstructive and central sleep apnea  Common signs and symptoms:   · Loud snoring or long pauses in breathing    · Feeling sleepy, slow, and tired during the day    · Snorting, gasping, or choking while you sleep, and waking up suddenly because of these    · Feeling irritable during the day    · Dry mouth or a headache in the mornings    · Heavy night sweating    · A hard time thinking, remembering things, or focusing on your tasks the following day    Call your local emergency number (911 in the [address] [facility]) if:   · You have chest pain or trouble breathing  Call your doctor if:   · You have new or worsening signs or symptoms  · You have questions or concerns about your condition or care  Treatment  depends on the kind of apnea you have  · A mouth device  may be needed if you have mild sleep apnea  These are designed to keep your throat open  Ask your dentist or healthcare provider about the best mouth device for you  · A machine  may be used to help you get more air during sleep  A mask may be placed over your nose and mouth, or just your nose  The mask is hooked to the machine  You will get air through the mask      ? A continuous positive airway pressure (CPAP) machine is used to keep your airway open during sleep  The machine blows a gentle stream of air into the mask when you breathe  This helps keep your airway open so you can breathe more regularly  Extra oxygen may be given through the machine  ? A bilevel positive airway pressure (BiPAP) machine  gives air but lowers the pressure when you breathe out  ? An adaptive servo-ventilator (ASV)  is a machine that learns your usual breathing pattern  Then, it uses pressure to give you air and prevent stops in your breathing  · Surgery  to expand your airway or remove extra tissues may be needed  Surgery is usually only considered if other treatments do not work  Manage or prevent sleep apnea:   · Reach and maintain a healthy weight  Ask your healthcare provider what a healthy weight is for you  Ask him or her to help you create a safe weight loss plan if you are overweight  Even a small goal of a 10% weight loss can improve your symptoms  · Do not smoke  Nicotine and other chemicals in cigarettes and cigars can cause lung damage  Ask your healthcare provider for information if you currently smoke and need help to quit  E-cigarettes or smokeless tobacco still contain nicotine  Talk to your healthcare provider before you use these products  · Do not drink alcohol or take sedative medicine before you go to sleep  Alcohol and sedatives can relax the muscles and tissues around your throat  This can block the airflow to your lungs  · Sleep on your side or use pillows designed to prevent sleep apnea  This prevents your tongue or other tissues from blocking your throat  You can also raise the head of your bed  Follow up with your doctor or specialist as directed: You may need to have blood tests during your follow-up visits  Work with your provider to find the right breathing support equipment and settings for you  Write down your questions so you remember to ask them during your visits    © Copyright [employer] 2021 Information is for [employer] use only and may not be sold, redistributed or otherwise used for commercial purposes  All illustrations and images included in CareNotes® are the copyrighted property of A D A M , Inc  or Leola Collado  The above information is an  only  It is not intended as medical advice for individual conditions or treatments  Talk to your doctor, nurse or pharmacist before following any medical regimen to see if it is safe and effective for you

## 2021-10-04 ENCOUNTER — OFFICE VISIT (OUTPATIENT)
Dept: FAMILY MEDICINE CLINIC | Facility: CLINIC | Age: 81
End: 2021-10-04
Payer: COMMERCIAL

## 2021-10-04 VITALS
DIASTOLIC BLOOD PRESSURE: 60 MMHG | HEIGHT: 67 IN | OXYGEN SATURATION: 94 % | SYSTOLIC BLOOD PRESSURE: 100 MMHG | WEIGHT: 262 LBS | BODY MASS INDEX: 41.12 KG/M2 | TEMPERATURE: 97.3 F | RESPIRATION RATE: 16 BRPM | HEART RATE: 75 BPM

## 2021-10-04 DIAGNOSIS — R21 SKIN ERUPTION: Primary | ICD-10-CM

## 2021-10-04 DIAGNOSIS — Z23 NEED FOR VACCINATION: ICD-10-CM

## 2021-10-04 PROCEDURE — G0008 ADMIN INFLUENZA VIRUS VAC: HCPCS

## 2021-10-04 PROCEDURE — 3078F DIAST BP <80 MM HG: CPT | Performed by: NURSE PRACTITIONER

## 2021-10-04 PROCEDURE — 1160F RVW MEDS BY RX/DR IN RCRD: CPT | Performed by: NURSE PRACTITIONER

## 2021-10-04 PROCEDURE — 99213 OFFICE O/P EST LOW 20 MIN: CPT | Performed by: NURSE PRACTITIONER

## 2021-10-04 PROCEDURE — 90662 IIV NO PRSV INCREASED AG IM: CPT

## 2021-10-04 PROCEDURE — 3074F SYST BP LT 130 MM HG: CPT | Performed by: NURSE PRACTITIONER

## 2021-10-04 RX ORDER — HYDROXYZINE HYDROCHLORIDE 25 MG/1
25 TABLET, FILM COATED ORAL 2 TIMES DAILY
Qty: 30 TABLET | Refills: 0 | Status: SHIPPED | OUTPATIENT
Start: 2021-10-04 | End: 2022-06-09

## 2021-10-04 RX ORDER — PERMETHRIN 50 MG/G
CREAM TOPICAL ONCE
Qty: 60 G | Refills: 0 | Status: SHIPPED | OUTPATIENT
Start: 2021-10-04 | End: 2021-10-04

## 2021-10-13 ENCOUNTER — TELEPHONE (OUTPATIENT)
Dept: FAMILY MEDICINE CLINIC | Facility: CLINIC | Age: 81
End: 2021-10-13

## 2021-10-13 DIAGNOSIS — L50.9 URTICARIA: Primary | ICD-10-CM

## 2021-10-14 RX ORDER — METHYLPREDNISOLONE 4 MG/1
TABLET ORAL
Qty: 21 EACH | Refills: 0 | Status: SHIPPED | OUTPATIENT
Start: 2021-10-14 | End: 2022-06-09

## 2021-10-20 ENCOUNTER — HOSPITAL ENCOUNTER (OUTPATIENT)
Dept: SLEEP CENTER | Facility: CLINIC | Age: 81
Discharge: HOME | End: 2021-10-20
Payer: COMMERCIAL

## 2021-10-20 DIAGNOSIS — G47.33 OSA (OBSTRUCTIVE SLEEP APNEA): ICD-10-CM

## 2021-10-20 DIAGNOSIS — G47.34 NOCTURNAL HYPOXEMIA: ICD-10-CM

## 2021-10-20 PROCEDURE — 95811 POLYSOM 6/>YRS CPAP 4/> PARM: CPT | Performed by: INTERNAL MEDICINE

## 2021-10-20 PROCEDURE — 95811 POLYSOM 6/>YRS CPAP 4/> PARM: CPT

## 2021-10-21 DIAGNOSIS — I10 BENIGN HYPERTENSION: ICD-10-CM

## 2021-10-22 RX ORDER — IRBESARTAN 300 MG/1
TABLET ORAL
Qty: 90 TABLET | Refills: 3 | Status: SHIPPED | OUTPATIENT
Start: 2021-10-22

## 2021-10-23 DIAGNOSIS — G47.33 OSA (OBSTRUCTIVE SLEEP APNEA): Primary | ICD-10-CM

## 2021-10-25 ENCOUNTER — TELEPHONE (OUTPATIENT)
Dept: SLEEP CENTER | Facility: CLINIC | Age: 81
End: 2021-10-25

## 2021-11-01 ENCOUNTER — TELEPHONE (OUTPATIENT)
Dept: PULMONOLOGY | Facility: CLINIC | Age: 81
End: 2021-11-01

## 2021-11-11 ENCOUNTER — OFFICE VISIT (OUTPATIENT)
Dept: NEUROLOGY | Facility: CLINIC | Age: 81
End: 2021-11-11
Payer: COMMERCIAL

## 2021-11-11 VITALS
SYSTOLIC BLOOD PRESSURE: 126 MMHG | TEMPERATURE: 97.7 F | WEIGHT: 253 LBS | HEIGHT: 67 IN | DIASTOLIC BLOOD PRESSURE: 71 MMHG | HEART RATE: 66 BPM | BODY MASS INDEX: 39.71 KG/M2

## 2021-11-11 DIAGNOSIS — Q28.3 CEREBRAL CAVERNOMA: ICD-10-CM

## 2021-11-11 DIAGNOSIS — D32.9 MENINGIOMA (HCC): Primary | ICD-10-CM

## 2021-11-11 PROCEDURE — 3078F DIAST BP <80 MM HG: CPT | Performed by: PSYCHIATRY & NEUROLOGY

## 2021-11-11 PROCEDURE — 3074F SYST BP LT 130 MM HG: CPT | Performed by: PSYCHIATRY & NEUROLOGY

## 2021-11-11 PROCEDURE — 99213 OFFICE O/P EST LOW 20 MIN: CPT | Performed by: PSYCHIATRY & NEUROLOGY

## 2021-11-11 RX ORDER — PERMETHRIN 50 MG/G
CREAM TOPICAL
COMMUNITY
Start: 2021-10-04

## 2021-11-18 ENCOUNTER — OFFICE VISIT (OUTPATIENT)
Dept: CARDIOLOGY CLINIC | Facility: CLINIC | Age: 81
End: 2021-11-18
Payer: COMMERCIAL

## 2021-11-18 VITALS
HEART RATE: 62 BPM | WEIGHT: 262 LBS | BODY MASS INDEX: 41.12 KG/M2 | DIASTOLIC BLOOD PRESSURE: 68 MMHG | OXYGEN SATURATION: 97 % | SYSTOLIC BLOOD PRESSURE: 120 MMHG | TEMPERATURE: 97.8 F | HEIGHT: 67 IN

## 2021-11-18 DIAGNOSIS — I10 BENIGN HYPERTENSION: ICD-10-CM

## 2021-11-18 DIAGNOSIS — E78.2 MIXED HYPERLIPIDEMIA: ICD-10-CM

## 2021-11-18 DIAGNOSIS — G47.33 OSA (OBSTRUCTIVE SLEEP APNEA): ICD-10-CM

## 2021-11-18 DIAGNOSIS — R60.0 BILATERAL EDEMA OF LOWER EXTREMITY: ICD-10-CM

## 2021-11-18 DIAGNOSIS — E66.9 CLASS 3 OBESITY: ICD-10-CM

## 2021-11-18 DIAGNOSIS — I48.0 PAROXYSMAL ATRIAL FIBRILLATION (HCC): ICD-10-CM

## 2021-11-18 DIAGNOSIS — D32.9 MENINGIOMA (HCC): ICD-10-CM

## 2021-11-18 PROCEDURE — 1160F RVW MEDS BY RX/DR IN RCRD: CPT | Performed by: INTERNAL MEDICINE

## 2021-11-18 PROCEDURE — 93000 ELECTROCARDIOGRAM COMPLETE: CPT | Performed by: INTERNAL MEDICINE

## 2021-11-18 PROCEDURE — 99214 OFFICE O/P EST MOD 30 MIN: CPT | Performed by: INTERNAL MEDICINE

## 2022-01-02 ENCOUNTER — OFFICE VISIT (OUTPATIENT)
Dept: URGENT CARE | Facility: CLINIC | Age: 82
End: 2022-01-02
Payer: COMMERCIAL

## 2022-01-02 VITALS — OXYGEN SATURATION: 97 % | RESPIRATION RATE: 14 BRPM | HEART RATE: 91 BPM | TEMPERATURE: 97.1 F

## 2022-01-02 DIAGNOSIS — M54.50 ACUTE BILATERAL LOW BACK PAIN WITHOUT SCIATICA: Primary | ICD-10-CM

## 2022-01-02 PROCEDURE — 99203 OFFICE O/P NEW LOW 30 MIN: CPT | Performed by: PHYSICIAN ASSISTANT

## 2022-01-02 RX ORDER — TOBRAMYCIN AND DEXAMETHASONE 3; 1 MG/ML; MG/ML
SUSPENSION/ DROPS OPHTHALMIC
COMMUNITY
Start: 2021-11-18

## 2022-01-02 RX ORDER — METHOCARBAMOL 500 MG/1
500 TABLET, FILM COATED ORAL 3 TIMES DAILY PRN
Qty: 21 TABLET | Refills: 0 | Status: SHIPPED | OUTPATIENT
Start: 2022-01-02 | End: 2022-01-14 | Stop reason: SDUPTHER

## 2022-01-02 RX ORDER — METHYLPREDNISOLONE 4 MG/1
TABLET ORAL
Qty: 21 TABLET | Refills: 0 | Status: SHIPPED | OUTPATIENT
Start: 2022-01-02 | End: 2022-01-02

## 2022-01-02 RX ORDER — METHOCARBAMOL 500 MG/1
500 TABLET, FILM COATED ORAL 3 TIMES DAILY PRN
Qty: 21 TABLET | Refills: 0 | Status: SHIPPED | OUTPATIENT
Start: 2022-01-02 | End: 2022-01-02

## 2022-01-02 RX ORDER — METHYLPREDNISOLONE 4 MG/1
TABLET ORAL
Qty: 21 TABLET | Refills: 0 | Status: SHIPPED | OUTPATIENT
Start: 2022-01-02 | End: 2022-06-09

## 2022-01-02 NOTE — PATIENT INSTRUCTIONS
Continue to monitor symptoms  If new or worsening symptoms develop, go immediately to Er  Drink plenty of fluids  Follow up with Family Doctor this week  Acute Low Back Pain   WHAT YOU NEED TO KNOW:   Acute low back pain is sudden discomfort that lasts up to 6 weeks and makes activity difficult  DISCHARGE INSTRUCTIONS:   Return to the emergency department if:   · You have severe pain  · You have sudden stiffness and heaviness on both buttocks down to both legs  · You have numbness or weakness in one leg, or pain in both legs  · You have numbness in your genital area or across your lower back  · You cannot control your urine or bowel movements  Call your doctor if:   · You have a fever  · You have pain at night or when you rest     · Your pain does not get better with treatment  · You have pain that worsens when you cough or sneeze  · You suddenly feel something pop or snap in your back  · You have questions or concerns about your condition or care  Medicines: You may need any of the following:  · NSAIDs , such as ibuprofen, help decrease swelling, pain, and fever  This medicine is available with or without a doctor's order  NSAIDs can cause stomach bleeding or kidney problems in certain people  If you take blood thinner medicine, always ask your healthcare provider if NSAIDs are safe for you  Always read the medicine label and follow directions  · Acetaminophen  decreases pain and fever  It is available without a doctor's order  Ask how much to take and how often to take it  Follow directions  Read the labels of all other medicines you are using to see if they also contain acetaminophen, or ask your doctor or pharmacist  Acetaminophen can cause liver damage if not taken correctly  Do not use more than 4 grams (4,000 milligrams) total of acetaminophen in one day  · Muscle relaxers  decrease pain by relaxing the muscles in your lower spine      · Prescription pain medicine may be given  Ask your healthcare provider how to take this medicine safely  Some prescription pain medicines contain acetaminophen  Do not take other medicines that contain acetaminophen without talking to your healthcare provider  Too much acetaminophen may cause liver damage  Prescription pain medicine may cause constipation  Ask your healthcare provider how to prevent or treat constipation  Self-care:   · Stay active  as much as you can without causing more pain  Bed rest could make your back pain worse  Start with some light exercises, such as walking  Avoid heavy lifting until your pain is gone  Ask for more information about the activities or exercises that are right for you  · Apply heat  on your back for 20 to 30 minutes every 2 hours for as many days as directed  Heat helps decrease pain and muscle spasms  Alternate heat and ice  · Apply ice  on your back for 15 to 20 minutes every hour or as directed  Use an ice pack, or put crushed ice in a plastic bag  Cover it with a towel before you apply it to your skin  Ice helps prevent tissue damage and decreases swelling and pain  Prevent acute low back pain:   · Use proper body mechanics  ? Bend at the hips and knees when you  objects  Do not bend from the waist  Use your leg muscles as you lift the load  Do not use your back  Keep the object close to your chest as you lift it  Try not to twist or lift anything above your waist     ? Change your position often when you stand for long periods of time  Rest one foot on a small box or footrest, and then switch to the other foot often  ? Try not to sit for long periods of time  When you do, sit in a straight-backed chair with your feet flat on the floor  Never reach, pull, or push while you are sitting  · Do exercises that strengthen your back muscles  Warm up before you exercise  Ask your healthcare provider the best exercises for you  · Maintain a healthy weight    Ask your healthcare provider what a healthy weight is for you  Ask him or her to help you create a weight loss plan if you are overweight  Follow up with your doctor as directed:  Return for a follow-up visit if you still have pain after 1 to 3 weeks of treatment  You may need to visit an orthopedist if your back pain lasts longer than 12 weeks  Write down your questions so you remember to ask them during your visits  © Copyright [employer] 2021 Information is for End User's use only and may not be sold, redistributed or otherwise used for commercial purposes  All illustrations and images included in CareNotes® are the copyrighted property of A D A M , Inc  or [facility] Juan David Richardson   The above information is an  only  It is not intended as medical advice for individual conditions or treatments  Talk to your doctor, nurse or pharmacist before following any medical regimen to see if it is safe and effective for you

## 2022-01-02 NOTE — PROGRESS NOTES
[facility]        NAME: Paty Packer is a 80 y o  female  : 1940    MRN: [id number]  DATE: 2022  TIME: 4:11 PM    Assessment and Plan   Acute bilateral low back pain without sciatica [M54 50]  1  Acute bilateral low back pain without sciatica  methylPREDNISolone 4 MG tablet therapy pack    methocarbamol (ROBAXIN) 500 mg tablet         Patient Instructions     Continue to monitor symptoms  If new or worsening symptoms develop, go immediately to Er  Drink plenty of fluids  Follow up with Family Doctor this week  Chief Complaint     Chief Complaint   Patient presents with    Back Pain     pt presents with back pain of unknown etiology, started on Saturday         History of Present Illness       Back Pain  This is a recurrent problem  Episode onset: This episode started a few days ago  She was hanging out with her family and was sitting on a hard wooden chair for a long period  She started to feel back spasms and it has slowly worsend  Feels identical to previous episodes  The problem occurs constantly  The problem is unchanged  The pain is present in the lumbar spine  The quality of the pain is described as aching  The pain does not radiate  The pain is severe  The pain is the same all the time  The symptoms are aggravated by standing (Walking)  Stiffness is present all day  Pertinent negatives include no abdominal pain, bladder incontinence, bowel incontinence, chest pain, dysuria, fever, headaches, leg pain, numbness, paresis, paresthesias, pelvic pain, perianal numbness, tingling or weakness  She has tried nothing for the symptoms  The treatment provided no relief  In the past she has had significant relief with medrol and muscle relaxer  Review of Systems   Review of Systems   Constitutional: Negative  Negative for chills, fatigue and fever  HENT: Negative  Eyes: Negative  Respiratory: Negative  Negative for chest tightness, shortness of breath and wheezing  Cardiovascular: Negative  Negative for chest pain and palpitations  Gastrointestinal: Negative for abdominal pain, bowel incontinence, constipation, diarrhea, nausea and vomiting  Endocrine: Negative  Genitourinary: Negative for bladder incontinence, dysuria, flank pain, frequency, pelvic pain, vaginal discharge and vaginal pain  Musculoskeletal: Positive for back pain  Negative for gait problem, neck pain and neck stiffness  Skin: Negative  Allergic/Immunologic: Negative  Neurological: Negative  Negative for tingling, weakness, numbness, headaches and paresthesias  Hematological: Negative  Psychiatric/Behavioral: Negative            Current Medications       Current Outpatient Medications:     atorvastatin (LIPITOR) 40 mg tablet, Take 1 tablet (40 mg total) by mouth daily, Disp: 90 tablet, Rfl: 3    b complex vitamins tablet, Take 1 tablet by mouth daily, Disp: , Rfl:     Cholecalciferol (VITAMIN D-3) 1000 units CAPS, Take by mouth, Disp: , Rfl:     flecainide (TAMBOCOR) 50 mg tablet, TAKE 1 TABLET BY MOUTH  TWICE DAILY, Disp: 180 tablet, Rfl: 3    irbesartan (AVAPRO) 300 mg tablet, TAKE 1 TABLET BY MOUTH  DAILY, Disp: 90 tablet, Rfl: 3    metoprolol succinate (TOPROL-XL) 25 mg 24 hr tablet, TAKE 1 TABLET BY MOUTH  DAILY, Disp: 90 tablet, Rfl: 3    Multiple Vitamins-Minerals (CENTRUM ADULTS PO), Take by mouth, Disp: , Rfl:     permethrin (ELIMITE) 5 % cream, , Disp: , Rfl:     tobramycin-dexamethasone (TOBRADEX) ophthalmic suspension, , Disp: , Rfl:     triamterene-hydrochlorothiazide (MAXZIDE-25) 37 5-25 mg per tablet, TAKE 1 TABLET BY MOUTH  DAILY, Disp: 90 tablet, Rfl: 3    Xarelto 20 MG tablet, TAKE 1 TABLET BY MOUTH  DAILY WITH DINNER, Disp: 90 tablet, Rfl: 3    halobetasol (ULTRAVATE) 0 05 % cream, , Disp: , Rfl:     hydrOXYzine HCL (ATARAX) 25 mg tablet, Take 1 tablet (25 mg total) by mouth 2 (two) times a day (Patient not taking: Reported on 11/18/2021 ), Disp: 30 tablet, Rfl: 0    methocarbamol (ROBAXIN) 500 mg tablet, Take 1 tablet (500 mg total) by mouth 3 (three) times a day as needed for muscle spasms, Disp: 21 tablet, Rfl: 0    methylPREDNISolone 4 MG tablet therapy pack, Use as directed on package (Patient not taking: Reported on 11/18/2021 ), Disp: 21 each, Rfl: 0    methylPREDNISolone 4 MG tablet therapy pack, Use as directed on package, Disp: 21 tablet, Rfl: 0    mupirocin (BACTROBAN) 2 % ointment, , Disp: , Rfl:     Current Allergies     Allergies as of 01/02/2022 - Reviewed 01/02/2022   Allergen Reaction Noted    Macrodantin [nitrofurantoin macrocrystal] Vomiting 07/16/2016    Nitrofurantoin  12/24/2013    Oxycodone-acetaminophen Vomiting 05/03/2016    Sulfa antibiotics  12/24/2013            The following portions of the patient's history were reviewed and updated as appropriate: allergies, current medications, past family history, past medical history, past social history, past surgical history and problem list      Past Medical History:   Diagnosis Date    [facility]     [facility]     Hyperlipidemia     Hypertension     UTI (urinary tract infection)        Past Surgical History:   Procedure Laterality Date    HYSTERECTOMY      JOINT REPLACEMENT         Family History   Problem Relation Age of Onset    Alzheimer's disease Mother     Lung cancer Father     Multiple sclerosis Daughter          Medications have been verified  Objective   Pulse 91   Temp (!) 97 1 °F (36 2 °C)   Resp 14   LMP  (LMP Unknown)   SpO2 97%        Physical Exam     Physical Exam  Vitals and nursing note reviewed  Constitutional:       General: She is not in acute distress  Appearance: Normal appearance  She is well-developed  She is not ill-appearing or diaphoretic  HENT:      Head: Normocephalic and atraumatic  Cardiovascular:      Rate and Rhythm: Normal rate and regular rhythm  Heart sounds: Normal heart sounds     Pulmonary:      Effort: Pulmonary effort is normal  No respiratory distress  Breath sounds: Normal breath sounds  No wheezing, rhonchi or rales  Abdominal:      General: Bowel sounds are normal  There is no distension  Palpations: Abdomen is soft  Tenderness: There is no abdominal tenderness  There is no right CVA tenderness, left CVA tenderness or guarding  Skin:     General: Skin is warm  Capillary Refill: Capillary refill takes less than 2 seconds  Coloration: Skin is not pale  Findings: No rash  Neurological:      Mental Status: She is alert

## 2022-01-14 ENCOUNTER — TELEPHONE (OUTPATIENT)
Dept: FAMILY MEDICINE CLINIC | Facility: CLINIC | Age: 82
End: 2022-01-14

## 2022-01-14 DIAGNOSIS — M54.50 ACUTE BILATERAL LOW BACK PAIN WITHOUT SCIATICA: ICD-10-CM

## 2022-01-14 RX ORDER — METHOCARBAMOL 500 MG/1
500 TABLET, FILM COATED ORAL 3 TIMES DAILY PRN
Qty: 21 TABLET | Refills: 0 | Status: SHIPPED | OUTPATIENT
Start: 2022-01-14

## 2022-01-14 NOTE — TELEPHONE ENCOUNTER
Refill done  Sent to pharmacy as requested    Please let her daughter know it was done    Thank you,  Elina Ramos, DO

## 2022-01-14 NOTE — TELEPHONE ENCOUNTER
methocarbamol (ROBAXIN) 500 mg tablet [[id number]]     [place] [facility] seen [facility]  She is now in [place] and can't get in with her PCP down there until next week    Daughter is calling to ask if we can refill above medication since she is in [place] (in winter she lives in [place])      [facility] in [address]    Thank You

## 2022-03-12 DIAGNOSIS — E78.2 MIXED HYPERLIPIDEMIA: ICD-10-CM

## 2022-03-14 RX ORDER — ATORVASTATIN CALCIUM 40 MG/1
TABLET, FILM COATED ORAL
Qty: 90 TABLET | Refills: 3 | Status: SHIPPED | OUTPATIENT
Start: 2022-03-14

## 2022-05-18 DIAGNOSIS — I48.0 PAROXYSMAL ATRIAL FIBRILLATION (HCC): ICD-10-CM

## 2022-05-19 RX ORDER — METOPROLOL SUCCINATE 25 MG/1
TABLET, EXTENDED RELEASE ORAL
Qty: 90 TABLET | Refills: 3 | Status: SHIPPED | OUTPATIENT
Start: 2022-05-19

## 2022-06-09 ENCOUNTER — OFFICE VISIT (OUTPATIENT)
Dept: CARDIOLOGY CLINIC | Facility: CLINIC | Age: 82
End: 2022-06-09
Payer: COMMERCIAL

## 2022-06-09 VITALS
SYSTOLIC BLOOD PRESSURE: 118 MMHG | HEIGHT: 67 IN | WEIGHT: 241 LBS | TEMPERATURE: 97 F | DIASTOLIC BLOOD PRESSURE: 72 MMHG | BODY MASS INDEX: 37.83 KG/M2 | HEART RATE: 60 BPM | OXYGEN SATURATION: 97 %

## 2022-06-09 DIAGNOSIS — E66.9 CLASS 3 OBESITY: ICD-10-CM

## 2022-06-09 DIAGNOSIS — R60.0 BILATERAL EDEMA OF LOWER EXTREMITY: ICD-10-CM

## 2022-06-09 DIAGNOSIS — D32.9 MENINGIOMA (HCC): ICD-10-CM

## 2022-06-09 DIAGNOSIS — I48.0 PAROXYSMAL ATRIAL FIBRILLATION (HCC): ICD-10-CM

## 2022-06-09 DIAGNOSIS — G47.33 OSA (OBSTRUCTIVE SLEEP APNEA): ICD-10-CM

## 2022-06-09 DIAGNOSIS — N18.30 STAGE 3 CHRONIC KIDNEY DISEASE, UNSPECIFIED WHETHER STAGE 3A OR 3B CKD (HCC): Primary | ICD-10-CM

## 2022-06-09 DIAGNOSIS — E66.01 MORBID OBESITY (HCC): ICD-10-CM

## 2022-06-09 DIAGNOSIS — I10 BENIGN HYPERTENSION: ICD-10-CM

## 2022-06-09 DIAGNOSIS — E78.2 MIXED HYPERLIPIDEMIA: ICD-10-CM

## 2022-06-09 PROCEDURE — 99214 OFFICE O/P EST MOD 30 MIN: CPT | Performed by: INTERNAL MEDICINE

## 2022-06-09 PROCEDURE — 3074F SYST BP LT 130 MM HG: CPT | Performed by: INTERNAL MEDICINE

## 2022-06-09 PROCEDURE — 1160F RVW MEDS BY RX/DR IN RCRD: CPT | Performed by: INTERNAL MEDICINE

## 2022-06-09 PROCEDURE — 3078F DIAST BP <80 MM HG: CPT | Performed by: INTERNAL MEDICINE

## 2022-06-09 PROCEDURE — 93000 ELECTROCARDIOGRAM COMPLETE: CPT | Performed by: INTERNAL MEDICINE

## 2022-06-09 RX ORDER — TRIAMTERENE AND HYDROCHLOROTHIAZIDE 37.5; 25 MG/1; MG/1
0.5 TABLET ORAL DAILY
Qty: 45 TABLET | Refills: 3 | Status: SHIPPED | OUTPATIENT
Start: 2022-06-09

## 2022-06-09 NOTE — PROGRESS NOTES
Progress Note - Cardiology Office  [facility]    Kvng Alfredo 80 y o  female MRN: [id number]  : 1940  Encounter: [id number]      Assessment:     1  Stage 3 chronic kidney disease, unspecified whether stage 3a or 3b CKD (HCC)    2  Paroxysmal atrial fibrillation ([facility] )    3  Benign hypertension    4  Mixed hyperlipidemia    5  Class 3 obesity with BMI around 42    6  RAJWINDER (obstructive sleep apnea)    7  Meningioma ([facility] )    8  Bilateral edema of lower extremity    9  Morbid obesity ([facility] )        Discussion Summary and Plan:  1  Paroxysmal atrial fibrillation  Patient is staying in sinus rhythm  She has no reoccurrence  Continue Xarelto and continue flecainide  She is on low-dose metoprolol heart rate 62 beats per minute no issues with bleeding continue current medications   Continue Xarelto tolerating well      2  Essential hypertension  Her blood pressure is now much better  In fact sometime on the lower side  She had a blood test done in April in [facility] she will get us a copy  She occasionally feel dizziness because her BMI now around 37  I have cut back Maxzide to half tablet daily  Continue metoprolol XL and Avapro as before  She will send us a copy of blood test        3  Dyslipidemia  Continue statins  She is on Lipitor  Will send us a copy of blood test     4  Obesity with BMI around now 40  She has lost about 25 lb  She is feeling much better  5  Obstructive sleep apnea  She had history of versus Pap knee a but she could not tolerate CPAP machine  6  Bilateral lower extremity edema  No leg swelling at this time  Diuretics decreased  Will check electrolytes which was done in April  7  History of meningioma    Continue current Rx  Follow-up in 6 months she is tolerating her Xarelto, metoprolol XL and flecainide very well  Will check her flecainide level and BMP  Please call [phone] if any questions    Counseling :  EILEEN description of the counseling  Goals and Barriers  Patient's ability to self care: Yes  Medication side effect reviewed with patient in detail and all their questions answered to their satisfaction  HPI :     Dena Kaufman is a 80y o  year old female who came for follow up  Patient was recently admitted to [facility] with palpitations and rapid heartbeat and was found to be in AFib with rapid ventricular rate  She has a past medical history significant for essential hypertension, obesity with BMI around 39, dyslipidemia, previous history of atrial fibrillation but not on antithrombotic therapy as she was getting brief episodes, who noted she went into AFib earlier that morning and was sent to the emergency room  Patient spontaneously converted back to sinus rhythm and was started on antithrombotic therapy and added low-dose metoprolol  Currently she came for follow-up  She did have some rash she is not sure it related to Toprol XL or Xarelto but it has slowly improved     At this time she denies any chest pain any shortness of breath  She has some chronic shortness of breath which is not changed  No nausea no vomiting no PND no orthopnea no palpitations at all  She does monitor heart rate regularly at generally is around 70 beats per minute  11/18/2021  Above reviewed  Patient came for follow-up  She has medical history significant for paroxysmal atrial fibrillation, essential hypertension, dyslipidemia, obesity with BMI around 41, history of knee replacement surgery who came for follow-up  She also history of recurrent UTI  She has previous history of atrial fibrillation when she had UTIs she spontaneously converted back to regular rhythm  He was started on low-dose flecainide since then she has no further episodes of atrial fibrillation  She is on antithrombotic therapy with Xarelto and she is compliant with her cholesterol medications    Her blood pressure is well controlled with current therapy  Recently she was diagnosed to have eczema she also had sleep study which she failed and now she needs CPAP machine  She gets anxious about it and that has been affecting her quality of life  She will discussed with primary care doctor may need to see a psychologist   She is also very anxious about her knees passing away who had a brain aneurysm  06/09/2022  Above reviewed  Patient came for follow-up she is feeling well she has lost about 21 lb her BMI now around 37 she feels better  She had a medical history significant for paroxysmal atrial fibrillation, essential hypertension, dyslipidemia, class 2 obesity and history of knee replacement surgery  She also history of recurrent UTI and she has been doing well  Every time she had reoccurrence of atrial fibrillation it happen when she has a UTI  She was started on low-dose flecainide since then she has no more episode of atrial fibrillation  She is on antithrombotic therapy with Xarelto and she has been compliant with her vitals has been stable today EKG shows sinus rhythm heart rate 60 beats per minute first-degree AV block nonspecific ST changes no nausea no vomiting no fever no chills no other issues at this time  Since she has lost weight she is feeling some dizziness lightheadedness occasionally blood pressure even dropping up to systolic of 534    Review of Systems   Constitutional: Positive for fatigue  Negative for activity change, chills, diaphoresis, fever and unexpected weight change  Overall she is doing much better than before   HENT: Negative for congestion  Eyes: Negative for discharge and redness  Respiratory: Negative for cough, chest tightness, shortness of breath and wheezing  Cardiovascular: Negative  Negative for chest pain, palpitations and leg swelling  Gastrointestinal: Negative for abdominal pain, diarrhea and nausea  Endocrine: Negative      Genitourinary: Negative for decreased urine volume and urgency  Musculoskeletal: Positive for arthralgias and gait problem  Negative for back pain  Skin: Negative for rash and wound  Allergic/Immunologic: Negative  Neurological: Positive for light-headedness  Negative for dizziness, seizures, syncope, weakness and headaches  Hematological: Negative  Psychiatric/Behavioral: Negative for agitation and confusion  The patient is nervous/anxious          Historical Information   Past Medical History:   Diagnosis Date    [email or web address]     Cancer ([email or web address]     Hyperlipidemia     Hypertension     UTI (urinary tract infection)      Past Surgical History:   Procedure Laterality Date    HYSTERECTOMY      JOINT REPLACEMENT       Social History     Substance and Sexual Activity   Alcohol Use Not Currently    Comment: rarely     Social History     Substance and Sexual Activity   Drug Use No     Social History     Tobacco Use   Smoking Status Never Smoker   Smokeless Tobacco Never Used     Family History:   Family History   Problem Relation Age of Onset    Alzheimer's disease Mother     Lung cancer Father     Multiple sclerosis Daughter        Meds/Allergies     Allergies   Allergen Reactions    Macrodantin [Nitrofurantoin Macrocrystal] Vomiting    Nitrofurantoin     Oxycodone-Acetaminophen Vomiting    Sulfa Antibiotics        Current Outpatient Medications:     atorvastatin (LIPITOR) 40 mg tablet, TAKE 1 TABLET BY MOUTH  DAILY, Disp: 90 tablet, Rfl: 3    b complex vitamins tablet, Take 1 tablet by mouth daily, Disp: , Rfl:     Cholecalciferol (VITAMIN D-3) 1000 units CAPS, Take by mouth, Disp: , Rfl:     flecainide (TAMBOCOR) 50 mg tablet, TAKE 1 TABLET BY MOUTH  TWICE DAILY, Disp: 180 tablet, Rfl: 3    irbesartan (AVAPRO) 300 mg tablet, TAKE 1 TABLET BY MOUTH  DAILY, Disp: 90 tablet, Rfl: 3    methocarbamol (ROBAXIN) 500 mg tablet, Take 1 tablet (500 mg total) by mouth 3 (three) times a day as needed for muscle spasms, Disp: 21 tablet, Rfl: 0    metoprolol succinate (TOPROL-XL) 25 mg 24 hr tablet, TAKE 1 TABLET BY MOUTH  DAILY, Disp: 90 tablet, Rfl: 3    Multiple Vitamins-Minerals (CENTRUM ADULTS PO), Take by mouth, Disp: , Rfl:     permethrin (ELIMITE) 5 % cream, , Disp: , Rfl:     tobramycin-dexamethasone (TOBRADEX) ophthalmic suspension, , Disp: , Rfl:     triamterene-hydrochlorothiazide (MAXZIDE-25) 37 5-25 mg per tablet, Take 0 5 tablets by mouth daily, Disp: 45 tablet, Rfl: 3    Xarelto 20 MG tablet, TAKE 1 TABLET BY MOUTH  DAILY WITH DINNER, Disp: 90 tablet, Rfl: 3    halobetasol (ULTRAVATE) 0 05 % cream, , Disp: , Rfl:     mupirocin (BACTROBAN) 2 % ointment, , Disp: , Rfl:     Vitals: Blood pressure 118/72, pulse 60, temperature (!) 97 °F (36 1 °C), height 5' 7" (1 702 m), weight 109 kg (241 lb), SpO2 97 %  ?  Body mass index is 37 75 kg/m²  Vitals:    06/09/22 1404   Weight: 109 kg (241 lb)     BP Readings from Last 3 Encounters:   06/09/22 118/72   11/18/21 120/68   11/11/21 126/71         Physical Exam    Diagnostic Studies Review Cardio:    Echo Doppler  Echo Doppler done 08/23/2019 shows EF 60%, left atrium size was normal, right atrium mildly dilated, no significant valvular disease  Nuclear stress test   Nuclear stress test done 10/09/2019 was probably normal   Exercised for 5 minutes and 51 seconds  EF was 75%  No perfusion abnormality was noted  There was imaged artifact noted  EKG:  Twelve lead EKG done 09/25/2019 shows normal sinus rhythm heart rate 70 beats per minute  Twelve lead EKG done today 10/16/2019 shows normal sinus rhythm heart rate 74 beats per minute  Twelve lead EKG 12/06/2019 shows normal sinus rhythm heart rate 86 beats per minute  She is staying in sinus rhythm normal intervals  Twelve lead EKG done 06/09/2020 shows atrial flutter with heart rate 94 beats per minute  Nonspecific ST changes  As compared to previous EKG patient is now in atrial flutter      Twelve lead EKG done 10/28/2020 shows sinus rhythm first-degree AV block heart rate 78 beats per minute  As compared to previous EKG patient is in sinus rhythm  Twelve lead EKG 2021 shows sinus rhythm first-degree AV block heart rate 69 beats per minute  12 lead EKG done on 2021 shows normal sinus rhythm first-degree AV block heart rate 62 beats per minute nonspecific ST changes QTC interval is acceptable  12 lead EKG 2022 shows sinus rhythm first-degree AV block heart rate 60 beats per minute nonspecific ST  Changes  Cardiac testing:   Results for orders placed during the hospital encounter of 19   Echo complete with contrast if indicated    Narrative [address]  [address] [facility] [address]  [phone]    Transthoracic Echocardiogram  2D, M-mode, Doppler, and Color Doppler    Study date:  23-Aug-2019    Patient: [facility]  MR number: [id number]  Account number: [de-identified]  : 1940  Age: 78 years  Gender: Female  Status: Outpatient  Location: Bedside  Height: 67 in  Weight: 250 6 lb  BP: 152/ 72 mmHg    Indications: A Fib  Diagnoses: I48 0 - Atrial fibrillation    Sonographer:  CHENG Fu  Primary Physician:  Casi Lagos MD  Referring Physician:  Avani Bowen MD  Group:  [place] [id number] [facility]  Interpreting Physician:  Avani Bowen MD    SUMMARY    LEFT VENTRICLE:  Systolic function was normal  Ejection fraction was estimated in the range of 55 % to 60 % to be 60 %  There were no regional wall motion abnormalities  Wall thickness was mildly increased  There was mild concentric hypertrophy  RIGHT ATRIUM:  The atrium was mildly dilated  MITRAL VALVE:  There was trace regurgitation  TRICUSPID VALVE:  There was mild regurgitation  Estimated peak PA pressure was 30 mmHg  HISTORY: PRIOR HISTORY: HTN,Hyperlipidemia,A Fib ,Cancer  PROCEDURE: The procedure was performed at the bedside   This was a routine study  The transthoracic approach was used  The study included complete 2D imaging, M-mode, complete spectral Doppler, and color Doppler  The heart rate was 69 bpm,  at the start of the study  Images were obtained from the parasternal, apical, subcostal, and suprasternal notch acoustic windows  Image quality was adequate  LEFT VENTRICLE: Size was normal  Systolic function was normal  Ejection fraction was estimated in the range of 55 % to 60 % to be 60 %  There were no regional wall motion abnormalities  Wall thickness was mildly increased  There was mild  concentric hypertrophy  DOPPLER: Left ventricular diastolic function parameters were normal for the patient's age  RIGHT VENTRICLE: The size was normal  Systolic function was normal     LEFT ATRIUM: Size was normal     RIGHT ATRIUM: The atrium was mildly dilated  MITRAL VALVE: There was normal leaflet separation  DOPPLER: The transmitral velocity was within the normal range  There was no evidence for stenosis  There was trace regurgitation  AORTIC VALVE: The valve was trileaflet  Leaflets exhibited mildly increased thickness and normal cuspal separation  DOPPLER: Transaortic velocity was within the normal range  There was no evidence for stenosis  There was no regurgitation  TRICUSPID VALVE: The valve structure was normal  There was normal leaflet separation  DOPPLER: The transtricuspid velocity was within the normal range  There was no evidence for stenosis  There was mild regurgitation  Estimated peak PA  pressure was 30 mmHg  PULMONIC VALVE: DOPPLER: There was no significant regurgitation  PERICARDIUM: There was no thickening or calcification  There was no pericardial effusion  AORTA: The root exhibited normal size  SYSTEMIC VEINS: IVC: The inferior vena cava was normal in size   Respirophasic changes were normal     SYSTEM MEASUREMENT TABLES    2D mode  AoR Diam 2D: 3 3 cm  LA Diam (2D): 3 9 cm  LA/Ao (2D): 1 18  FS (2D Teich): 28 2 %  IVSd (2D): 1 21 cm  LVDEV: 78 1 cmï¾³  LVESV: 35 3 cmï¾³  LVIDd(2D): 4 19 cm  LVISd (2D): 3 01 cm  LVOT Area 2D: 3 14 cmï¾²  LVPWd (2D): 1 2 cm  SV (Teich): 42 8 cmï¾³    Apical four chamber  LVEF A4C: 57 %    Unspecified Scan Mode  REYNALDO Cont Eq (Peak Chemo): 2 58 cmï¾²  LVOT Diam : 2 cm  LVOT Vmax: 1290 mm/s  LVOT Vmax; Mean: 1290 mm/s  Peak Grad ; Mean: 7 mm[Hg]  MV Peak A Chemo: 622 mm/s  MV Peak E Hcemo   Mean: 795 mm/s  MVA (PHT): 4 4 cmï¾²  PHT: 50 ms  Max P mm[Hg]  V Max: 2530 mm/s  Vmax: 2250 mm/s  RA Area: 19 5 cmï¾²  RA Volume: 59 7 cmï¾³  TAPSE: 2 1 cm    [employer] Accredited Echocardiography Laboratory    Prepared and electronically signed by    Yossi Razo MD  Signed 23-Aug-2019 16:20:25         Lab Review   Lab Results   Component Value Date    WBC 8 87 10/12/2020    HGB 13 1 10/12/2020    HCT 42 9 10/12/2020    MCV 96 10/12/2020    RDW 13 4 10/12/2020     10/12/2020     BMP:  Lab Results   Component Value Date    SODIUM 140 10/12/2020    K 4 2 10/12/2020     10/12/2020    CO2 28 10/12/2020    BUN 26 (H) 10/12/2020    CREATININE 1 25 10/12/2020    GLUC 108 2019    GLUF 105 (H) 10/12/2020    CALCIUM 10 0 10/12/2020    EGFR 41 10/12/2020    MG 2 2 2016     LFT:  Lab Results   Component Value Date    AST 17 10/12/2020    ALT 23 10/12/2020    ALKPHOS 83 10/12/2020    TP 7 2 10/12/2020    ALB 3 7 10/12/2020      Lab Results   Component Value Date    MJD1OZJNRGAH 2 680 10/12/2020     No results found for: HGBA1C  Lipid Profile:   Lab Results   Component Value Date    CHOLESTEROL 184 10/12/2020     10/12/2020    LDLCALC 61 10/12/2020    TRIG 89 10/12/2020     Lab Results   Component Value Date    CHOLESTEROL 184 10/12/2020    CHOLESTEROL 193 05/10/2019     Lab Results   Component Value Date    CKTOTAL 69 2018    TROPONINI <0 02 2019     Lab Results   Component Value Date    NTBNP 143 2018            Dr Yossi Razo MD [facility]      "This note has been constructed using a voice recognition system  Therefore there may be syntax, spelling, and/or grammatical errors   Please call if you have any questions  "

## 2022-07-30 DIAGNOSIS — I48.0 PAROXYSMAL ATRIAL FIBRILLATION (HCC): ICD-10-CM

## 2022-08-01 RX ORDER — FLECAINIDE ACETATE 50 MG/1
TABLET ORAL
Qty: 180 TABLET | Refills: 3 | Status: SHIPPED | OUTPATIENT
Start: 2022-08-01

## 2022-08-02 ENCOUNTER — TELEPHONE (OUTPATIENT)
Dept: FAMILY MEDICINE CLINIC | Facility: CLINIC | Age: 82
End: 2022-08-02

## 2022-08-11 DIAGNOSIS — I48.0 PAROXYSMAL ATRIAL FIBRILLATION (HCC): ICD-10-CM

## 2022-08-12 RX ORDER — RIVAROXABAN 20 MG/1
TABLET, FILM COATED ORAL
Qty: 90 TABLET | Refills: 3 | Status: SHIPPED | OUTPATIENT
Start: 2022-08-12

## 2022-08-26 ENCOUNTER — RA CDI HCC (OUTPATIENT)
Dept: OTHER | Facility: HOSPITAL | Age: 82
End: 2022-08-26

## 2022-08-26 NOTE — PROGRESS NOTES
[facility]  coding opportunities       Chart reviewed, no opportunity found:   [address]        Patients Insurance     Medicare Insurance: [employer] Advantage

## 2022-09-01 ENCOUNTER — OFFICE VISIT (OUTPATIENT)
Dept: FAMILY MEDICINE CLINIC | Facility: CLINIC | Age: 82
End: 2022-09-01
Payer: COMMERCIAL

## 2022-09-01 VITALS
WEIGHT: 248 LBS | RESPIRATION RATE: 16 BRPM | HEIGHT: 67 IN | DIASTOLIC BLOOD PRESSURE: 68 MMHG | BODY MASS INDEX: 38.92 KG/M2 | OXYGEN SATURATION: 96 % | TEMPERATURE: 98 F | SYSTOLIC BLOOD PRESSURE: 118 MMHG | HEART RATE: 64 BPM

## 2022-09-01 DIAGNOSIS — Z78.0 MENOPAUSE: ICD-10-CM

## 2022-09-01 DIAGNOSIS — Z00.00 MEDICARE ANNUAL WELLNESS VISIT, SUBSEQUENT: Primary | ICD-10-CM

## 2022-09-01 DIAGNOSIS — E78.2 MIXED HYPERLIPIDEMIA: ICD-10-CM

## 2022-09-01 PROCEDURE — 1090F PRES/ABSN URINE INCON ASSESS: CPT | Performed by: INTERNAL MEDICINE

## 2022-09-01 PROCEDURE — 1160F RVW MEDS BY RX/DR IN RCRD: CPT | Performed by: INTERNAL MEDICINE

## 2022-09-01 PROCEDURE — 3074F SYST BP LT 130 MM HG: CPT | Performed by: INTERNAL MEDICINE

## 2022-09-01 PROCEDURE — G0439 PPPS, SUBSEQ VISIT: HCPCS | Performed by: INTERNAL MEDICINE

## 2022-09-01 PROCEDURE — 3725F SCREEN DEPRESSION PERFORMED: CPT | Performed by: INTERNAL MEDICINE

## 2022-09-01 PROCEDURE — 1125F AMNT PAIN NOTED PAIN PRSNT: CPT | Performed by: INTERNAL MEDICINE

## 2022-09-01 PROCEDURE — 3288F FALL RISK ASSESSMENT DOCD: CPT | Performed by: INTERNAL MEDICINE

## 2022-09-01 PROCEDURE — 1170F FXNL STATUS ASSESSED: CPT | Performed by: INTERNAL MEDICINE

## 2022-09-01 PROCEDURE — 3078F DIAST BP <80 MM HG: CPT | Performed by: INTERNAL MEDICINE

## 2022-09-01 NOTE — PROGRESS NOTES
Assessment and Plan:     Problem List Items Addressed This Visit        Other    Menopause    Relevant Orders    DXA bone density spine hip and pelvis    Mixed hyperlipidemia    Relevant Orders    CBC and differential    Comprehensive metabolic panel    Lipid panel      Other Visit Diagnoses     Medicare annual wellness visit, subsequent    -  Primary           Preventive health issues were discussed with patient, and age appropriate screening tests were ordered as noted in patient's After Visit Summary  Personalized health advice and appropriate referrals for health education or preventive services given if needed, as noted in patient's After Visit Summary  History of Present Illness:     Patient presents for a Medicare Wellness Visit    Here for AWV  Patient Care Team:  Monty Lopez MD as PCP - General  MD Monty Morton MD     Review of Systems:     Review of Systems   Constitutional: Negative  HENT: Negative  Eyes: Negative  Respiratory: Negative  Cardiovascular: Negative  Gastrointestinal: Negative  Endocrine: Negative  Genitourinary: Negative  Musculoskeletal: Negative  Skin: Negative  Allergic/Immunologic: Negative  Neurological: Negative  Hematological: Negative  Psychiatric/Behavioral: Negative           Problem List:     Patient Active Problem List   Diagnosis    Allergic rhinitis    Benign hypertension    Gastric ulcer    Bilateral edema of lower extremity    Limited peripheral vision    [facility]    Menopause    Mixed hyperlipidemia    Neoplasm of uncertain behavior of skin    Irritable bowel syndrome    Morbid obesity ([facility]    RAJWINDER (obstructive sleep apnea)    Paroxysmal atrial fibrillation (HCC)    Class 3 obesity    Stage 3 chronic kidney disease, unspecified whether stage 3a or 3b CKD ([facility] )      Past Medical and Surgical History:     Past Medical History:   Diagnosis Date    A-fib ([facility] )     Cancer ([facility] )     Hyperlipidemia     Hypertension     UTI (urinary tract infection)      Past Surgical History:   Procedure Laterality Date    HYSTERECTOMY      JOINT REPLACEMENT        Family History:     Family History   Problem Relation Age of Onset    Alzheimer's disease Mother     Lung cancer Father     Multiple sclerosis Daughter       Social History:     Social History     Socioeconomic History    Marital status: /Civil Union     Spouse name: None    Number of children: None    Years of education: None    Highest education level: None   Occupational History    None   Tobacco Use    Smoking status: Never Smoker    Smokeless tobacco: Never Used   Vaping Use    Vaping Use: Never used   Substance and Sexual Activity    Alcohol use: Not Currently     Comment: rarely    Drug use: No    Sexual activity: None   Other Topics Concern    None   Social History Narrative    None     Social Determinants of Health     Financial Resource Strain: Low Risk     Difficulty of Paying Living Expenses: Not hard at all   Food Insecurity: Not on file   Transportation Needs: No Transportation Needs    Lack of Transportation (Medical): No    Lack of Transportation (Non-Medical):  No   Physical Activity: Not on file   Stress: Not on file   Social Connections: Not on file   Intimate Partner Violence: Not on file   Housing Stability: Not on file      Medications and Allergies:     Current Outpatient Medications   Medication Sig Dispense Refill    atorvastatin (LIPITOR) 40 mg tablet TAKE 1 TABLET BY MOUTH  DAILY 90 tablet 3    b complex vitamins tablet Take 1 tablet by mouth daily      Cholecalciferol (VITAMIN D-3) 1000 units CAPS Take by mouth      flecainide (TAMBOCOR) 50 mg tablet TAKE 1 TABLET BY MOUTH  TWICE DAILY 180 tablet 3    irbesartan (AVAPRO) 300 mg tablet TAKE 1 TABLET BY MOUTH  DAILY 90 tablet 3    metoprolol succinate (TOPROL-XL) 25 mg 24 hr tablet TAKE 1 TABLET BY MOUTH  DAILY 90 tablet 3    Multiple Vitamins-Minerals (CENTRUM ADULTS PO) Take by mouth      permethrin (ELIMITE) 5 % cream       triamterene-hydrochlorothiazide (MAXZIDE-25) 37 5-25 mg per tablet Take 0 5 tablets by mouth daily 45 tablet 3    Xarelto 20 MG tablet TAKE 1 TABLET BY MOUTH  DAILY WITH DINNER 90 tablet 3     No current facility-administered medications for this visit  Allergies   Allergen Reactions    Macrodantin [Nitrofurantoin Macrocrystal] Vomiting    Nitrofurantoin     Oxycodone-Acetaminophen Vomiting    Sulfa Antibiotics       Immunizations:     Immunization History   Administered Date(s) Administered    COVID-19 MODERNA VACC 0 5 ML IM 03/02/2021, 03/30/2021    INFLUENZA 11/01/2018    Influenza Split High Dose Preservative Free IM 11/28/2016    Influenza, high dose seasonal 0 7 mL 09/21/2020, 10/04/2021    Pneumococcal Conjugate 13-Valent 01/01/2016    Pneumococcal Polysaccharide PPV23 01/01/2010    Tdap 05/26/2016    Zoster 01/01/2012      Health Maintenance: There are no preventive care reminders to display for this patient  Topic Date Due    COVID-19 Vaccine (3 - Booster for Moderna series) 08/30/2021    Influenza Vaccine (1) 09/01/2022      Medicare Screening Tests and Risk Assessments:     Mabel Huertas is here for her Subsequent Wellness visit  Health Risk Assessment:   Patient rates overall health as very good  Patient feels that their physical health rating is slightly better  Patient is very satisfied with their life  Eyesight was rated as slightly worse  Hearing was rated as same  Patient feels that their emotional and mental health rating is same  Patients states they are never, rarely angry  Patient states they are sometimes unusually tired/fatigued  Pain experienced in the last 7 days has been some  Patient's pain rating has been 3/10  Patient states that she has experienced no weight loss or gain in last 6 months  Depression Screening:   PHQ-2 Score: 0      Fall Risk Screening:    In the past year, patient has experienced: no history of falling in past year      Urinary Incontinence Screening:   Patient has leaked urine accidently in the last six months  Home Safety:  Patient has trouble with stairs inside or outside of their home  Patient has working smoke alarms and has working carbon monoxide detector  Home safety hazards include: none  Nutrition:   Current diet is Regular and Limited junk food  Medications:   Patient is currently taking over-the-counter supplements  OTC medications include: see medication list  Patient is able to manage medications  Activities of Daily Living (ADLs)/Instrumental Activities of Daily Living (IADLs):   Walk and transfer into and out of bed and chair?: Yes  Dress and groom yourself?: Yes    Bathe or shower yourself?: Yes    Feed yourself?  Yes  Do your laundry/housekeeping?: Yes  Manage your money, pay your bills and track your expenses?: Yes  Make your own meals?: Yes    Do your own shopping?: Yes    Previous Hospitalizations:   Any hospitalizations or ED visits within the last 12 months?: No      Advance Care Planning:   Living will: Yes    Advanced directive: Yes    End of Life Decisions reviewed with patient: Yes      Cognitive Screening:   Provider or family/friend/caregiver concerned regarding cognition?: No    PREVENTIVE SCREENINGS      Cardiovascular Screening:    General: Screening Not Indicated, History Lipid Disorder and Risks and Benefits Discussed    Due for: Lipid Panel      Diabetes Screening:     General: Risks and Benefits Discussed    Due for: Blood Glucose      Colorectal Cancer Screening:     General: Screening Not Indicated      Breast Cancer Screening:     General: Screening Not Indicated      Cervical Cancer Screening:    General: Screening Not Indicated      Osteoporosis Screening:    General: Risks and Benefits Discussed    Due for: DXA Axial      Abdominal Aortic Aneurysm (AAA) Screening:        General: Screening Not Indicated      Lung Cancer Screening:     General: Screening Not Indicated      Hepatitis C Screening:    General: Screening Not Indicated    Screening, Brief Intervention, and Referral to Treatment (SBIRT)    Screening  Typical number of drinks in a day: 0  Typical number of drinks in a week: 0  Interpretation: Low risk drinking behavior  AUDIT-C Screenin) How often did you have a drink containing alcohol in the past year? never  2) How many drinks did you have on a typical day when you were drinking in the past year? 0  3) How often did you have 6 or more drinks on one occasion in the past year? never    AUDIT-C Score: 0  Interpretation: Score 0-2 (female): Negative screen for alcohol misuse    Single Item Drug Screening:  How often have you used an illegal drug (including marijuana) or a prescription medication for non-medical reasons in the past year? never    Single Item Drug Screen Score: 0  Interpretation: Negative screen for possible drug use disorder    No exam data present     Physical Exam:     /68   Pulse 64   Temp 98 °F (36 7 °C)   Resp 16   Ht 5' 7" (1 702 m)   Wt 112 kg (248 lb)   LMP  (LMP Unknown)   SpO2 96%   BMI 38 84 kg/m²     Physical Exam  Constitutional:       General: She is not in acute distress  Appearance: She is well-developed  She is obese  She is not diaphoretic  HENT:      Head: Normocephalic and atraumatic  Right Ear: External ear normal       Left Ear: External ear normal       Nose: Nose normal    Eyes:      Pupils: Pupils are equal, round, and reactive to light  Neck:      Thyroid: No thyromegaly  Vascular: No JVD  Cardiovascular:      Rate and Rhythm: Regular rhythm  Heart sounds: No murmur heard  No friction rub  No gallop  Pulmonary:      Effort: Pulmonary effort is normal       Breath sounds: Normal breath sounds  No wheezing or rales  Abdominal:      General: Bowel sounds are normal  There is no distension        Palpations: Abdomen is soft  Tenderness: There is no abdominal tenderness  Musculoskeletal:         General: Normal range of motion  Cervical back: Normal range of motion and neck supple  Skin:     General: Skin is warm and dry  Findings: No rash  Neurological:      Mental Status: She is oriented to person, place, and time  Cranial Nerves: No cranial nerve deficit  Sensory: No sensory deficit  Motor: No abnormal muscle tone  Coordination: Coordination normal       Deep Tendon Reflexes: Reflexes normal    Psychiatric:         Behavior: Behavior normal          Thought Content:  Thought content normal          Judgment: Judgment normal           Radha Sibley MD

## 2022-09-01 NOTE — PATIENT INSTRUCTIONS
Medicare Preventive Visit Patient Instructions  Thank you for completing your Welcome to Medicare Visit or Medicare Annual Wellness Visit today  Your next wellness visit will be due in one year (9/2/2023)  The screening/preventive services that you may require over the next 5-10 years are detailed below  Some tests may not apply to you based off risk factors and/or age  Screening tests ordered at today's visit but not completed yet may show as past due  Also, please note that scanned in results may not display below  Preventive Screenings:  Service Recommendations Previous Testing/Comments   Colorectal Cancer Screening  * Colonoscopy    * Fecal Occult Blood Test (FOBT)/Fecal Immunochemical Test (FIT)  * Fecal DNA/Cologuard Test  * Flexible Sigmoidoscopy Age: 39-70 years old   Colonoscopy: every 10 years (may be performed more frequently if at higher risk)  OR  FOBT/FIT: every 1 year  OR  Cologuard: every 3 years  OR  Sigmoidoscopy: every 5 years  Screening may be recommended earlier than age 39 if at higher risk for colorectal cancer  Also, an individualized decision between you and your healthcare provider will decide whether screening between the ages of 74-80 would be appropriate  Colonoscopy: Not on file  FOBT/FIT: Not on file  Cologuard: Not on file  Sigmoidoscopy: Not on file          Breast Cancer Screening Age: 36 years old  Frequency: every 1-2 years  Not required if history of left and right mastectomy Mammogram: Not on file        Cervical Cancer Screening Between the ages of 21-29, pap smear recommended once every 3 years  Between the ages of 33-67, can perform pap smear with HPV co-testing every 5 years     Recommendations may differ for women with a history of total hysterectomy, cervical cancer, or abnormal pap smears in past  Pap Smear: Not on file    Screening Not Indicated   Hepatitis C Screening Once for adults born between [facility]  More frequently in patients at high risk for Hepatitis C Hep C Antibody: Not on file        Diabetes Screening 1-2 times per year if you're at risk for diabetes or have pre-diabetes Fasting glucose: 105 mg/dL (10/12/2020)  A1C: No results in last 5 years (No results in last 5 years)      Cholesterol Screening Once every 5 years if you don't have a lipid disorder  May order more often based on risk factors  Lipid panel: 10/12/2020    Screening Not Indicated  History Lipid Disorder     Other Preventive Screenings Covered by Medicare:  1  Abdominal Aortic Aneurysm (AAA) Screening: covered once if your at risk  You're considered to be at risk if you have a family history of AAA  2  Lung Cancer Screening: covers low dose CT scan once per year if you meet all of the following conditions: (1) Age 50-69; (2) No signs or symptoms of lung cancer; (3) Current smoker or have quit smoking within the last 15 years; (4) You have a tobacco smoking history of at least 20 pack years (packs per day multiplied by number of years you smoked); (5) You get a written order from a healthcare provider  3  Glaucoma Screening: covered annually if you're considered high risk: (1) You have diabetes OR (2) Family history of glaucoma OR (3)  aged 48 and older OR (3)  American aged 72 and older  3  Osteoporosis Screening: covered every 2 years if you meet one of the following conditions: (1) You're estrogen deficient and at risk for osteoporosis based off medical history and other findings; (2) Have a vertebral abnormality; (3) On glucocorticoid therapy for more than 3 months; (4) Have primary hyperparathyroidism; (5) On osteoporosis medications and need to assess response to drug therapy  · Last bone density test (DXA Scan): Not on file  5  HIV Screening: covered annually if you're between the age of 12-76  Also covered annually if you are younger than 13 and older than 72 with risk factors for HIV infection   For pregnant patients, it is covered up to 3 times per pregnancy  Immunizations:  Immunization Recommendations   Influenza Vaccine Annual influenza vaccination during flu season is recommended for all persons aged >= 6 months who do not have contraindications   Pneumococcal Vaccine   * Pneumococcal conjugate vaccine = PCV13 (Prevnar 13), PCV15 (Vaxneuvance), PCV20 (Prevnar 20)  * Pneumococcal polysaccharide vaccine = PPSV23 (Pneumovax) Adults 25-60 years old: 1-3 doses may be recommended based on certain risk factors  Adults 72 years old: 1-2 doses may be recommended based off what pneumonia vaccine you previously received   Hepatitis B Vaccine 3 dose series if at intermediate or high risk (ex: diabetes, end stage renal disease, liver disease)   Tetanus (Td) Vaccine - COST NOT COVERED BY MEDICARE PART B Following completion of primary series, a booster dose should be given every 10 years to maintain immunity against tetanus  Td may also be given as tetanus wound prophylaxis  Tdap Vaccine - COST NOT COVERED BY MEDICARE PART B Recommended at least once for all adults  For pregnant patients, recommended with each pregnancy  Shingles Vaccine (Shingrix) - COST NOT COVERED BY MEDICARE PART B  2 shot series recommended in those aged 48 and above     Health Maintenance Due:  There are no preventive care reminders to display for this patient  Immunizations Due:      Topic Date Due    COVID-19 Vaccine (3 - Booster for Moderna series) 08/30/2021    Influenza Vaccine (1) 09/01/2022     Advance Directives   What are advance directives? Advance directives are legal documents that state your wishes and plans for medical care  These plans are made ahead of time in case you lose your ability to make decisions for yourself  Advance directives can apply to any medical decision, such as the treatments you want, and if you want to donate organs  What are the types of advance directives?   There are many types of advance directives, and each state has rules about how to use them  You may choose a combination of any of the following:  · Living will: This is a written record of the treatment you want  You can also choose which treatments you do not want, which to limit, and which to stop at a certain time  This includes surgery, medicine, IV fluid, and tube feedings  · Durable power of  for healthcare [facility]): This is a written record that states who you want to make healthcare choices for you when you are unable to make them for yourself  This person, called a proxy, is usually a family member or a friend  You may choose more than 1 proxy  · Do not resuscitate (DNR) order:  A DNR order is used in case your heart stops beating or you stop breathing  It is a request not to have certain forms of treatment, such as CPR  A DNR order may be included in other types of advance directives  · Medical directive: This covers the care that you want if you are in a coma, near death, or unable to make decisions for yourself  You can list the treatments you want for each condition  Treatment may include pain medicine, surgery, blood transfusions, dialysis, IV or tube feedings, and a ventilator (breathing machine)  · Values history: This document has questions about your views, beliefs, and how you feel and think about life  This information can help others choose the care that you would choose  Why are advance directives important? An advance directive helps you control your care  Although spoken wishes may be used, it is better to have your wishes written down  Spoken wishes can be misunderstood, or not followed  Treatments may be given even if you do not want them  An advance directive may make it easier for your family to make difficult choices about your care  Urinary Incontinence   Urinary incontinence (UI)  is when you lose control of your bladder  UI develops because your bladder cannot store or empty urine properly   The 3 most common types of UI are stress incontinence, urge incontinence, or both  Medicines:   · May be given to help strengthen your bladder control  Report any side effects of medication to your healthcare provider  Do pelvic muscle exercises often:  Your pelvic muscles help you stop urinating  Squeeze these muscles tight for 5 seconds, then relax for 5 seconds  Gradually work up to squeezing for 10 seconds  Do 3 sets of 15 repetitions a day, or as directed  This will help strengthen your pelvic muscles and improve bladder control  Train your bladder:  Go to the bathroom at set times, such as every 2 hours, even if you do not feel the urge to go  You can also try to hold your urine when you feel the urge to go  For example, hold your urine for 5 minutes when you feel the urge to go  As that becomes easier, hold your urine for 10 minutes  Self-care:   · Keep a UI record  Write down how often you leak urine and how much you leak  Make a note of what you were doing when you leaked urine  · Drink liquids as directed  You may need to limit the amount of liquid you drink to help control your urine leakage  Do not drink any liquid right before you go to bed  Limit or do not have drinks that contain caffeine or alcohol  · Prevent constipation  Eat a variety of high-fiber foods  Good examples are high-fiber cereals, beans, vegetables, and whole-grain breads  Walking is the best way to trigger your intestines to have a bowel movement  · Exercise regularly and maintain a healthy weight  Weight loss and exercise will decrease pressure on your bladder and help you control your leakage  · Use a catheter as directed  to help empty your bladder  A catheter is a tiny, plastic tube that is put into your bladder to drain your urine  · Go to behavior therapy as directed  Behavior therapy may be used to help you learn to control your urge to urinate      Weight Management   Why it is important to manage your weight:  Being overweight increases your risk of health conditions such as heart disease, high blood pressure, type 2 diabetes, and certain types of cancer  It can also increase your risk for osteoarthritis, sleep apnea, and other respiratory problems  Aim for a slow, steady weight loss  Even a small amount of weight loss can lower your risk of health problems  How to lose weight safely:  A safe and healthy way to lose weight is to eat fewer calories and get regular exercise  You can lose up about 1 pound a week by decreasing the number of calories you eat by 500 calories each day  Healthy meal plan for weight management:  A healthy meal plan includes a variety of foods, contains fewer calories, and helps you stay healthy  A healthy meal plan includes the following:  · Eat whole-grain foods more often  A healthy meal plan should contain fiber  Fiber is the part of grains, fruits, and vegetables that is not broken down by your body  Whole-grain foods are healthy and provide extra fiber in your diet  Some examples of whole-grain foods are whole-wheat breads and pastas, oatmeal, brown rice, and bulgur  · Eat a variety of vegetables every day  Include dark, leafy greens such as spinach, kale, kel greens, and mustard greens  Eat yellow and orange vegetables such as carrots, sweet potatoes, and winter squash  · Eat a variety of fruits every day  Choose fresh or canned fruit (canned in its own juice or light syrup) instead of juice  Fruit juice has very little or no fiber  · Eat low-fat dairy foods  Drink fat-free (skim) milk or 1% milk  Eat fat-free yogurt and low-fat cottage cheese  Try low-fat cheeses such as mozzarella and other reduced-fat cheeses  · Choose meat and other protein foods that are low in fat  Choose beans or other legumes such as split peas or lentils  Choose fish, skinless poultry (chicken or turkey), or lean cuts of red meat (beef or pork)  Before you cook meat or poultry, cut off any visible fat  · Use less fat and oil    Try baking foods instead of frying them  Add less fat, such as margarine, sour cream, regular salad dressing and mayonnaise to foods  Eat fewer high-fat foods  Some examples of high-fat foods include french fries, doughnuts, ice cream, and cakes  · Eat fewer sweets  Limit foods and drinks that are high in sugar  This includes candy, cookies, regular soda, and sweetened drinks  Exercise:  Exercise at least 30 minutes per day on most days of the week  Some examples of exercise include walking, biking, dancing, and swimming  You can also fit in more physical activity by taking the stairs instead of the elevator or parking farther away from stores  Ask your healthcare provider about the best exercise plan for you  © Copyright "[employer] 2018 Information is for End User's use only and may not be sold, redistributed or otherwise used for commercial purposes   All illustrations and images included in CareNotes® are the copyrighted property of [employer]  or [address] [place] [address]

## 2022-09-23 DIAGNOSIS — I10 BENIGN HYPERTENSION: ICD-10-CM

## 2022-09-26 RX ORDER — IRBESARTAN 300 MG/1
TABLET ORAL
Qty: 90 TABLET | Refills: 3 | Status: SHIPPED | OUTPATIENT
Start: 2022-09-26

## 2022-09-27 ENCOUNTER — TELEPHONE (OUTPATIENT)
Dept: CARDIOLOGY CLINIC | Facility: CLINIC | Age: 82
End: 2022-09-27

## 2022-09-27 NOTE — TELEPHONE ENCOUNTER
Pre  Op  Clearance note- Cardiology    [facility]   80 y o   female  1940    [facility]  F [phone]    Lety Alfredo :     Patient's chart was reviewed for preop clearance  Patient was seen in our office on 06/09/2022  Patient has past medical history significant for CKD, PAF, HTN, and hyperlipidemia  Patient is now scheduled for A2M & PRP injection  Patient has no clinical evidence of  active heart failure or  active ischemia or active arrhythmia  Patient's last cardiac workup including echo, and stress test reports were reviewed and it shows normal LV systolic function mild valvular disease  In my opinion patient is in optimum condition for the procedure as planned  Patient is low risk for the surgery as planned from cardiac point of view  Continue current cardiac medications  Patient can hold  Aspirin for  5-7 days as required for surgery  Patient can hold Plavix for 5 days  Patient can hold Eliquis/Xarelto/Pradaxa for 3 days before the procedure  Please restart after the procedure  immediately or next day if no contraindication form surgical point of view and advise patient to contact our office  If you have any question please do not hesitate to call us at our office of [facility]    Phone # [phone]        Lab Results   Component Value Date    WBC 8 87 10/12/2020    HGB 13 1 10/12/2020    HCT 42 9 10/12/2020    MCV 96 10/12/2020     10/12/2020     Lab Results   Component Value Date    CREATININE 1 25 10/12/2020     Lab Results   Component Value Date    GLUF 105 (H) 10/12/2020       Cardiac testing:   Results for orders placed during the hospital encounter of 08/23/19    Echo complete with contrast if indicated    Narrative  [address]  [place] [address]  [phone]    Transthoracic Echocardiogram  2D, M-mode, Doppler, and Color Doppler    Study date:  23-Aug-2019    Patient: [facility] Anuja Mcguire  MR number: [id number]  Account number: [de-identified]  : 1940  Age: 78 years  Gender: Female  Status: Outpatient  Location: Bedside  Height: 67 in  Weight: 250 6 lb  BP: 152/ 72 mmHg    Indications: A Fib  Diagnoses: I48 0 - Atrial fibrillation    Sonographer:  CHENG Hoff  Primary Physician:  Chuyita Fernando MD  Referring Physician:  Luis Griffith MD  Group:  [facility]  Interpreting Physician:  Luis Griffith MD    SUMMARY    LEFT VENTRICLE:  Systolic function was normal  Ejection fraction was estimated in the range of 55 % to 60 % to be 60 %  There were no regional wall motion abnormalities  Wall thickness was mildly increased  There was mild concentric hypertrophy  RIGHT ATRIUM:  The atrium was mildly dilated  MITRAL VALVE:  There was trace regurgitation  TRICUSPID VALVE:  There was mild regurgitation  Estimated peak PA pressure was 30 mmHg  HISTORY: PRIOR HISTORY: HTN,Hyperlipidemia,A Fib ,Cancer  PROCEDURE: The procedure was performed at the bedside  This was a routine study  The transthoracic approach was used  The study included complete 2D imaging, M-mode, complete spectral Doppler, and color Doppler  The heart rate was 69 bpm,  at the start of the study  Images were obtained from the parasternal, apical, subcostal, and suprasternal notch acoustic windows  Image quality was adequate  LEFT VENTRICLE: Size was normal  Systolic function was normal  Ejection fraction was estimated in the range of 55 % to 60 % to be 60 %  There were no regional wall motion abnormalities  Wall thickness was mildly increased  There was mild  concentric hypertrophy  DOPPLER: Left ventricular diastolic function parameters were normal for the patient's age  RIGHT VENTRICLE: The size was normal  Systolic function was normal     LEFT ATRIUM: Size was normal     RIGHT ATRIUM: The atrium was mildly dilated      MITRAL VALVE: There was normal leaflet separation  DOPPLER: The transmitral velocity was within the normal range  There was no evidence for stenosis  There was trace regurgitation  AORTIC VALVE: The valve was trileaflet  Leaflets exhibited mildly increased thickness and normal cuspal separation  DOPPLER: Transaortic velocity was within the normal range  There was no evidence for stenosis  There was no regurgitation  TRICUSPID VALVE: The valve structure was normal  There was normal leaflet separation  DOPPLER: The transtricuspid velocity was within the normal range  There was no evidence for stenosis  There was mild regurgitation  Estimated peak PA  pressure was 30 mmHg  PULMONIC VALVE: DOPPLER: There was no significant regurgitation  PERICARDIUM: There was no thickening or calcification  There was no pericardial effusion  AORTA: The root exhibited normal size  SYSTEMIC VEINS: IVC: The inferior vena cava was normal in size  Respirophasic changes were normal     SYSTEM MEASUREMENT TABLES    2D mode  AoR Diam 2D: 3 3 cm  LA Diam (2D): 3 9 cm  LA/Ao (2D): 1 18  FS (2D Teich): 28 2 %  IVSd (2D): 1 21 cm  LVDEV: 78 1 cmï¾³  LVESV: 35 3 cmï¾³  LVIDd(2D): 4 19 cm  LVISd (2D): 3 01 cm  LVOT Area 2D: 3 14 cmï¾²  LVPWd (2D): 1 2 cm  SV (Teich): 42 8 cmï¾³    Apical four chamber  LVEF A4C: 57 %    Unspecified Scan Mode  REYNALDO Cont Eq (Peak Chemo): 2 58 cmï¾²  LVOT Diam : 2 cm  LVOT Vmax: 1290 mm/s  LVOT Vmax; Mean: 1290 mm/s  Peak Grad ; Mean: 7 mm[Hg]  MV Peak A Chemo: 622 mm/s  MV Peak E Chemo  Mean: 795 mm/s  MVA (PHT): 4 4 cmï¾²  PHT: 50 ms  Max P mm[Hg]  V Max: 2530 mm/s  Vmax: 2250 mm/s  RA Area: 19 5 cmï¾²  RA Volume: 59 7 cmï¾³  TAPSE: 2 1 cm    [employer] Accredited Echocardi[facility]    Prepared and electronically signed by    Marcel Smith MD  Signed 23-Aug-2019 16:20:25    No results found for this or any previous visit  No results found for this or any previous visit      No results found for this or any previous visit  No results found for this or any previous visit  Results for orders placed during the hospital encounter of 10/09/19    NM myocardial perfusion spect (stress and/or rest)    Narrative  [address]  [address] [facility]  [place] [address] [phone]    Exercise    Patient: Damaris Garcia  MR number: [id number]  Account number: [de-identified]  : 1940  Age: 78 years  Gender: Female  Status: Outpatient  Location: Stress lab  Height: 67 in  Weight: 256 lb  BP: 132/ 74 mmHg    Allergies: NITROFURANTOIN MACROCRYSTAL, NITROFURANTOIN, OXYCODONE-ACETAMINOPHEN, SULFA ANTIBIOTICS    Diagnosis: I10  - Essential (primary) hypertension    Primary Physician:  Rosy Runner, MD  Technician:  Ben Gates  RN:  GUSTAVO Pascal  Referring Physician:  Shauna Soto MD  Group:  [facility]  Report Prepared By[de-identified]  GUSTAVO Pascal  Interpreting Physician:  Shauna Soto MD    INDICATIONS: Evaluation for coronary artery disease  HISTORY: The patient is a 78year old  female  Chest pain status: no chest pain  Coronary artery disease risk factors: dyslipidemia and hypertension  Cardiovascular history: arrhythmia  Co-morbidity: obesity  Medications: an  anticoagulant, a beta blocker, a calcium channel blocker, and a lipid lowering agent  PHYSICAL EXAM: Baseline physical exam screening: normal     REST ECG: Normal sinus rhythm  PROCEDURE: The study was performed in the the Stress lab  Treadmill exercise testing was performed, using the Rangel protocol  Systolic blood pressure was 132 mmHg, at the start of the study  Diastolic blood pressure was 74 mmHg, at the  start of the study  The heart rate was 78 bpm, at the start of the study  IV double checked      RANGEL PROTOCOL:  HR bpm SBP mmHg DBP mmHg Symptoms Rhythm/conduct  Baseline 78 132 74 none NSR  Stage 1 120 148 72 mild fatigue sinus tach  Stage 2 123 -- -- mild dyspnea, severe fatigue --  Immediate 121 166 72 same as above --  Recovery 1 90 146 70 subsiding --  Recovery 2 86 126 72 none --  No medications or fluids given  STRESS SUMMARY: Duration of exercise was 5 min and 51 sec  The patient exercised to protocol stage 2  Maximal work rate was 5 2 METs  Maximal heart rate during stress was 123 bpm ( 87 % of maximal predicted heart rate)  The heart rate  response to stress was normal  There was resting hypertension with an appropriate blood pressure response to stress  The rate-pressure product for the peak heart rate and blood pressure was 40692  There was no chest pain during stress  The  stress test was terminated due to achievement of target heart rate, moderate dyspnea, and severe fatigue  Pre oxygen saturation: 99 %  Peak oxygen saturation: 99 %  The stress ECG was negative for ischemia and normal  There were no stress  arrhythmias or conduction abnormalities  ISOTOPE ADMINISTRATION:  Resting isotope administration Stress isotope administration  Agent Tetrofosmin Tetrofosmin  Dose 16 05 mCi 49 3 mCi  Date 10/09/2019 10/09/2019    Radiopharmaceutical was administered 3 min, 34 sec into the stress protocol  There was 1 min of exercise after the injection  MYOCARDIAL PERFUSION IMAGING:  The image quality was fair  Rotating projection images reveal moderate breast attenuation, mild diaphragmatic attenuation, and mild patient motion  Left ventricular size was normal  The TID ratio was   94  PERFUSION DEFECTS:  -  There was a moderate-sized, mildly severe, fixed myocardial perfusion defect of the basal anterior and anteroseptal wall likely due to attenuation from breast tissue  GATED SPECT:  The calculated left ventricular ejection fraction was 75 %  Left ventricular ejection fraction was within normal limits by visual estimate  There was no left ventricular regional abnormality  SUMMARY:  -  Stress results: Duration of exercise was 5 min and 51 sec  Target heart rate was achieved   There was resting hypertension with an appropriate blood pressure response to stress  There was no chest pain during stress  -  ECG conclusions: The stress ECG was negative for ischemia and normal   -  Perfusion imaging: There was a moderate-sized, mildly severe, fixed myocardial perfusion defect of the basal anterior and anteroseptal wall likely due to attenuation from breast tissue   -  Gated SPECT: The calculated left ventricular ejection fraction was 75 %  Left ventricular ejection fraction was within normal limits by visual estimate  There was no left ventricular regional abnormality  IMPRESSIONS: Probably Normal study after maximal exercise  Basal mostly fixed defect due to body attenuation artifact  Ef 75%  There was image artifact, without diagnostic evidence for perfusion abnormality  Left ventricular systolic  function was normal     Prepared and signed by    Jessy Pearson MD  Signed 10/10/2019 09:58:10    No results found for this or any previous visit  Eileen Hutchison MD [facility]  9/27/2022  11:22 AM      "This note was completed in part utilizing [employer] direct voice recognition software  Grammatical errors, random word insertion, spelling mistakes, and incomplete sentences may be an occasional consequence of the system secondary to software limitations, ambient noise and hardware issues  Please read the chart carefully and recognize, using context, where substitutions have occurred    If you have any questions or concerns about the context, text or information contained within the body of this dictation, please contact myself, the provider, for further clarification "

## 2022-12-07 ENCOUNTER — APPOINTMENT (OUTPATIENT)
Dept: LAB | Facility: HOSPITAL | Age: 82
End: 2022-12-07

## 2022-12-07 DIAGNOSIS — E78.2 MIXED HYPERLIPIDEMIA: ICD-10-CM

## 2022-12-07 LAB
ALBUMIN SERPL BCP-MCNC: 3.5 G/DL (ref 3.5–5)
ALP SERPL-CCNC: 88 U/L (ref 46–116)
ALT SERPL W P-5'-P-CCNC: 29 U/L (ref 12–78)
ANION GAP SERPL CALCULATED.3IONS-SCNC: 4 MMOL/L (ref 4–13)
AST SERPL W P-5'-P-CCNC: 23 U/L (ref 5–45)
BASOPHILS # BLD AUTO: 0.05 THOUSANDS/ÂΜL (ref 0–0.1)
BASOPHILS NFR BLD AUTO: 1 % (ref 0–1)
BILIRUB SERPL-MCNC: 0.66 MG/DL (ref 0.2–1)
BUN SERPL-MCNC: 30 MG/DL (ref 5–25)
CALCIUM SERPL-MCNC: 10 MG/DL (ref 8.3–10.1)
CHLORIDE SERPL-SCNC: 106 MMOL/L (ref 96–108)
CHOLEST SERPL-MCNC: 180 MG/DL
CO2 SERPL-SCNC: 29 MMOL/L (ref 21–32)
CREAT SERPL-MCNC: 1.25 MG/DL (ref 0.6–1.3)
EOSINOPHIL # BLD AUTO: 0.15 THOUSAND/ÂΜL (ref 0–0.61)
EOSINOPHIL NFR BLD AUTO: 2 % (ref 0–6)
ERYTHROCYTE [DISTWIDTH] IN BLOOD BY AUTOMATED COUNT: 13.7 % (ref 11.6–15.1)
GFR SERPL CREATININE-BSD FRML MDRD: 40 ML/MIN/1.73SQ M
GLUCOSE P FAST SERPL-MCNC: 117 MG/DL (ref 65–99)
HCT VFR BLD AUTO: 41.6 % (ref 34.8–46.1)
HDLC SERPL-MCNC: 110 MG/DL
HGB BLD-MCNC: 13.1 G/DL (ref 11.5–15.4)
IMM GRANULOCYTES # BLD AUTO: 0.02 THOUSAND/UL (ref 0–0.2)
IMM GRANULOCYTES NFR BLD AUTO: 0 % (ref 0–2)
LDLC SERPL CALC-MCNC: 58 MG/DL (ref 0–100)
LYMPHOCYTES # BLD AUTO: 3.44 THOUSANDS/ÂΜL (ref 0.6–4.47)
LYMPHOCYTES NFR BLD AUTO: 40 % (ref 14–44)
MCH RBC QN AUTO: 29 PG (ref 26.8–34.3)
MCHC RBC AUTO-ENTMCNC: 31.5 G/DL (ref 31.4–37.4)
MCV RBC AUTO: 92 FL (ref 82–98)
MONOCYTES # BLD AUTO: 0.5 THOUSAND/ÂΜL (ref 0.17–1.22)
MONOCYTES NFR BLD AUTO: 6 % (ref 4–12)
NEUTROPHILS # BLD AUTO: 4.38 THOUSANDS/ÂΜL (ref 1.85–7.62)
NEUTS SEG NFR BLD AUTO: 51 % (ref 43–75)
NONHDLC SERPL-MCNC: 70 MG/DL
NRBC BLD AUTO-RTO: 0 /100 WBCS
PLATELET # BLD AUTO: 221 THOUSANDS/UL (ref 149–390)
PMV BLD AUTO: 9.5 FL (ref 8.9–12.7)
POTASSIUM SERPL-SCNC: 4.4 MMOL/L (ref 3.5–5.3)
PROT SERPL-MCNC: 7 G/DL (ref 6.4–8.4)
RBC # BLD AUTO: 4.51 MILLION/UL (ref 3.81–5.12)
SODIUM SERPL-SCNC: 139 MMOL/L (ref 135–147)
TRIGL SERPL-MCNC: 62 MG/DL
WBC # BLD AUTO: 8.54 THOUSAND/UL (ref 4.31–10.16)

## 2022-12-12 ENCOUNTER — OFFICE VISIT (OUTPATIENT)
Dept: CARDIOLOGY CLINIC | Facility: CLINIC | Age: 82
End: 2022-12-12

## 2022-12-12 VITALS
HEIGHT: 67 IN | WEIGHT: 252 LBS | TEMPERATURE: 97 F | BODY MASS INDEX: 39.55 KG/M2 | SYSTOLIC BLOOD PRESSURE: 128 MMHG | DIASTOLIC BLOOD PRESSURE: 80 MMHG | HEART RATE: 80 BPM | OXYGEN SATURATION: 96 %

## 2022-12-12 DIAGNOSIS — E78.2 MIXED HYPERLIPIDEMIA: ICD-10-CM

## 2022-12-12 DIAGNOSIS — G47.33 OSA (OBSTRUCTIVE SLEEP APNEA): ICD-10-CM

## 2022-12-12 DIAGNOSIS — I10 BENIGN HYPERTENSION: ICD-10-CM

## 2022-12-12 DIAGNOSIS — I48.0 PAROXYSMAL ATRIAL FIBRILLATION (HCC): ICD-10-CM

## 2022-12-12 DIAGNOSIS — N18.30 STAGE 3 CHRONIC KIDNEY DISEASE, UNSPECIFIED WHETHER STAGE 3A OR 3B CKD (HCC): ICD-10-CM

## 2022-12-12 DIAGNOSIS — E66.01 CLASS 3 OBESITY (HCC): ICD-10-CM

## 2022-12-12 DIAGNOSIS — R60.0 BILATERAL EDEMA OF LOWER EXTREMITY: ICD-10-CM

## 2022-12-12 NOTE — PROGRESS NOTES
Progress Note - Cardiology Office  [facility]    Verenice Alfredo 80 y o  female MRN: [id number]  : 1940  Encounter: [id number]      Assessment:     1  Paroxysmal atrial fibrillation (HCC)    2  Benign hypertension    3  Mixed hyperlipidemia    4  Stage 3 chronic kidney disease, unspecified whether stage 3a or 3b CKD (HCC)    5  Class 3 obesity with BMI around 42    6  RAJWINDER (obstructive sleep apnea)    7  Bilateral edema of lower extremity        Discussion Summary and Plan:  1  Paroxysmal atrial fibrillation  Patient is staying in sinus rhythm  She has no reoccurrence  Continue Xarelto and continue flecainide  No more episodes of atrial fibrillation  Continue current medications  Blood test on 2022 acceptable  2  Essential hypertension  Her blood pressure is now much better  In fact sometime on the lower side  She had a blood test done in April in [facility] she will get us a copy  She occasionally feel dizziness because her BMI now around 37  Her blood pressure is better with the half dose  And she is not dizzy and creatinine is 1 25       3  Dyslipidemia  Continue statins  She had a good HDL     4  Obesity with BMI around now 3 9 as she has gained some weight back    5  Obstructive sleep apnea  History of sleep apnea but could not tolerate CPAP machine    6  Bilateral lower extremity edema  No more leg edema  Current medications seem to be helping her  7  History of meningioma    Continue current Rx  Follow-up in 6 months she is tolerating her Xarelto, metoprolol XL and flecainide very well  Will check her flecainide level and BMP  Please call [phone] if any questions  Counseling :  A description of the counseling  Goals and Barriers  Patient's ability to self care: Yes  Medication side effect reviewed with patient in detail and all their questions answered to their satisfaction      HPI :     Trevor Pérez is a 80y o  year old female who came for follow up  Patient was recently admitted to [facility] with palpitations and rapid heartbeat and was found to be in AFib with rapid ventricular rate  She has a past medical history significant for essential hypertension, obesity with BMI around 39, dyslipidemia, previous history of atrial fibrillation but not on antithrombotic therapy as she was getting brief episodes, who noted she went into AFib earlier that morning and was sent to the emergency room  Patient spontaneously converted back to sinus rhythm and was started on antithrombotic therapy and added low-dose metoprolol  Currently she came for follow-up  She did have some rash she is not sure it related to Toprol XL or Xarelto but it has slowly improved     At this time she denies any chest pain any shortness of breath  She has some chronic shortness of breath which is not changed  No nausea no vomiting no PND no orthopnea no palpitations at all  She does monitor heart rate regularly at generally is around 70 beats per minute  11/18/2021  Above reviewed  Patient came for follow-up  She has medical history significant for paroxysmal atrial fibrillation, essential hypertension, dyslipidemia, obesity with BMI around 41, history of knee replacement surgery who came for follow-up  She also history of recurrent UTI  She has previous history of atrial fibrillation when she had UTIs she spontaneously converted back to regular rhythm  He was started on low-dose flecainide since then she has no further episodes of atrial fibrillation  She is on antithrombotic therapy with Xarelto and she is compliant with her cholesterol medications  Her blood pressure is well controlled with current therapy  Recently she was diagnosed to have eczema she also had sleep study which she failed and now she needs CPAP machine  She gets anxious about it and that has been affecting her quality of life    She will discussed with primary care doctor may need to see a psychologist   She is also very anxious about her knees passing away who had a brain aneurysm  06/09/2022  Above reviewed  Patient came for follow-up she is feeling well she has lost about 21 lb her BMI now around 37 she feels better  She had a medical history significant for paroxysmal atrial fibrillation, essential hypertension, dyslipidemia, class 2 obesity and history of knee replacement surgery  She also history of recurrent UTI and she has been doing well  Every time she had reoccurrence of atrial fibrillation it happen when she has a UTI  She was started on low-dose flecainide since then she has no more episode of atrial fibrillation  She is on antithrombotic therapy with Xarelto and she has been compliant with her vitals has been stable today EKG shows sinus rhythm heart rate 60 beats per minute first-degree AV block nonspecific ST changes no nausea no vomiting no fever no chills no other issues at this time  Since she has lost weight she is feeling some dizziness lightheadedness occasionally blood pressure even dropping up to systolic of 120    93/[id number]/9779  Above reviewed  Patient came for follow-up she is doing well  She has lost some weight her BMI now 39 as she has gained back some of it  She had history of paroxysmal atrial fibrillation suppressed with low-dose flecainide, hypertension, dyslipidemia, obesity and history of knee replacement surgery  She had a history of recurrent UTI but she is doing well  Since she has flecainide she is maintaining sinus rhythm and she is on Xarelto for antithrombotic therapy  Today heart rate is 80 bpm and EKG shows no changes from previous EKG  No nausea no vomiting no fever no chills no PND no orthopnea no other cardiovascular issues  She is doing well from cardiac point of view her main problem is knee pain      Review of Systems   Constitutional: Negative for activity change, chills, diaphoresis, fever and unexpected weight change  HENT: Negative for congestion  Eyes: Negative for discharge and redness  Respiratory: Negative for cough, chest tightness, shortness of breath and wheezing  Cardiovascular: Negative  Negative for chest pain, palpitations and leg swelling  Gastrointestinal: Negative for abdominal pain, diarrhea and nausea  Endocrine: Negative  Genitourinary: Negative for decreased urine volume and urgency  Musculoskeletal: Positive for arthralgias and gait problem  Negative for back pain  Skin: Negative for rash and wound  Allergic/Immunologic: Negative  Neurological: Negative for dizziness, seizures, syncope, weakness, light-headedness and headaches  Hematological: Negative  Psychiatric/Behavioral: Negative for agitation and confusion  The patient is nervous/anxious          Historical Information   Past Medical History:   Diagnosis Date   • A-fib ([facility] )    • Cancer ([facility] )    • Hyperlipidemia    • Hypertension    • UTI (urinary tract infection)      Past Surgical History:   Procedure Laterality Date   • HYSTERECTOMY     • JOINT REPLACEMENT       Social History     Substance and Sexual Activity   Alcohol Use Not Currently    Comment: rarely     Social History     Substance and Sexual Activity   Drug Use No     Social History     Tobacco Use   Smoking Status Never   Smokeless Tobacco Never     Family History:   Family History   Problem Relation Age of Onset   • Alzheimer's disease Mother    • Lung cancer Father    • Multiple sclerosis Daughter        Meds/Allergies     Allergies   Allergen Reactions   • Macrodantin [Nitrofurantoin Macrocrystal] Vomiting   • Nitrofurantoin    • Oxycodone-Acetaminophen Vomiting   • Sulfa Antibiotics        Current Outpatient Medications:   •  atorvastatin (LIPITOR) 40 mg tablet, TAKE 1 TABLET BY MOUTH  DAILY, Disp: 90 tablet, Rfl: 3  •  b complex vitamins tablet, Take 1 tablet by mouth daily, Disp: , Rfl:   •  Cholecalciferol (VITAMIN D-3) 1000 units CAPS, Take by mouth, Disp: , Rfl:   •  flecainide (TAMBOCOR) 50 mg tablet, TAKE 1 TABLET BY MOUTH  TWICE DAILY, Disp: 180 tablet, Rfl: 3  •  irbesartan (AVAPRO) 300 mg tablet, TAKE 1 TABLET BY MOUTH  DAILY, Disp: 90 tablet, Rfl: 3  •  metoprolol succinate (TOPROL-XL) 25 mg 24 hr tablet, TAKE 1 TABLET BY MOUTH  DAILY, Disp: 90 tablet, Rfl: 3  •  Multiple Vitamins-Minerals (CENTRUM ADULTS PO), Take by mouth, Disp: , Rfl:   •  triamterene-hydrochlorothiazide (MAXZIDE-25) 37 5-25 mg per tablet, Take 0 5 tablets by mouth daily, Disp: 45 tablet, Rfl: 3  •  Xarelto 20 MG tablet, TAKE 1 TABLET BY MOUTH  DAILY WITH DINNER, Disp: 90 tablet, Rfl: 3  •  permethrin (ELIMITE) 5 % cream, , Disp: , Rfl:     Vitals: Blood pressure 128/80, pulse 80, temperature (!) 97 °F (36 1 °C), height 5' 7" (1 702 m), weight 114 kg (252 lb), SpO2 96 %  ?  Body mass index is 39 47 kg/m²  Vitals:    12/12/22 1333   Weight: 114 kg (252 lb)     BP Readings from Last 3 Encounters:   12/12/22 128/80   09/01/22 118/68   06/09/22 118/72         Physical Exam  Constitutional:       General: She is not in acute distress  Appearance: She is well-developed  She is not diaphoretic  Neck:      Thyroid: No thyromegaly  Vascular: No JVD  Trachea: No tracheal deviation  Cardiovascular:      Rate and Rhythm: Normal rate and regular rhythm  Heart sounds: S1 normal and S2 normal  Heart sounds not distant  Murmur heard  Systolic (ejection) murmur is present with a grade of 2/6  No friction rub  No gallop  No S3 or S4 sounds  Pulmonary:      Effort: Pulmonary effort is normal  No respiratory distress  Breath sounds: Normal breath sounds  No wheezing or rales  Chest:      Chest wall: No tenderness  Abdominal:      General: Bowel sounds are normal  There is no distension  Palpations: Abdomen is soft  Tenderness: There is no abdominal tenderness  Musculoskeletal:         General: No deformity        Cervical back: Neck supple  Skin:     General: Skin is warm and dry  Coloration: Skin is not pale  Findings: No rash  Neurological:      Mental Status: She is alert and oriented to person, place, and time  Psychiatric:         Behavior: Behavior normal          Judgment: Judgment normal          Diagnostic Studies Review Cardio:    Echo Doppler  Echo Doppler done 08/23/2019 shows EF 60%, left atrium size was normal, right atrium mildly dilated, no significant valvular disease  Nuclear stress test   Nuclear stress test done 10/09/2019 was probably normal   Exercised for 5 minutes and 51 seconds  EF was 75%  No perfusion abnormality was noted  There was imaged artifact noted  EKG:  Twelve lead EKG done 09/25/2019 shows normal sinus rhythm heart rate 70 beats per minute  Twelve lead EKG done today 10/16/2019 shows normal sinus rhythm heart rate 74 beats per minute  Twelve lead EKG 12/06/2019 shows normal sinus rhythm heart rate 86 beats per minute  She is staying in sinus rhythm normal intervals  Twelve lead EKG done 06/09/2020 shows atrial flutter with heart rate 94 beats per minute  Nonspecific ST changes  As compared to previous EKG patient is now in atrial flutter  Twelve lead EKG done 10/28/2020 shows sinus rhythm first-degree AV block heart rate 78 beats per minute  As compared to previous EKG patient is in sinus rhythm  Twelve lead EKG 05/18/2021 shows sinus rhythm first-degree AV block heart rate 69 beats per minute  12 lead EKG done on 11/18/2021 shows normal sinus rhythm first-degree AV block heart rate 62 beats per minute nonspecific ST changes QTC interval is acceptable  12 lead EKG 06/09/2022 shows sinus rhythm first-degree AV block heart rate 60 beats per minute nonspecific ST  Changes      Twelve-lead EKG done 12/12/2022 shows normal sinus unfortunately block heart rate 80 bpm   No change from previous EKG    Cardiac testing:   Results for orders placed during the hospital encounter of 19   Echo complete with contrast if indicated    Narrative [address]  [address] [facility]  [place] [phone]    Transthoracic Echocardiogram  2D, M-mode, Doppler, and Color Doppler    Study date:  23-Aug-2019    Patient: [facility]  MR number: [id number]  Account number: [de-identified]  : 1940  Age: 78 years  Gender: Female  Status: Outpatient  Location: Bedside  Height: 67 in  Weight: 250 6 lb  BP: 152/ 72 mmHg    Indications: A Fib  Diagnoses: I48 0 - Atrial fibrillation    Sonographer:  CHENG Woodall  Primary Physician:  Genevieve Travis MD  Referring Physician:  Shima Sheets MD  Group:  [facility]  Interpreting Physician:  Shima Sheets MD    SUMMARY    LEFT VENTRICLE:  Systolic function was normal  Ejection fraction was estimated in the range of 55 % to 60 % to be 60 %  There were no regional wall motion abnormalities  Wall thickness was mildly increased  There was mild concentric hypertrophy  RIGHT ATRIUM:  The atrium was mildly dilated  MITRAL VALVE:  There was trace regurgitation  TRICUSPID VALVE:  There was mild regurgitation  Estimated peak PA pressure was 30 mmHg  HISTORY: PRIOR HISTORY: HTN,Hyperlipidemia,A Fib ,Cancer  PROCEDURE: The procedure was performed at the bedside  This was a routine study  The transthoracic approach was used  The study included complete 2D imaging, M-mode, complete spectral Doppler, and color Doppler  The heart rate was 69 bpm,  at the start of the study  Images were obtained from the parasternal, apical, subcostal, and suprasternal notch acoustic windows  Image quality was adequate  LEFT VENTRICLE: Size was normal  Systolic function was normal  Ejection fraction was estimated in the range of 55 % to 60 % to be 60 %  There were no regional wall motion abnormalities  Wall thickness was mildly increased   There was mild  concentric hypertrophy  DOPPLER: Left ventricular diastolic function parameters were normal for the patient's age  RIGHT VENTRICLE: The size was normal  Systolic function was normal     LEFT ATRIUM: Size was normal     RIGHT ATRIUM: The atrium was mildly dilated  MITRAL VALVE: There was normal leaflet separation  DOPPLER: The transmitral velocity was within the normal range  There was no evidence for stenosis  There was trace regurgitation  AORTIC VALVE: The valve was trileaflet  Leaflets exhibited mildly increased thickness and normal cuspal separation  DOPPLER: Transaortic velocity was within the normal range  There was no evidence for stenosis  There was no regurgitation  TRICUSPID VALVE: The valve structure was normal  There was normal leaflet separation  DOPPLER: The transtricuspid velocity was within the normal range  There was no evidence for stenosis  There was mild regurgitation  Estimated peak PA  pressure was 30 mmHg  PULMONIC VALVE: DOPPLER: There was no significant regurgitation  PERICARDIUM: There was no thickening or calcification  There was no pericardial effusion  AORTA: The root exhibited normal size  SYSTEMIC VEINS: IVC: The inferior vena cava was normal in size  Respirophasic changes were normal     SYSTEM MEASUREMENT TABLES    2D mode  AoR Diam 2D: 3 3 cm  LA Diam (2D): 3 9 cm  LA/Ao (2D): 1 18  FS (2D Teich): 28 2 %  IVSd (2D): 1 21 cm  LVDEV: 78 1 cmï¾³  LVESV: 35 3 cmï¾³  LVIDd(2D): 4 19 cm  LVISd (2D): 3 01 cm  LVOT Area 2D: 3 14 cmï¾²  LVPWd (2D): 1 2 cm  SV (Teich): 42 8 cmï¾³    Apical four chamber  LVEF A4C: 57 %    Unspecified Scan Mode  REYNALDO Cont Eq (Peak Chemo): 2 58 cmï¾²  LVOT Diam : 2 cm  LVOT Vmax: 1290 mm/s  LVOT Vmax; Mean: 1290 mm/s  Peak Grad ; Mean: 7 mm[Hg]  MV Peak A Chemo: 622 mm/s  MV Peak E Chemo   Mean: 795 mm/s  MVA (PHT): 4 4 cmï¾²  PHT: 50 ms  Max P mm[Hg]  V Max: 2530 mm/s  Vmax: 2250 mm/s  RA Area: 19 5 cmï¾²  RA Volume: 59 7 cmï¾³  TAPSE: 2 1 cm    [employer] Accredited Echocardiography Laboratory    Prepared and electronically signed by    Francisco Hogue MD  Signed 23-Aug-2019 16:20:25         Lab Review   Lab Results   Component Value Date    WBC 8 54 12/07/2022    HGB 13 1 12/07/2022    HCT 41 6 12/07/2022    MCV 92 12/07/2022    RDW 13 7 12/07/2022     12/07/2022     BMP:  Lab Results   Component Value Date    SODIUM 139 12/07/2022    K 4 4 12/07/2022     12/07/2022    CO2 29 12/07/2022    BUN 30 (H) 12/07/2022    CREATININE 1 25 12/07/2022    GLUC 108 08/24/2019    GLUF 117 (H) 12/07/2022    CALCIUM 10 0 12/07/2022    EGFR 40 12/07/2022    MG 2 2 07/17/2016     LFT:  Lab Results   Component Value Date    AST 23 12/07/2022    ALT 29 12/07/2022    ALKPHOS 88 12/07/2022    TP 7 0 12/07/2022    ALB 3 5 12/07/2022      Lab Results   Component Value Date    [id number] 2 680 10/12/2020     No results found for: HGBA1C  Lipid Profile:   Lab Results   Component Value Date    CHOLESTEROL 180 12/07/2022     12/07/2022    LDLCALC 58 12/07/2022    TRIG 62 12/07/2022     Lab Results   Component Value Date    CHOLESTEROL 180 12/07/2022    CHOLESTEROL 184 10/12/2020     Lab Results   Component Value Date    CKTOTAL 69 04/17/2018    TROPONINI <0 02 08/23/2019     Lab Results   Component Value Date    NTBNP 143 04/17/2018            Dr Francisco Hogue MD [facility]      "This note has been constructed using a voice recognition system  Therefore there may be syntax, spelling, and/or grammatical errors   Please call if you have any questions  "

## 2023-02-17 DIAGNOSIS — E78.2 MIXED HYPERLIPIDEMIA: ICD-10-CM

## 2023-02-20 RX ORDER — ATORVASTATIN CALCIUM 40 MG/1
TABLET, FILM COATED ORAL
Qty: 90 TABLET | Refills: 3 | Status: SHIPPED | OUTPATIENT
Start: 2023-02-20

## 2023-04-23 DIAGNOSIS — I48.0 PAROXYSMAL ATRIAL FIBRILLATION (HCC): ICD-10-CM

## 2023-04-24 RX ORDER — METOPROLOL SUCCINATE 25 MG/1
TABLET, EXTENDED RELEASE ORAL
Qty: 90 TABLET | Refills: 3 | Status: SHIPPED | OUTPATIENT
Start: 2023-04-24

## 2023-06-08 ENCOUNTER — OFFICE VISIT (OUTPATIENT)
Dept: CARDIOLOGY CLINIC | Facility: CLINIC | Age: 83
End: 2023-06-08
Payer: COMMERCIAL

## 2023-06-08 VITALS
WEIGHT: 259 LBS | BODY MASS INDEX: 40.65 KG/M2 | HEART RATE: 79 BPM | SYSTOLIC BLOOD PRESSURE: 130 MMHG | HEIGHT: 67 IN | DIASTOLIC BLOOD PRESSURE: 70 MMHG | OXYGEN SATURATION: 97 %

## 2023-06-08 DIAGNOSIS — I48.0 PAROXYSMAL ATRIAL FIBRILLATION (HCC): ICD-10-CM

## 2023-06-08 DIAGNOSIS — E78.2 MIXED HYPERLIPIDEMIA: ICD-10-CM

## 2023-06-08 DIAGNOSIS — E66.01 CLASS 3 OBESITY (HCC): ICD-10-CM

## 2023-06-08 DIAGNOSIS — I10 BENIGN HYPERTENSION: ICD-10-CM

## 2023-06-08 DIAGNOSIS — N18.30 STAGE 3 CHRONIC KIDNEY DISEASE, UNSPECIFIED WHETHER STAGE 3A OR 3B CKD (HCC): ICD-10-CM

## 2023-06-08 DIAGNOSIS — R60.0 BILATERAL EDEMA OF LOWER EXTREMITY: ICD-10-CM

## 2023-06-08 DIAGNOSIS — G47.33 OSA (OBSTRUCTIVE SLEEP APNEA): ICD-10-CM

## 2023-06-08 PROCEDURE — 93000 ELECTROCARDIOGRAM COMPLETE: CPT | Performed by: INTERNAL MEDICINE

## 2023-06-08 PROCEDURE — 99214 OFFICE O/P EST MOD 30 MIN: CPT | Performed by: INTERNAL MEDICINE

## 2023-06-08 RX ORDER — TRIAMTERENE AND HYDROCHLOROTHIAZIDE 37.5; 25 MG/1; MG/1
TABLET ORAL
Qty: 45 TABLET | Refills: 3 | Status: SHIPPED | OUTPATIENT
Start: 2023-06-08 | End: 2023-06-14

## 2023-06-08 NOTE — PROGRESS NOTES
Progress Note - Cardiology Office  [facility]    Ricky Alfredo 80 y o  female MRN: [id number]  : 1940  Encounter: [id number]      Assessment:     1  Paroxysmal atrial fibrillation (HCC)    2  Benign hypertension    3  Mixed hyperlipidemia    4  Stage 3 chronic kidney disease, unspecified whether stage 3a or 3b CKD ([address] )    5  RAJWINDER (obstructive sleep apnea)    6  Class 3 obesity with BMI around 42    7  Bilateral edema of lower extremity        Discussion Summary and Plan:  1  Paroxysmal atrial fibrillation  Patient is staying in sinus rhythm  She has no reoccurrence  Continue Xarelto and continue flecainide  No further episodes of palpitations  Continue current medications  Lab from 2022 reviewed  2  Essential hypertension  Her blood pressure is now much better  In fact sometime on the lower side  She had a blood test done in April in [facility] she will get us a copy  She occasionally feel dizziness because her BMI now around 37  Blood pressure is better with the half dose  She is not dizzy  Creatinine is 1 25       3  Dyslipidemia  Continue statins  She had a good HDL tolerating medication well     4  Obesity with BMI around now 3  Encouraged her to lose weight  5  Obstructive sleep apnea  History of sleep apnea but could not tolerate CPAP machine discussed with patient    6  Bilateral lower extremity edema  No more leg edema  Current medications seem to be helping her  7  History of meningioma no current issues    Continue current Rx  She is tolerating her medications well  Follow-up 6 months  Please call [phone] if any questions  Counseling :  A description of the counseling  Goals and Barriers  Patient's ability to self care: Yes  Medication side effect reviewed with patient in detail and all their questions answered to their satisfaction      HPI :     Luis Eduardo Leslie is a 80y o  year old female who came for follow up  Patient was recently admitted to [facility] with palpitations and rapid heartbeat and was found to be in AFib with rapid ventricular rate  She has a past medical history significant for essential hypertension, obesity with BMI around 39, dyslipidemia, previous history of atrial fibrillation but not on antithrombotic therapy as she was getting brief episodes, who noted she went into AFib earlier that morning and was sent to the emergency room  Patient spontaneously converted back to sinus rhythm and was started on antithrombotic therapy and added low-dose metoprolol  Currently she came for follow-up  She did have some rash she is not sure it related to Toprol XL or Xarelto but it has slowly improved     At this time she denies any chest pain any shortness of breath  She has some chronic shortness of breath which is not changed  No nausea no vomiting no PND no orthopnea no palpitations at all  She does monitor heart rate regularly at generally is around 70 beats per minute  11/18/2021  Above reviewed  Patient came for follow-up  She has medical history significant for paroxysmal atrial fibrillation, essential hypertension, dyslipidemia, obesity with BMI around 41, history of knee replacement surgery who came for follow-up  She also history of recurrent UTI  She has previous history of atrial fibrillation when she had UTIs she spontaneously converted back to regular rhythm  He was started on low-dose flecainide since then she has no further episodes of atrial fibrillation  She is on antithrombotic therapy with Xarelto and she is compliant with her cholesterol medications  Her blood pressure is well controlled with current therapy  Recently she was diagnosed to have eczema she also had sleep study which she failed and now she needs CPAP machine  She gets anxious about it and that has been affecting her quality of life    She will discussed with primary care doctor may need to see a psychologist   She is also very anxious about her knees passing away who had a brain aneurysm  06/09/2022  Above reviewed  Patient came for follow-up she is feeling well she has lost about 21 lb her BMI now around 37 she feels better  She had a medical history significant for paroxysmal atrial fibrillation, essential hypertension, dyslipidemia, class 2 obesity and history of knee replacement surgery  She also history of recurrent UTI and she has been doing well  Every time she had reoccurrence of atrial fibrillation it happen when she has a UTI  She was started on low-dose flecainide since then she has no more episode of atrial fibrillation  She is on antithrombotic therapy with Xarelto and she has been compliant with her vitals has been stable today EKG shows sinus rhythm heart rate 60 beats per minute first-degree AV block nonspecific ST changes no nausea no vomiting no fever no chills no other issues at this time  Since she has lost weight she is feeling some dizziness lightheadedness occasionally blood pressure even dropping up to systolic of 624    07/66/2005  Above reviewed  Patient came for follow-up she is doing well  She has lost some weight her BMI now 39 as she has gained back some of it  She had history of paroxysmal atrial fibrillation suppressed with low-dose flecainide, hypertension, dyslipidemia, obesity and history of knee replacement surgery  She had a history of recurrent UTI but she is doing well  Since she has flecainide she is maintaining sinus rhythm and she is on Xarelto for antithrombotic therapy  Today heart rate is 80 bpm and EKG shows no changes from previous EKG  No nausea no vomiting no fever no chills no PND no orthopnea no other cardiovascular issues  She is doing well from cardiac point of view her main problem is knee pain  6/8/2023  Above reviewed  Patient came for follow-up she is doing well  Her main issue is her back pain    She has a back injection which is not helping much  She has no new cardiovascular symptoms no palpitations EKG shows sinus some heart rate 79 bpm and her blood test from December 2022 are acceptable Labs are also reviewed from them  No other cardiovascular issues  Review of Systems   Constitutional: Negative for activity change, chills, diaphoresis, fever and unexpected weight change  HENT: Negative for congestion  Eyes: Negative for discharge and redness  Respiratory: Negative for cough, chest tightness, shortness of breath and wheezing  Cardiovascular: Negative  Negative for chest pain, palpitations and leg swelling  Gastrointestinal: Negative for abdominal pain, diarrhea and nausea  Endocrine: Negative  Genitourinary: Negative for decreased urine volume and urgency  Musculoskeletal: Positive for arthralgias and gait problem  Negative for back pain  Skin: Negative for rash and wound  Allergic/Immunologic: Negative  Neurological: Negative for dizziness, seizures, syncope, weakness, light-headedness and headaches  Hematological: Negative  Psychiatric/Behavioral: Negative for agitation and confusion  The patient is nervous/anxious          Historical Information   Past Medical History:   Diagnosis Date   • A-fib ([facility] )    • Cancer ([facility] )    • Hyperlipidemia    • Hypertension    • UTI (urinary tract infection)      Past Surgical History:   Procedure Laterality Date   • HYSTERECTOMY     • JOINT REPLACEMENT       Social History     Substance and Sexual Activity   Alcohol Use Not Currently    Comment: rarely     Social History     Substance and Sexual Activity   Drug Use No     Social History     Tobacco Use   Smoking Status Never   Smokeless Tobacco Never     Family History:   Family History   Problem Relation Age of Onset   • Alzheimer's disease Mother    • Lung cancer Father    • Multiple sclerosis Daughter        Meds/Allergies     Allergies   Allergen Reactions   • Macrodantin [Nitrofurantoin "Macrocrystal] Vomiting   • Nitrofurantoin    • Oxycodone-Acetaminophen Vomiting   • Sulfa Antibiotics        Current Outpatient Medications:   •  atorvastatin (LIPITOR) 40 mg tablet, TAKE 1 TABLET BY MOUTH  DAILY, Disp: 90 tablet, Rfl: 3  •  b complex vitamins tablet, Take 1 tablet by mouth daily, Disp: , Rfl:   •  Cholecalciferol (VITAMIN D-3) 1000 units CAPS, Take by mouth, Disp: , Rfl:   •  flecainide (TAMBOCOR) 50 mg tablet, TAKE 1 TABLET BY MOUTH  TWICE DAILY, Disp: 180 tablet, Rfl: 3  •  irbesartan (AVAPRO) 300 mg tablet, TAKE 1 TABLET BY MOUTH  DAILY, Disp: 90 tablet, Rfl: 3  •  metoprolol succinate (TOPROL-XL) 25 mg 24 hr tablet, TAKE 1 TABLET BY MOUTH  DAILY, Disp: 90 tablet, Rfl: 3  •  Multiple Vitamins-Minerals (CENTRUM ADULTS PO), Take by mouth, Disp: , Rfl:   •  triamterene-hydrochlorothiazide (MAXZIDE-25) 37 5-25 mg per tablet, TAKE ONE-HALF TABLET BY  MOUTH DAILY, Disp: 45 tablet, Rfl: 3  •  Xarelto 20 MG tablet, TAKE 1 TABLET BY MOUTH  DAILY WITH DINNER, Disp: 90 tablet, Rfl: 3  •  permethrin (ELIMITE) 5 % cream, , Disp: , Rfl:     Vitals: Blood pressure 130/70, pulse 79, height 5' 7\" (1 702 m), weight 117 kg (259 lb), SpO2 97 %  ?  Body mass index is 40 57 kg/m²  Vitals:    06/08/23 1312   Weight: 117 kg (259 lb)     BP Readings from Last 3 Encounters:   06/08/23 130/70   12/12/22 128/80   09/01/22 118/68         Physical Exam  Constitutional:       General: She is not in acute distress  Appearance: She is well-developed  She is not diaphoretic  Neck:      Thyroid: No thyromegaly  Vascular: No JVD  Trachea: No tracheal deviation  Cardiovascular:      Rate and Rhythm: Normal rate and regular rhythm  Heart sounds: S1 normal and S2 normal  Heart sounds not distant  Murmur heard  Systolic (ejection) murmur is present with a grade of 2/6  No friction rub  No gallop  No S3 or S4 sounds  Pulmonary:      Effort: Pulmonary effort is normal  No respiratory distress        " Breath sounds: Normal breath sounds  No wheezing or rales  Chest:      Chest wall: No tenderness  Abdominal:      General: Bowel sounds are normal  There is no distension  Palpations: Abdomen is soft  Tenderness: There is no abdominal tenderness  Musculoskeletal:         General: No deformity  Cervical back: Neck supple  Skin:     General: Skin is warm and dry  Coloration: Skin is not pale  Findings: No rash  Neurological:      Mental Status: She is alert and oriented to person, place, and time  Psychiatric:         Behavior: Behavior normal          Judgment: Judgment normal          Diagnostic Studies Review Cardio:    Echo Doppler  Echo Doppler done 08/23/2019 shows EF 60%, left atrium size was normal, right atrium mildly dilated, no significant valvular disease  Nuclear stress test   Nuclear stress test done 10/09/2019 was probably normal   Exercised for 5 minutes and 51 seconds  EF was 75%  No perfusion abnormality was noted  There was imaged artifact noted  EKG:  Twelve lead EKG done 09/25/2019 shows normal sinus rhythm heart rate 70 beats per minute  Twelve lead EKG done today 10/16/2019 shows normal sinus rhythm heart rate 74 beats per minute  Twelve lead EKG 12/06/2019 shows normal sinus rhythm heart rate 86 beats per minute  She is staying in sinus rhythm normal intervals  Twelve lead EKG done 06/09/2020 shows atrial flutter with heart rate 94 beats per minute  Nonspecific ST changes  As compared to previous EKG patient is now in atrial flutter  Twelve lead EKG done 10/28/2020 shows sinus rhythm first-degree AV block heart rate 78 beats per minute  As compared to previous EKG patient is in sinus rhythm  Twelve lead EKG 05/18/2021 shows sinus rhythm first-degree AV block heart rate 69 beats per minute       12 lead EKG done on 11/18/2021 shows normal sinus rhythm first-degree AV block heart rate 62 beats per minute nonspecific ST changes QTC interval is acceptable  12 lead EKG 2022 shows sinus rhythm first-degree AV block heart rate 60 beats per minute nonspecific ST  Changes  Twelve-lead EKG done 2022 shows normal sinus with a first-degree block heart rate 80 bpm   No change from previous EKG    Twelve-lead EKG done on 2023 shows normal sinus first-degree block heart rate 79 bpm    Cardiac testing:   Results for orders placed during the hospital encounter of 19   Echo complete with contrast if indicated    Narrative [address]  [address] [facility]  Carlie Espinoza   [phone]    Transthoracic Echocardiogram  2D, M-mode, Doppler, and Color Doppler    Study date:  23-Aug-2019    Patient: [facility]  MR number: [id number]  Account number: [de-identified]  : 1940  Age: 78 years  Gender: Female  Status: Outpatient  Location: Bedside  Height: 67 in  Weight: 250 6 lb  BP: 152/ 72 mmHg    Indications: A Fib  Diagnoses: I48 0 - Atrial fibrillation    Sonographer:  CHENG Nuno Res  Primary Physician:  Karrie Ruth MD  Referring Physician:  Korey Urbano MD  Group:  [id number]  Interpreting Physician:  Korey Urbano MD    SUMMARY    LEFT VENTRICLE:  Systolic function was normal  Ejection fraction was estimated in the range of 55 % to 60 % to be 60 %  There were no regional wall motion abnormalities  Wall thickness was mildly increased  There was mild concentric hypertrophy  RIGHT ATRIUM:  The atrium was mildly dilated  MITRAL VALVE:  There was trace regurgitation  TRICUSPID VALVE:  There was mild regurgitation  Estimated peak PA pressure was 30 mmHg  HISTORY: PRIOR HISTORY: HTN,Hyperlipidemia,A Fib ,Cancer  PROCEDURE: The procedure was performed at the bedside  This was a routine study  The transthoracic approach was used  The study included complete 2D imaging, M-mode, complete spectral Doppler, and color Doppler   The heart rate was 69 bpm,  at the start of the study  Images were obtained from the parasternal, apical, subcostal, and suprasternal notch acoustic windows  Image quality was adequate  LEFT VENTRICLE: Size was normal  Systolic function was normal  Ejection fraction was estimated in the range of 55 % to 60 % to be 60 %  There were no regional wall motion abnormalities  Wall thickness was mildly increased  There was mild  concentric hypertrophy  DOPPLER: Left ventricular diastolic function parameters were normal for the patient's age  RIGHT VENTRICLE: The size was normal  Systolic function was normal     LEFT ATRIUM: Size was normal     RIGHT ATRIUM: The atrium was mildly dilated  MITRAL VALVE: There was normal leaflet separation  DOPPLER: The transmitral velocity was within the normal range  There was no evidence for stenosis  There was trace regurgitation  AORTIC VALVE: The valve was trileaflet  Leaflets exhibited mildly increased thickness and normal cuspal separation  DOPPLER: Transaortic velocity was within the normal range  There was no evidence for stenosis  There was no regurgitation  TRICUSPID VALVE: The valve structure was normal  There was normal leaflet separation  DOPPLER: The transtricuspid velocity was within the normal range  There was no evidence for stenosis  There was mild regurgitation  Estimated peak PA  pressure was 30 mmHg  PULMONIC VALVE: DOPPLER: There was no significant regurgitation  PERICARDIUM: There was no thickening or calcification  There was no pericardial effusion  AORTA: The root exhibited normal size  SYSTEMIC VEINS: IVC: The inferior vena cava was normal in size   Respirophasic changes were normal     SYSTEM MEASUREMENT TABLES    2D mode  AoR Diam 2D: 3 3 cm  LA Diam (2D): 3 9 cm  LA/Ao (2D): 1 18  FS (2D Teich): 28 2 %  IVSd (2D): 1 21 cm  LVDEV: 78 1 cmï¾³  LVESV: 35 3 cmï¾³  LVIDd(2D): 4 19 cm  LVISd (2D): 3 01 cm  LVOT Area 2D: 3 14 cmï¾²  LVPWd (2D): 1 2 cm  SV "(Teich): 42 8 cmï¾³    Apical four chamber  LVEF A4C: 57 %    Unspecified Scan Mode  REYNALDO Cont Eq (Peak Chemo): 2 58 cmï¾²  LVOT Diam : 2 cm  LVOT Vmax: 1290 mm/s  LVOT Vmax; Mean: 1290 mm/s  Peak Grad ; Mean: 7 mm[Hg]  MV Peak A Chemo: 622 mm/s  MV Peak E Chemo  Mean: 795 mm/s  MVA (PHT): 4 4 cmï¾²  PHT: 50 ms  Max P mm[Hg]  V Max: 2530 mm/s  Vmax: 2250 mm/s  RA Area: 19 5 cmï¾²  RA Volume: 59 7 cmï¾³  TAPSE: 2 1 cm    [employer] Accredited Echocardiography Laboratory    Prepared and electronically signed by    Meghan Lee MD  Signed 23-Aug-2019 16:20:25         Lab Review   Lab Results   Component Value Date    HCT 41 6 2022    HGB 13 1 2022    MCV 92 2022     2022    RDW 13 7 2022    WBC 8 54 2022     BMP:  Lab Results   Component Value Date    BUN 30 (H) 2022    CALCIUM 10 0 2022     2022    CO2 29 2022    CREATININE 1 25 2022    EGFR 40 2022    GLUC 108 2019    GLUF 117 (H) 2022    K 4 4 2022    MG 2 2 2016    SODIUM 139 2022     LFT:  Lab Results   Component Value Date    ALB 3 5 2022    ALKPHOS 88 2022    ALT 29 2022    AST 23 2022    TP 7 0 2022      Lab Results   Component Value Date    VPM0SMSNYIZP 2 680 10/12/2020     No results found for: \"HGBA1C\"  Lipid Profile:   Lab Results   Component Value Date    CHOLESTEROL 180 2022     2022    LDLCALC 58 2022    TRIG 62 2022     Lab Results   Component Value Date    CHOLESTEROL 180 2022    CHOLESTEROL 184 10/12/2020     Lab Results   Component Value Date    CKTOTAL 69 2018    TROPONINI <0 02 2019     Lab Results   Component Value Date    NTBNP 143 2018            Dr Meghan Lee MD [facility]      \"This note has been constructed using a voice recognition system  Therefore there may be syntax, spelling, and/or grammatical errors   Please call if you have " "any questions   \"  "

## 2023-06-11 DIAGNOSIS — I10 BENIGN HYPERTENSION: ICD-10-CM

## 2023-06-11 DIAGNOSIS — R60.0 BILATERAL EDEMA OF LOWER EXTREMITY: ICD-10-CM

## 2023-06-14 RX ORDER — TRIAMTERENE AND HYDROCHLOROTHIAZIDE 37.5; 25 MG/1; MG/1
TABLET ORAL
Qty: 45 TABLET | Refills: 3 | Status: SHIPPED | OUTPATIENT
Start: 2023-06-14

## 2023-07-05 DIAGNOSIS — I48.0 PAROXYSMAL ATRIAL FIBRILLATION (HCC): ICD-10-CM

## 2023-07-06 RX ORDER — FLECAINIDE ACETATE 50 MG/1
TABLET ORAL
Qty: 180 TABLET | Refills: 3 | Status: SHIPPED | OUTPATIENT
Start: 2023-07-06

## 2023-07-18 DIAGNOSIS — I48.0 PAROXYSMAL ATRIAL FIBRILLATION (HCC): ICD-10-CM

## 2023-07-18 RX ORDER — RIVAROXABAN 20 MG/1
TABLET, FILM COATED ORAL
Qty: 90 TABLET | Refills: 3 | Status: SHIPPED | OUTPATIENT
Start: 2023-07-18

## 2023-08-18 ENCOUNTER — HOSPITAL ENCOUNTER (OUTPATIENT)
Dept: RADIOLOGY | Facility: HOSPITAL | Age: 83
Discharge: HOME | End: 2023-08-18
Payer: COMMERCIAL

## 2023-08-18 DIAGNOSIS — M54.16 LUMBAR RADICULOPATHY: ICD-10-CM

## 2023-08-18 PROCEDURE — 72110 X-RAY EXAM L-2 SPINE 4/>VWS: CPT

## 2023-08-29 DIAGNOSIS — I10 BENIGN HYPERTENSION: ICD-10-CM

## 2023-08-29 RX ORDER — IRBESARTAN 300 MG/1
TABLET ORAL
Qty: 90 TABLET | Refills: 3 | Status: SHIPPED | OUTPATIENT
Start: 2023-08-29

## 2023-09-07 ENCOUNTER — TELEPHONE (OUTPATIENT)
Dept: CARDIOLOGY CLINIC | Facility: CLINIC | Age: 83
End: 2023-09-07

## 2023-09-07 NOTE — TELEPHONE ENCOUNTER
Pre. Op. Clearance note- Cardiology    [facility]   80 y.o.  female  1940      Dr. Alejandra ZENG - Shreya Moran :     Patient's chart was reviewed for preop clearance. Patient was seen in our office on 06/08/2023. Patient has past medical history significant for Paroxysmal atrial fibrillation, benign hypertension, mixed hyperlipidemia, RAJWINDER. Patient is now scheduled for an advanced pain procedure. Patient has no clinical evidence of  active heart failure or  active ischemia or active arrhythmia. Patient's last cardiac workup including nuclear stress test and echo Doppler done in 2/2019 and August 2019 reports were reviewed and it shows no ischemia normal LV solid function mild valvular disease. In my opinion patient is in optimum condition for the procedure as planned. Patient is low risk for the surgery as planned from cardiac point of view. Continue current cardiac medications. Patient can hold Xarelto for 3 days before the procedure. Please restart after the procedure  immediately or next day if no contraindication form surgical point of view and advise patient to contact our office. If you have any question please do not hesitate to call us at our office of [facility].   Phone # [phone]        Lab Results   Component Value Date    WBC 8.54 12/07/2022    HGB 13.1 12/07/2022    HCT 41.6 12/07/2022    MCV 92 12/07/2022     12/07/2022     Lab Results   Component Value Date    CREATININE 1.25 12/07/2022     Lab Results   Component Value Date    GLUF 117 (H) 12/07/2022       Cardiac testing:   Results for orders placed during the hospital encounter of 08/23/19    Echo complete with contrast if indicated    Narrative  [address] [place] [address]. [facility].  [address]  [phone]    Transthoracic Echocardiogram  2D, M-mode, Doppler, and Color Doppler    Study date:  23-Aug-2019    Patient: [facility]  MR number: [id number]  Account number: [de-identified]  : 1940  Age: 78 years  Gender: Female  Status: Outpatient  Location: Bedside  Height: 67 in  Weight: 250.6 lb  BP: 152/ 72 mmHg    Indications: A.Fib. Diagnoses: I48.0 - Atrial fibrillation    Sonographer:  CHENG Wilkinson  Primary Physician:  Ani Montes De Oca MD  Referring Physician:  Evelia Sewell MD  Group:  [facility]  Interpreting Physician:  Evelia Sewell MD    SUMMARY    LEFT VENTRICLE:  Systolic function was normal. Ejection fraction was estimated in the range of 55 % to 60 % to be 60 %. There were no regional wall motion abnormalities. Wall thickness was mildly increased. There was mild concentric hypertrophy. RIGHT ATRIUM:  The atrium was mildly dilated. MITRAL VALVE:  There was trace regurgitation. TRICUSPID VALVE:  There was mild regurgitation. Estimated peak PA pressure was 30 mmHg. HISTORY: PRIOR HISTORY: HTN,Hyperlipidemia,A.Fib.,Cancer. PROCEDURE: The procedure was performed at the bedside. This was a routine study. The transthoracic approach was used. The study included complete 2D imaging, M-mode, complete spectral Doppler, and color Doppler. The heart rate was 69 bpm,  at the start of the study. Images were obtained from the parasternal, apical, subcostal, and suprasternal notch acoustic windows. Image quality was adequate. LEFT VENTRICLE: Size was normal. Systolic function was normal. Ejection fraction was estimated in the range of 55 % to 60 % to be 60 %. There were no regional wall motion abnormalities. Wall thickness was mildly increased. There was mild  concentric hypertrophy. DOPPLER: Left ventricular diastolic function parameters were normal for the patient's age. RIGHT VENTRICLE: The size was normal. Systolic function was normal.    LEFT ATRIUM: Size was normal.    RIGHT ATRIUM: The atrium was mildly dilated.     MITRAL VALVE: There was normal leaflet separation. DOPPLER: The transmitral velocity was within the normal range. There was no evidence for stenosis. There was trace regurgitation. AORTIC VALVE: The valve was trileaflet. Leaflets exhibited mildly increased thickness and normal cuspal separation. DOPPLER: Transaortic velocity was within the normal range. There was no evidence for stenosis. There was no regurgitation. TRICUSPID VALVE: The valve structure was normal. There was normal leaflet separation. DOPPLER: The transtricuspid velocity was within the normal range. There was no evidence for stenosis. There was mild regurgitation. Estimated peak PA  pressure was 30 mmHg. PULMONIC VALVE: DOPPLER: There was no significant regurgitation. PERICARDIUM: There was no thickening or calcification. There was no pericardial effusion. AORTA: The root exhibited normal size. SYSTEMIC VEINS: IVC: The inferior vena cava was normal in size. Respirophasic changes were normal.    SYSTEM MEASUREMENT TABLES    2D mode  AoR Diam 2D: 3.3 cm  LA Diam (2D): 3.9 cm  LA/Ao (2D): 1.18  FS (2D Teich): 28.2 %  IVSd (2D): 1.21 cm  LVDEV: 78.1 cmï¾³  LVESV: 35.3 cmï¾³  LVIDd(2D): 4.19 cm  LVISd (2D): 3.01 cm  LVOT Area 2D: 3.14 cmï¾²  LVPWd (2D): 1.2 cm  SV (Teich): 42.8 cmï¾³    Apical four chamber  LVEF A4C: 57 %    Unspecified Scan Mode  REYNALDO Cont Eq (Peak Chemo): 2.58 cmï¾²  LVOT Diam.: 2 cm  LVOT Vmax: 1290 mm/s  LVOT Vmax; Mean: 1290 mm/s  Peak Grad.; Mean: 7 mm[Hg]  MV Peak A Chemo: 622 mm/s  MV Peak E Chemo. Mean: 795 mm/s  MVA (PHT): 4.4 cmï¾²  PHT: 50 ms  Max P mm[Hg]  V Max: 2530 mm/s  Vmax: 2250 mm/s  RA Area: 19.5 cmï¾²  RA Volume: 59.7 cmï¾³  TAPSE: 2.1 cm    [employer] Accredited Echocardiography Laboratory    Prepared and electronically signed by    Flaquito Rubio MD  Signed 23-Aug-2019 16:20:25    No results found for this or any previous visit. No results found for this or any previous visit.     No results found for this or any previous visit. No results found for this or any previous visit. Results for orders placed during the hospital encounter of 10/09/19    NM myocardial perfusion spect (stress and/or rest)    Narrative  [address] [place] [address].  [place] [address] [address]  [phone]    Exercise    Patient: Yimi Jackson  MR number: [id number]  Account number: [de-identified]  : 1940  Age: 78 years  Gender: Female  Status: Outpatient  Location: Stress lab  Height: 67 in  Weight: 256 lb  BP: 132/ 74 mmHg    Allergies: NITROFURANTOIN MACROCRYSTAL, NITROFURANTOIN, OXYCODONE-ACETAMINOPHEN, SULFA ANTIBIOTICS    Diagnosis: I10. - Essential (primary) hypertension    Primary Physician:  Hernan Lopez MD  Technician:  Gabriela Hairston  RN:  GUSTAVO Guzman  Referring Physician:  Marcio Baltazar MD  Group:  [facility]  Report Prepared By[de-identified]  GUSTAVO Guzman  Interpreting Physician:  Marcio Baltazar MD    INDICATIONS: Evaluation for coronary artery disease. HISTORY: The patient is a 78year old  female. Chest pain status: no chest pain. Coronary artery disease risk factors: dyslipidemia and hypertension. Cardiovascular history: arrhythmia. Co-morbidity: obesity. Medications: an  anticoagulant, a beta blocker, a calcium channel blocker, and a lipid lowering agent. PHYSICAL EXAM: Baseline physical exam screening: normal.    REST ECG: Normal sinus rhythm. PROCEDURE: The study was performed in the the Stress lab. Treadmill exercise testing was performed, using the Rangel protocol. Systolic blood pressure was 132 mmHg, at the start of the study. Diastolic blood pressure was 74 mmHg, at the  start of the study. The heart rate was 78 bpm, at the start of the study. IV double checked.     RANGEL PROTOCOL:  HR bpm SBP mmHg DBP mmHg Symptoms Rhythm/conduct  Baseline 78 132 74 none NSR  Stage 1 120 148 72 mild fatigue sinus tach  Stage 2 123 -- -- mild dyspnea, severe fatigue --  Immediate 121 166 72 same as above --  Recovery 1 90 146 70 subsiding --  Recovery 2 86 126 72 none --  No medications or fluids given. STRESS SUMMARY: Duration of exercise was 5 min and 51 sec. The patient exercised to protocol stage 2. Maximal work rate was 5.2 METs. Maximal heart rate during stress was 123 bpm ( 87 % of maximal predicted heart rate). The heart rate  response to stress was normal. There was resting hypertension with an appropriate blood pressure response to stress. The rate-pressure product for the peak heart rate and blood pressure was 26888. There was no chest pain during stress. The  stress test was terminated due to achievement of target heart rate, moderate dyspnea, and severe fatigue. Pre oxygen saturation: 99 %. Peak oxygen saturation: 99 %. The stress ECG was negative for ischemia and normal. There were no stress  arrhythmias or conduction abnormalities. ISOTOPE ADMINISTRATION:  Resting isotope administration Stress isotope administration  Agent Tetrofosmin Tetrofosmin  Dose 16.05 mCi 49.3 mCi  Date 10/09/2019 10/09/2019    Radiopharmaceutical was administered 3 min, 34 sec into the stress protocol. There was 1 min of exercise after the injection. MYOCARDIAL PERFUSION IMAGING:  The image quality was fair. Rotating projection images reveal moderate breast attenuation, mild diaphragmatic attenuation, and mild patient motion. Left ventricular size was normal. The TID ratio was . 94. PERFUSION DEFECTS:  -  There was a moderate-sized, mildly severe, fixed myocardial perfusion defect of the basal anterior and anteroseptal wall likely due to attenuation from breast tissue. GATED SPECT:  The calculated left ventricular ejection fraction was 75 %. Left ventricular ejection fraction was within normal limits by visual estimate. There was no left ventricular regional abnormality. SUMMARY:  -  Stress results: Duration of exercise was 5 min and 51 sec. Target heart rate was achieved.  There was resting hypertension with an appropriate blood pressure response to stress. There was no chest pain during stress. -  ECG conclusions: The stress ECG was negative for ischemia and normal.  -  Perfusion imaging: There was a moderate-sized, mildly severe, fixed myocardial perfusion defect of the basal anterior and anteroseptal wall likely due to attenuation from breast tissue.  -  Gated SPECT: The calculated left ventricular ejection fraction was 75 %. Left ventricular ejection fraction was within normal limits by visual estimate. There was no left ventricular regional abnormality. IMPRESSIONS: Probably Normal study after maximal exercise. Basal mostly fixed defect due to body attenuation artifact. Ef 75%. There was image artifact, without diagnostic evidence for perfusion abnormality. Left ventricular systolic  function was normal.    Prepared and signed by    Dulce Garcia MD  Signed 10/10/2019 09:58:10    No results found for this or any previous visit. Azul Hill MD [facility]  9/7/2023  11:41 AM      "This note was completed in part utilizing [employer] direct voice recognition software. Grammatical errors, random word insertion, spelling mistakes, and incomplete sentences may be an occasional consequence of the system secondary to software limitations, ambient noise and hardware issues. Please read the chart carefully and recognize, using context, where substitutions have occurred.   If you have any questions or concerns about the context, text or information contained within the body of this dictation, please contact myself, the provider, for further clarification."

## 2023-09-11 NOTE — TELEPHONE ENCOUNTER
Dr Trevor Agosto, The office doing the procedure needs the clearance form completed by today or they will need to reschedule it.

## 2023-10-12 ENCOUNTER — TELEPHONE (OUTPATIENT)
Dept: FAMILY MEDICINE CLINIC | Facility: CLINIC | Age: 83
End: 2023-10-12

## 2023-12-03 ENCOUNTER — NURSE TRIAGE (OUTPATIENT)
Dept: OTHER | Facility: OTHER | Age: 83
End: 2023-12-03

## 2023-12-03 NOTE — TELEPHONE ENCOUNTER
Started with cough, congestion and fatigue on Friday. Home covid test was positive today. Patient denies fever, CP or SOB. Denies significant medical history. Requesting script for Paxlovid to be sent to [facility] if possible.      On call provider notified  Reason for Disposition   HIGH RISK for severe COVID complications (e.g., weak immune system, age > 59 years, obesity with BMI > 22, pregnant, chronic lung disease or other chronic medical condition)  (Exception: Already seen by PCP and no new or worsening symptoms.)    Answer Assessment - Initial Assessment Questions  Were you within 6 feet or less, for up to 15 minutes or more with a person that has a confirmed COVID-19 test? unsure  What was the date of your exposure? unsure  Are you experiencing any symptoms attributed to the virus?  (Assess for SOB, cough, fever, difficulty breathing) cough, congestion, fatigue  HIGH RISK: Do you have any history heart or lung conditions, weakened immune system, diabetes, Asthma, CHF, HIV, COPD, Chemo, renal failure, sickle cell, etc? denies    Protocols used: Coronavirus (COVID-19) Diagnosed or Suspected-ADULT-

## 2023-12-03 NOTE — TELEPHONE ENCOUNTER
Regarding: covid positive/paxlovid request/coughing/congested/vomiting  ----- Message from Redmond Pratima sent at 12/3/2023  6:36 PM EST -----  Pt's daughter stated, "My mother tested positive for COVID. She is congested and has a bad cough. She is coughing to where she is throwing up.  I would like to request Paxlovid for her."

## 2023-12-04 ENCOUNTER — OFFICE VISIT (OUTPATIENT)
Dept: FAMILY MEDICINE CLINIC | Facility: CLINIC | Age: 83
End: 2023-12-04
Payer: COMMERCIAL

## 2023-12-04 ENCOUNTER — TELEPHONE (OUTPATIENT)
Age: 83
End: 2023-12-04

## 2023-12-04 VITALS
DIASTOLIC BLOOD PRESSURE: 80 MMHG | SYSTOLIC BLOOD PRESSURE: 128 MMHG | HEART RATE: 97 BPM | RESPIRATION RATE: 20 BRPM | TEMPERATURE: 99.2 F

## 2023-12-04 DIAGNOSIS — U07.1 COVID: Primary | ICD-10-CM

## 2023-12-04 DIAGNOSIS — D32.9 MENINGIOMA (HCC): ICD-10-CM

## 2023-12-04 PROBLEM — H53.459: Status: RESOLVED | Noted: 2017-09-14 | Resolved: 2023-12-04

## 2023-12-04 PROBLEM — M46.1 SACROILIITIS, NOT ELSEWHERE CLASSIFIED (HCC): Status: ACTIVE | Noted: 2023-12-04

## 2023-12-04 PROCEDURE — 99213 OFFICE O/P EST LOW 20 MIN: CPT | Performed by: INTERNAL MEDICINE

## 2023-12-04 RX ORDER — BENZONATATE 200 MG/1
200 CAPSULE ORAL 3 TIMES DAILY PRN
Qty: 20 CAPSULE | Refills: 0 | Status: SHIPPED | OUTPATIENT
Start: 2023-12-04

## 2023-12-04 RX ORDER — FLUTICASONE PROPIONATE 50 MCG
1 BLISTER, WITH INHALATION DEVICE INHALATION 2 TIMES DAILY
Qty: 60 BLISTER | Refills: 0 | Status: SHIPPED | OUTPATIENT
Start: 2023-12-04 | End: 2024-01-03

## 2023-12-04 NOTE — TELEPHONE ENCOUNTER
Jose Galvin is wondering when she should start the inhaler. Should she start it today or only if her symptoms start to get worse?  Please advise

## 2023-12-04 NOTE — TELEPHONE ENCOUNTER
Per on call- patient should be evaluated by PCP on Monday.      Patient was scheduled with PCP for 12/4 at 11:45

## 2023-12-04 NOTE — PROGRESS NOTES
COVID-19 Outpatient Progress Note    Assessment/Plan:    Problem List Items Addressed This Visit        Nervous and Auditory    Meningioma (HCC)     No changes. Other Visit Diagnoses     COVID    -  Primary    Paxlovid contraindicated due to use of flecainide and xarelto. Will treat symptomatically, supportive care discussed. Relevant Medications    benzonatate (TESSALON) 200 MG capsule    fluticasone (Flovent Diskus) 50 mcg/actuation diskus inhaler           Disposition:     Patient with moderate or severe COVID-19. They should isolate from others through at least day 10. Isolation can be ended if symptoms are improving and they are fever free for the past 24 hours. If they still have fever or other symptoms have not improved, continue to isolate until they improve. Regardless of when you isolation is ended, avoid being around people who are more likely to get very sick from COVID-19 until at least day 11. Patient's with severe COVID-19 may need to isolate up to 20 days from symptom onset. Discussed symptom directed medication options with patient. Discussed vitamin D, vitamin C, and/or zinc supplementation with patient. Steroid inhaler and cough medication given. I have spent a total time of 15 minutes on the day of the encounter for this patient including discussing prognosis and risks and benefits of treatment options. Encounter provider: Gricelda Daniels MD     Provider located at: [address] [place] [phone]     Recent Visits  No visits were found meeting these conditions. Showing recent visits within past 7 days and meeting all other requirements  Today's Visits  Date Type Provider Dept   12/04/23 Office Visit Gricelda Daniels MD [facility] today's visits and meeting all other requirements  Future Appointments  No visits were found meeting these conditions.   Showing future appointments within next 150 days and meeting all other requirements     Subjective:   Brayden Woodard is a 80 y.o. female who is concerned about COVID-19. Patient's symptoms include chills, fatigue, malaise, nasal congestion, cough, nausea, diarrhea and myalgias. Patient denies shortness of breath and chest tightness.     - Date of symptom onset: 12/1/2023      COVID-19 vaccination status: Fully vaccinated (primary series)    Tested positive 12/3/23  Taking mucinex with minimal relief. No results found for: "SARSCOV2", "[facility]", "[address] [facility]", "CORONAVIRUSR", "[address]", "[address] [facility]"    Review of Systems   Constitutional:  Positive for chills and fatigue. HENT:  Positive for congestion. Respiratory:  Positive for cough. Negative for chest tightness and shortness of breath. Gastrointestinal:  Positive for diarrhea and nausea. Musculoskeletal:  Positive for myalgias. Current Outpatient Medications on File Prior to Visit   Medication Sig   • atorvastatin (LIPITOR) 40 mg tablet TAKE 1 TABLET BY MOUTH  DAILY   • b complex vitamins tablet Take 1 tablet by mouth daily   • Cholecalciferol (VITAMIN D-3) 1000 units CAPS Take by mouth   • flecainide (TAMBOCOR) 50 mg tablet TAKE 1 TABLET BY MOUTH  TWICE DAILY   • irbesartan (AVAPRO) 300 mg tablet TAKE 1 TABLET BY MOUTH  DAILY   • metoprolol succinate (TOPROL-XL) 25 mg 24 hr tablet TAKE 1 TABLET BY MOUTH  DAILY   • Multiple Vitamins-Minerals (CENTRUM ADULTS PO) Take by mouth   • triamterene-hydrochlorothiazide (MAXZIDE-25) 37.5-25 mg per tablet TAKE ONE-HALF TABLET BY  MOUTH DAILY   • Xarelto 20 MG tablet TAKE 1 TABLET BY MOUTH  DAILY WITH DINNER   • [DISCONTINUED] permethrin (ELIMITE) 5 % cream        Objective:    /80   Pulse 97   Temp 99.2 °F (37.3 °C)   Resp 20   LMP  (LMP Unknown)        Physical Exam  Constitutional:       Appearance: Normal appearance. She is ill-appearing. HENT:      Head: Normocephalic and atraumatic.       Right Ear: Tympanic membrane normal. Left Ear: Tympanic membrane normal.      Nose: Congestion present. Mouth/Throat:      Mouth: Mucous membranes are moist.   Cardiovascular:      Rate and Rhythm: Normal rate and regular rhythm. Heart sounds: No murmur heard. No friction rub. No gallop. Pulmonary:      Effort: Pulmonary effort is normal. No respiratory distress. Breath sounds: No wheezing. Neurological:      Mental Status: She is alert.        Garrett Paez MD

## 2023-12-13 ENCOUNTER — NURSE TRIAGE (OUTPATIENT)
Age: 83
End: 2023-12-13

## 2023-12-13 NOTE — TELEPHONE ENCOUNTER
Regarding: Covid patient  ----- Message from Yajaira Brito sent at 12/13/2023  2:35 PM EST -----  Patient states is on 12 th day of being positive for covid. Exhaustion, dizziness, no appetite (eats because has to), when gets up to walk around a bit gets really tired and equilibrium is off. Cough is minimal. No fever.

## 2023-12-13 NOTE — TELEPHONE ENCOUNTER
Patient tested positive for covid on 12/04. She state she is still having symptoms: dizziness, fatigue, loss of appetite. Patient denies fever, cp, denies trouble breathing. Patient advised to increase fluids at this time. Asked patient if she would like an appointment she state she would like to speak with provider. Please follow up. Reason for Disposition   [1] Caller has URGENT question AND [2] triager unable to answer question    Answer Assessment - Initial Assessment Questions  1. COVID-19 ONSET: "When did the symptoms of COVID-19 first start?"      12/03    2. DIAGNOSIS CONFIRMATION: "How were you diagnosed?" (e.g., COVID-19 oral or nasal viral test; COVID-19 antibody test; doctor visit)        3. MAIN SYMPTOM:  "What is your main concern or symptom right now?" (e.g., breathing difficulty, cough, fatigue. loss of smell)      Dizziness    4. SYMPTOM ONSET: "When did the symptoms start?"        5. BETTER-SAME-WORSE: "Are you getting better, staying the same, or getting worse over the last 1 to 2 weeks?"  Staying the same        6. RECENT MEDICAL VISIT: "Have you been seen by a healthcare provider (doctor, NP, PA) for these persisting COVID-19 symptoms?" If Yes, ask: "When were you seen?" (e.g., date)      12/04    7. COUGH: "Do you have a cough?" If Yes, ask: "How bad is the cough?"        Denies    8. FEVER: "Do you have a fever?" If Yes, ask: "What is your temperature, how was it measured, and when did it start?"      Denies    9.  BREATHING DIFFICULTY: "Are you having any trouble breathing?" If Yes, ask: "How bad is your breathing?" (e.g., mild, moderate, severe)     - MILD: No SOB at rest, mild SOB with walking, speaks normally in sentences, can lie down, no retractions, pulse < 100.     - MODERATE: SOB at rest, SOB with minimal exertion and prefers to sit, cannot lie down flat, speaks in phrases, mild retractions, audible wheezing, pulse 100-120.     - SEVERE: Very SOB at rest, speaks in single words, struggling to breathe, sitting hunched forward, retractions, pulse > 120        Denies    10. HIGH RISK DISEASE: "Do you have any chronic medical problems?" (e.g., asthma, heart or lung disease, weak immune system, obesity, etc.)           13. PREGNANCY: "Is there any chance you are pregnant?" "When was your last menstrual period?"        N/A    14.  OTHER SYMPTOMS: "Do you have any other symptoms?"  (e.g., fatigue, headache, muscle pain, weakness)        Fatigue, loss of appetite    Protocols used: Coronavirus (COVID-19) Persisting Symptoms Follow-up Call-[facility]

## 2023-12-14 ENCOUNTER — OFFICE VISIT (OUTPATIENT)
Dept: FAMILY MEDICINE CLINIC | Facility: CLINIC | Age: 83
End: 2023-12-14
Payer: COMMERCIAL

## 2023-12-14 ENCOUNTER — HOSPITAL ENCOUNTER (OUTPATIENT)
Dept: RADIOLOGY | Facility: HOSPITAL | Age: 83
Discharge: HOME | End: 2023-12-14
Payer: COMMERCIAL

## 2023-12-14 ENCOUNTER — APPOINTMENT (OUTPATIENT)
Dept: LAB | Facility: HOSPITAL | Age: 83
End: 2023-12-14
Payer: COMMERCIAL

## 2023-12-14 VITALS
HEART RATE: 88 BPM | OXYGEN SATURATION: 96 % | TEMPERATURE: 96 F | DIASTOLIC BLOOD PRESSURE: 72 MMHG | RESPIRATION RATE: 18 BRPM | SYSTOLIC BLOOD PRESSURE: 118 MMHG

## 2023-12-14 DIAGNOSIS — R06.09 DYSPNEA ON EXERTION: ICD-10-CM

## 2023-12-14 DIAGNOSIS — R42 DIZZINESS: ICD-10-CM

## 2023-12-14 DIAGNOSIS — I48.0 PAROXYSMAL ATRIAL FIBRILLATION (HCC): ICD-10-CM

## 2023-12-14 DIAGNOSIS — R00.2 PALPITATIONS: ICD-10-CM

## 2023-12-14 DIAGNOSIS — U09.9 POST-COVID-19 CONDITION: Primary | ICD-10-CM

## 2023-12-14 DIAGNOSIS — R53.1 WEAKNESS: ICD-10-CM

## 2023-12-14 DIAGNOSIS — M46.1 SACROILIITIS, NOT ELSEWHERE CLASSIFIED (HCC): ICD-10-CM

## 2023-12-14 DIAGNOSIS — I10 BENIGN HYPERTENSION: ICD-10-CM

## 2023-12-14 DIAGNOSIS — E87.1 HYPONATREMIA: ICD-10-CM

## 2023-12-14 DIAGNOSIS — U09.9 POST-COVID-19 CONDITION: ICD-10-CM

## 2023-12-14 LAB
ANION GAP SERPL CALCULATED.3IONS-SCNC: 7 MMOL/L
BUN SERPL-MCNC: 30 MG/DL (ref 5–25)
CALCIUM SERPL-MCNC: 10.7 MG/DL (ref 8.4–10.2)
CHLORIDE SERPL-SCNC: 98 MMOL/L (ref 96–108)
CO2 SERPL-SCNC: 28 MMOL/L (ref 21–32)
CREAT SERPL-MCNC: 1.21 MG/DL (ref 0.6–1.3)
GFR SERPL CREATININE-BSD FRML MDRD: 41 ML/MIN/1.73SQ M
GLUCOSE SERPL-MCNC: 104 MG/DL (ref 65–140)
POTASSIUM SERPL-SCNC: 4.6 MMOL/L (ref 3.5–5.3)
SODIUM SERPL-SCNC: 133 MMOL/L (ref 135–147)

## 2023-12-14 PROCEDURE — 36415 COLL VENOUS BLD VENIPUNCTURE: CPT

## 2023-12-14 PROCEDURE — G1004 CDSM NDSC: HCPCS

## 2023-12-14 PROCEDURE — 80048 BASIC METABOLIC PNL TOTAL CA: CPT

## 2023-12-14 PROCEDURE — 99214 OFFICE O/P EST MOD 30 MIN: CPT | Performed by: NURSE PRACTITIONER

## 2023-12-14 PROCEDURE — 71275 CT ANGIOGRAPHY CHEST: CPT

## 2023-12-14 RX ORDER — PREDNISONE 10 MG/1
TABLET ORAL
Qty: 20 TABLET | Refills: 0 | Status: SHIPPED | OUTPATIENT
Start: 2023-12-14 | End: 2023-12-24

## 2023-12-14 RX ADMIN — IOHEXOL 85 ML: 350 INJECTION, SOLUTION INTRAVENOUS at 14:33

## 2023-12-14 NOTE — PROGRESS NOTES
COVID-19 Outpatient Progress Note    Assessment/Plan:    Problem List Items Addressed This Visit          Cardiovascular and Mediastinum    Benign hypertension     Stable with current regimen         Paroxysmal atrial fibrillation ([address])     Managed by cardiologist            Musculoskeletal and Integument    Sacroiliitis, not elsewhere classified ([address])     Other Visit Diagnoses       Post-COVID-19 condition    -  Primary    Relevant Orders    CTA chest pe study    Weakness        Relevant Orders    CTA chest pe study    Basic metabolic panel    Dizziness        Relevant Orders    CTA chest pe study    Basic metabolic panel    Dyspnea on exertion        Relevant Orders    CTA chest pe study    Palpitations        Relevant Orders    CTA chest pe study    Basic metabolic panel           Disposition:     I have spent a total time of 20 minutes on the day of the encounter for this patient including risks and benefits of treatment options, instructions for management, patient and family education and importance of treatment compliance. Patient sent for STAT CT chest to r/o PE and if comes back negative then will start on oral prednisone. Supportive care discussed and advised. Advised to RTO for any worsening and no improvement. Follow up for no improvement and worsening of conditions. Patient advised and educated when to see immediate medical care. Encounter provider: GUS Bond     Provider located at: [address] [place] [address]  [address] [facility] [id number]     Recent Visits  No visits were found meeting these conditions. Showing recent visits within past 7 days and meeting all other requirements  Today's Visits  Date Type Provider Dept   12/14/23 Office Visit Hu Wilkinson, [address] [place] [address] today's visits and meeting all other requirements  Future Appointments  No visits were found meeting these conditions.   Showing future appointments within next 150 days and meeting all other requirements     Subjective:   Cintia Baez is a 80 y.o. female who is concerned about COVID-19. Patient's symptoms include shortness of breath. Patient denies fever, chills, fatigue, malaise, congestion, rhinorrhea, sore throat, anosmia, loss of taste, cough, chest tightness, abdominal pain, nausea, vomiting, diarrhea, myalgias and headaches. - Date of symptom onset: 12/1/2023      COVID-19 vaccination status: Fully vaccinated (primary series)    Still having dizziness and stated that first week was in bed every day and was feeling weak and stated that still feeling very weak and dizzy and having sob with exertion and denies fever, chills and chest pain. Stated that also having feeling of palpitations and not able to do her daily chores. Here today with daughter  Scheduled to follow up with cardiologist next week    No results found for: "Lucienne Heimlich", "[facility]", "Donavon Maldonado", "CORONAVIRUSR", "[facility]", "[facility]"    Review of Systems   Constitutional:  Negative for chills, fatigue and fever. HENT:  Negative for congestion, rhinorrhea and sore throat. Respiratory:  Positive for shortness of breath. Negative for cough and chest tightness. Gastrointestinal:  Negative for abdominal pain, diarrhea, nausea and vomiting. Musculoskeletal:  Negative for myalgias. Neurological:  Positive for dizziness and weakness. Negative for headaches.      Current Outpatient Medications on File Prior to Visit   Medication Sig    atorvastatin (LIPITOR) 40 mg tablet TAKE 1 TABLET BY MOUTH  DAILY    b complex vitamins tablet Take 1 tablet by mouth daily    benzonatate (TESSALON) 200 MG capsule Take 1 capsule (200 mg total) by mouth 3 (three) times a day as needed for cough    Cholecalciferol (VITAMIN D-3) 1000 units CAPS Take by mouth    flecainide (TAMBOCOR) 50 mg tablet TAKE 1 TABLET BY MOUTH  TWICE DAILY    fluticasone (Flovent Diskus) 50 mcg/actuation diskus inhaler Inhale 1 puff 2 (two) times a day Rinse mouth after use. Use for two weeks, then stop    irbesartan (AVAPRO) 300 mg tablet TAKE 1 TABLET BY MOUTH  DAILY    metoprolol succinate (TOPROL-XL) 25 mg 24 hr tablet TAKE 1 TABLET BY MOUTH  DAILY    Multiple Vitamins-Minerals (CENTRUM ADULTS PO) Take by mouth    triamterene-hydrochlorothiazide (MAXZIDE-25) 37.5-25 mg per tablet TAKE ONE-HALF TABLET BY  MOUTH DAILY    Xarelto 20 MG tablet TAKE 1 TABLET BY MOUTH  DAILY WITH DINNER       Objective:    /72   Pulse 88   Temp (!) 96 °F (35.6 °C)   Resp 18   LMP  (LMP Unknown)   SpO2 96%        Physical Exam  HENT:      Head: Normocephalic. Right Ear: External ear normal.      Left Ear: External ear normal.      Nose: Nose normal.   Eyes:      Conjunctiva/sclera: Conjunctivae normal.   Cardiovascular:      Rate and Rhythm: Normal rate and regular rhythm. Heart sounds: Normal heart sounds. Pulmonary:      Effort: Pulmonary effort is normal.      Breath sounds: Normal breath sounds. Musculoskeletal:      Cervical back: Normal range of motion. Comments: Bilateral no calf tenderness and swelling noted  Came by wheelchair   Skin:     General: Skin is warm and dry. Findings: No rash. Neurological:      Mental Status: She is alert and oriented to person, place, and time. Psychiatric:         Mood and Affect: Mood normal.         Behavior: Behavior normal.         Thought Content:  Thought content normal.         Judgment: Judgment normal.       GUS Cerrato

## 2023-12-15 RX ORDER — ASPIRIN 81 MG/1
TABLET ORAL
COMMUNITY
End: 2023-12-20 | Stop reason: HOSPADM

## 2023-12-15 RX ORDER — FLUTICASONE PROPIONATE AND SALMETEROL 250; 50 UG/1; UG/1
POWDER RESPIRATORY (INHALATION)
COMMUNITY

## 2023-12-15 RX ORDER — CLINDAMYCIN HYDROCHLORIDE 150 MG/1
CAPSULE ORAL
COMMUNITY
End: 2023-12-20 | Stop reason: ALTCHOICE

## 2023-12-20 ENCOUNTER — OFFICE VISIT (OUTPATIENT)
Dept: CARDIOLOGY CLINIC | Facility: CLINIC | Age: 83
End: 2023-12-20
Payer: COMMERCIAL

## 2023-12-20 VITALS
SYSTOLIC BLOOD PRESSURE: 120 MMHG | OXYGEN SATURATION: 95 % | BODY MASS INDEX: 40.57 KG/M2 | HEART RATE: 70 BPM | HEIGHT: 67 IN | DIASTOLIC BLOOD PRESSURE: 76 MMHG

## 2023-12-20 DIAGNOSIS — E78.2 MIXED HYPERLIPIDEMIA: ICD-10-CM

## 2023-12-20 DIAGNOSIS — I10 BENIGN HYPERTENSION: ICD-10-CM

## 2023-12-20 DIAGNOSIS — I48.0 PAROXYSMAL ATRIAL FIBRILLATION (HCC): Primary | ICD-10-CM

## 2023-12-20 PROCEDURE — 99214 OFFICE O/P EST MOD 30 MIN: CPT | Performed by: NURSE PRACTITIONER

## 2023-12-20 PROCEDURE — 93000 ELECTROCARDIOGRAM COMPLETE: CPT | Performed by: NURSE PRACTITIONER

## 2023-12-20 RX ORDER — ATORVASTATIN CALCIUM 20 MG/1
40 TABLET, FILM COATED ORAL DAILY
Qty: 90 TABLET | Refills: 3 | Status: SHIPPED | OUTPATIENT
Start: 2023-12-20

## 2023-12-20 NOTE — PROGRESS NOTES
Progress Note - Cardiology Office  [facility]    Venita Alfredo 83 y.o. female MRN: [id number]  : 1940  Encounter: [id number]      Assessment:     1. Paroxysmal atrial fibrillation (HCC)    2. Benign hypertension    3. Mixed hyperlipidemia        Discussion Summary and Plan:  1.  Paroxysmal atrial fibrillation: Patient maintaining sinus rhythm    -   Discussed with her and daughter rationale for flecainide and Toprol XL and she is on the lowest dose of both these medications    -   Continue flecainide 50 mg twice daily    -   Continue Toprol-XL 25 mg once a day    -   Continue Xarelto 20 mg daily    2.  Hypertension: Blood pressure is currently stable    -   Will discontinue triamterene/hydrochlorothiazide    -   Continue Avapro 300 mg daily    -   I have asked patient to monitor blood pressure at home and call if systolic is greater than 140 to 150 mmHg    3.  Dyslipidemia: 2022 lipid panel: Total cholesterol 180, triglycerides 62, , LDL 58,    -   Patient currently taking Lipitor 40 mg once a day    -   Will decrease it to 20 mg daily and recheck lipid panel in 3 months    4.  Chronic kidney disease stage IIIb: Baseline creatinine appears to be 1.2    -   Will hold her triamterene hydrochlorothiazide at this time and recheck labs in approximately 1 month    -   Continue Avapro    5.  Hyponatremia: Sodium is 133, this is in the setting of COVID-19 viral infection    -   Patient's oral intake is decreased    -   Will hold triamterene/hydrochlorothiazide at this time and recheck labs in approximately 1 month.      Patient / Caretaker was advised and educated to call our office  immediately if  patient has any new symptoms of chest pain/shortness of breath, near-syncope, syncope, light headedness sustained palpitations  or any other cardiovascular symptoms before their scheduled follow-up appointment.  Office number was provided #[phone].    Please call [phone]  if any questions.    Counseling :  A description of the counseling.  Goals and Barriers.  Patient's ability to self care: Yes  Medication side effect reviewed with patient in detail and all their questions answered to their satisfaction.    HPI :     Venita Alfredo is a 83 y.o. year old female who came for a 6-month follow up.  Patient does not offer any cardiac complaints.  But she does note she was recently diagnosed with COVID and it has been about 16 days and she is not just now starting to feel better.  Her main complaints are of lightheadedness and unsteady gait.  She also notes a waxing and waning of her ability to do activities since having COVID.  Both myself and her daughter did reassure her that this is most likely part of the recovery process from her COVID.  She denies any chest pain or shortness of breath.    She recently had lab work done at [facility] (at time of COVID diagnosis).  Her sodium was 133 which is unusual for her, BUN is 30, creatinine was 1.2 which appears to be her baseline and GFR was 41.  She was a little upset because she was told she had stage IIIb kidney disease by the practitioner at [facility] and states she has never been told this before.  I did review her labs with her and it appears she has been at stage IIIb chronic kidney disease since 2020.    Review of Systems   Constitutional:  Positive for fatigue. Negative for activity change.   HENT: Negative.  Negative for congestion, ear pain, sinus pressure and trouble swallowing.    Eyes: Negative.  Negative for photophobia and visual disturbance.   Respiratory: Negative.  Negative for chest tightness and shortness of breath.    Cardiovascular: Negative.  Negative for chest pain, palpitations and leg swelling.   Gastrointestinal: Negative.  Negative for abdominal distention, diarrhea, nausea and vomiting.   Endocrine: Negative.  Negative for polydipsia, polyphagia and polyuria.   Genitourinary: Negative.  Negative for  difficulty urinating.   Musculoskeletal:  Positive for gait problem.        Ambulates with walker   Skin: Negative.    Neurological:  Positive for tremors, weakness and light-headedness. Negative for dizziness.   Hematological: Negative.    Psychiatric/Behavioral: Negative.         Historical Information   Past Medical History:   Diagnosis Date    A-fib (HCC)     Cancer (HCC)     Hyperlipidemia     Hypertension     UTI (urinary tract infection)      Past Surgical History:   Procedure Laterality Date    HYSTERECTOMY      JOINT REPLACEMENT       Social History     Substance and Sexual Activity   Alcohol Use Not Currently    Comment: rarely     Social History     Substance and Sexual Activity   Drug Use No     Social History     Tobacco Use   Smoking Status Never   Smokeless Tobacco Never     Family History:   Family History   Problem Relation Age of Onset    Alzheimer's disease Mother     Lung cancer Father     Multiple sclerosis Daughter        Meds/Allergies     Allergies   Allergen Reactions    Macrodantin [Nitrofurantoin Macrocrystal] Vomiting    Nitrofurantoin     Oxycodone-Acetaminophen Vomiting    Sulfa Antibiotics        Current Outpatient Medications:     atorvastatin (LIPITOR) 40 mg tablet, TAKE 1 TABLET BY MOUTH  DAILY, Disp: 90 tablet, Rfl: 3    b complex vitamins tablet, Take 1 tablet by mouth daily, Disp: , Rfl:     benzonatate (TESSALON) 200 MG capsule, Take 1 capsule (200 mg total) by mouth 3 (three) times a day as needed for cough, Disp: 20 capsule, Rfl: 0    Cholecalciferol (VITAMIN D-3) 1000 units CAPS, Take by mouth, Disp: , Rfl:     clindamycin (CLEOCIN) 150 mg capsule, , Disp: , Rfl:     flecainide (TAMBOCOR) 50 mg tablet, TAKE 1 TABLET BY MOUTH  TWICE DAILY, Disp: 180 tablet, Rfl: 3    fluticasone (Flovent Diskus) 50 mcg/actuation diskus inhaler, Inhale 1 puff 2 (two) times a day Rinse mouth after use. Use for two weeks, then stop, Disp: 60 blister, Rfl: 0    Fluticasone-Salmeterol (Advair)  "250-50 mcg/dose inhaler, , Disp: , Rfl:     irbesartan (AVAPRO) 300 mg tablet, TAKE 1 TABLET BY MOUTH  DAILY, Disp: 90 tablet, Rfl: 3    metoprolol succinate (TOPROL-XL) 25 mg 24 hr tablet, TAKE 1 TABLET BY MOUTH  DAILY, Disp: 90 tablet, Rfl: 3    Multiple Vitamins-Minerals (CENTRUM ADULTS PO), Take by mouth, Disp: , Rfl:     predniSONE 10 mg tablet, 4 tabs x 2 days, 3 tabs x 2 days, 2 tabs x 2 days, 1 tab x 2 days, Disp: 20 tablet, Rfl: 0    triamterene-hydrochlorothiazide (MAXZIDE-25) 37.5-25 mg per tablet, TAKE ONE-HALF TABLET BY  MOUTH DAILY, Disp: 45 tablet, Rfl: 3    Xarelto 20 MG tablet, TAKE 1 TABLET BY MOUTH  DAILY WITH DINNER, Disp: 90 tablet, Rfl: 3    aspirin (ECOTRIN LOW STRENGTH) 81 mg EC tablet, Take 1 tablet every day by oral route., Disp: , Rfl:     Vitals: Blood pressure 120/76, pulse 70, height 5' 7\" (1.702 m), SpO2 95%.    Body mass index is 40.57 kg/m².  Wt Readings from Last 3 Encounters:   06/08/23 117 kg (259 lb)   12/12/22 114 kg (252 lb)   09/01/22 112 kg (248 lb)     Vitals:     BP Readings from Last 3 Encounters:   12/20/23 120/76   12/14/23 118/72   12/04/23 128/80       Physical Exam:  Physical Exam  Vitals and nursing note reviewed.   Constitutional:       General: She is not in acute distress.     Appearance: Normal appearance. She is obese.   HENT:      Right Ear: External ear normal.      Left Ear: External ear normal.      Nose: Nose normal.   Eyes:      General: No scleral icterus.        Right eye: No discharge.         Left eye: No discharge.   Cardiovascular:      Rate and Rhythm: Normal rate and regular rhythm.      Pulses: Normal pulses.      Heart sounds: Normal heart sounds.   Pulmonary:      Effort: Pulmonary effort is normal. No respiratory distress.      Breath sounds: Normal breath sounds.   Abdominal:      General: Bowel sounds are normal. There is no distension.      Palpations: Abdomen is soft.   Musculoskeletal:      Right lower leg: No edema.      Left lower leg: " No edema.   Skin:     General: Skin is warm and dry.      Capillary Refill: Capillary refill takes less than 2 seconds.   Neurological:      General: No focal deficit present.      Mental Status: She is alert and oriented to person, place, and time. Mental status is at baseline.   Psychiatric:         Mood and Affect: Mood normal.           Diagnostic Studies Review Cardio:  EKG:  Sinus rhythm      CTA chest pe study    Result Date: 12/14/2023  Narrative: CTA - CHEST WITH IV CONTRAST - PULMONARY ANGIOGRAM INDICATION:   U09.9: Post covid-19 condition, unspecified R53.1: Weakness R42: Dizziness and giddiness R06.09: Other forms of dyspnea R00.2: Palpitations. Shortness of breath with exertion and dizziness. COMPARISON: None. TECHNIQUE: CTA examination of the chest was performed using angiographic technique according to a protocol specifically tailored to evaluate for pulmonary embolism.  Multiplanar 2D reformatted images were created from the source data. In addition, coronal 3D MIP postprocessing was performed on the acquisition scanner. Radiation dose length product (DLP) for this visit:  703.72 mGy-cm .  This examination, like all CT scans performed in the [facility], was performed utilizing techniques to minimize radiation dose exposure, including the use of iterative  reconstruction and automated exposure control. IV Contrast:  85 mL of iohexol (OMNIPAQUE) FINDINGS: PULMONARY ARTERIAL TREE:  No pulmonary embolus is seen. LUNGS:  Lungs are clear.  There is no tracheal or endobronchial lesion. PLEURA:  Unremarkable. HEART/GREAT VESSELS:  Unremarkable for patient's age. No thoracic aortic aneurysm. MEDIASTINUM AND LEONORA: Large hiatal hernia. No mediastinal or hilar lymphadenopathy. CHEST WALL AND LOWER NECK:   Unremarkable. VISUALIZED STRUCTURES IN THE UPPER ABDOMEN: Cholelithiasis. OSSEOUS STRUCTURES:  Spinal degenerative changes are noted.  No acute fracture or destructive osseous lesion.  "    Impression: No pulmonary embolism or acute intrathoracic process. Large hiatal hernia. Workstation performed: [id number]         Lab Review   Lab Results   Component Value Date    WBC 8.54 12/07/2022    HGB 13.1 12/07/2022    HCT 41.6 12/07/2022    MCV 92 12/07/2022    RDW 13.7 12/07/2022     12/07/2022     BMP:  Lab Results   Component Value Date    SODIUM 133 (L) 12/14/2023    K 4.6 12/14/2023    CL 98 12/14/2023    CO2 28 12/14/2023    BUN 30 (H) 12/14/2023    CREATININE 1.21 12/14/2023    GLUC 104 12/14/2023    GLUF 117 (H) 12/07/2022    CALCIUM 10.7 (H) 12/14/2023    EGFR 41 12/14/2023    MG 2.2 07/17/2016     Troponins:    LFT:  Lab Results   Component Value Date    AST 23 12/07/2022    ALT 29 12/07/2022    ALKPHOS 88 12/07/2022    TP 7.0 12/07/2022    ALB 3.5 12/07/2022      No components found for: \"TSH3\"  Lab Results   Component Value Date    ZXL8RUGGSBIB 2.680 10/12/2020     No results found for: \"HGBA1C\"  Lipid Profile:   Lab Results   Component Value Date    CHOLESTEROL 180 12/07/2022     12/07/2022    LDLCALC 58 12/07/2022    TRIG 62 12/07/2022     Lab Results   Component Value Date    CHOLESTEROL 180 12/07/2022    CHOLESTEROL 184 10/12/2020     Lab Results   Component Value Date    CKTOTAL 69 04/17/2018    TROPONINI <0.02 08/23/2019     Lab Results   Component Value Date    NTBNP 143 04/17/2018      Recent Results (from the past 672 hour(s))   Basic metabolic panel    Collection Time: 12/14/23  1:44 PM   Result Value Ref Range    Sodium 133 (L) 135 - 147 mmol/L    Potassium 4.6 3.5 - 5.3 mmol/L    Chloride 98 96 - 108 mmol/L    CO2 28 21 - 32 mmol/L    ANION GAP 7 mmol/L    BUN 30 (H) 5 - 25 mg/dL    Creatinine 1.21 0.60 - 1.30 mg/dL    Glucose 104 65 - 140 mg/dL    Calcium 10.7 (H) 8.4 - 10.2 mg/dL    eGFR 41 ml/min/1.73sq taiwo WEBER  Cardiology        \"This note was completed in part utilizing [email or web address] fluency direct voice recognition software.   Grammatical " "errors, random word insertion, spelling mistakes, and incomplete sentences may be an occasional consequence of the system secondary to software limitations, ambient noise and hardware issues.    Please read the chart carefully and recognize, using context, where substitutions have occurred.  If you have any questions or concerns about the context, text or information contained within the body of this dictation, please contact myself, the provider, for further clarification.\"  "

## 2024-03-26 DIAGNOSIS — I48.0 PAROXYSMAL ATRIAL FIBRILLATION (HCC): ICD-10-CM

## 2024-03-26 RX ORDER — METOPROLOL SUCCINATE 25 MG/1
25 TABLET, EXTENDED RELEASE ORAL DAILY
Qty: 90 TABLET | Refills: 0 | Status: SHIPPED | OUTPATIENT
Start: 2024-03-26

## 2024-03-29 ENCOUNTER — TELEPHONE (OUTPATIENT)
Dept: CARDIOLOGY CLINIC | Facility: CLINIC | Age: 84
End: 2024-03-29

## 2024-03-29 DIAGNOSIS — I48.0 PAROXYSMAL ATRIAL FIBRILLATION (HCC): Primary | ICD-10-CM

## 2024-03-29 DIAGNOSIS — R60.0 BILATERAL EDEMA OF LOWER EXTREMITY: ICD-10-CM

## 2024-03-29 RX ORDER — TRIAMTERENE AND HYDROCHLOROTHIAZIDE 37.5; 25 MG/1; MG/1
1 TABLET ORAL DAILY
Qty: 90 TABLET | Refills: 0 | Status: SHIPPED | OUTPATIENT
Start: 2024-03-29

## 2024-03-29 NOTE — TELEPHONE ENCOUNTER
Patient called to report that she is in [place], and since she's down there in the hot and humidity she had to increase her diuretic to a whole pill again.   She is now running low on her meds because she has increased the dose.   Would you be willing to order it at the increased dose?  She is working on getting blood work done.

## 2024-04-05 ENCOUNTER — TELEPHONE (OUTPATIENT)
Dept: CARDIOLOGY CLINIC | Facility: CLINIC | Age: 84
End: 2024-04-05

## 2024-04-05 NOTE — TELEPHONE ENCOUNTER
Patient called today is in [place] will be back April 25. She has swelling in her feet and ankles did not see anyone. Said she puts her feet up and stays inside.please advise what patient should do.

## 2024-04-05 NOTE — TELEPHONE ENCOUNTER
Pt states she had been taking 1/2 pill everyday. Last week she started taking a full pill every day. She stated that she does put her feet up during the day as needed.

## 2024-05-09 DIAGNOSIS — E78.2 MIXED HYPERLIPIDEMIA: ICD-10-CM

## 2024-05-09 RX ORDER — ATORVASTATIN CALCIUM 20 MG/1
40 TABLET, FILM COATED ORAL DAILY
Qty: 180 TABLET | Refills: 0 | Status: SHIPPED | OUTPATIENT
Start: 2024-05-09

## 2024-05-13 ENCOUNTER — OFFICE VISIT (OUTPATIENT)
Dept: FAMILY MEDICINE CLINIC | Facility: CLINIC | Age: 84
End: 2024-05-13
Payer: COMMERCIAL

## 2024-05-13 VITALS
DIASTOLIC BLOOD PRESSURE: 70 MMHG | HEIGHT: 67 IN | SYSTOLIC BLOOD PRESSURE: 118 MMHG | BODY MASS INDEX: 40.57 KG/M2 | TEMPERATURE: 97 F | RESPIRATION RATE: 18 BRPM | HEART RATE: 72 BPM

## 2024-05-13 DIAGNOSIS — N18.30 STAGE 3 CHRONIC KIDNEY DISEASE, UNSPECIFIED WHETHER STAGE 3A OR 3B CKD (HCC): ICD-10-CM

## 2024-05-13 DIAGNOSIS — J20.9 ACUTE BRONCHITIS, UNSPECIFIED ORGANISM: Primary | ICD-10-CM

## 2024-05-13 DIAGNOSIS — D32.9 MENINGIOMA (HCC): ICD-10-CM

## 2024-05-13 DIAGNOSIS — I48.0 PAROXYSMAL ATRIAL FIBRILLATION (HCC): ICD-10-CM

## 2024-05-13 DIAGNOSIS — E66.01 CLASS 3 OBESITY (HCC): ICD-10-CM

## 2024-05-13 DIAGNOSIS — M46.1 SACROILIITIS, NOT ELSEWHERE CLASSIFIED (HCC): ICD-10-CM

## 2024-05-13 LAB
SARS-COV-2 AG UPPER RESP QL IA: NEGATIVE
SL AMB POCT RAPID FLU A: NORMAL
SL AMB POCT RAPID FLU B: NORMAL
VALID CONTROL: NORMAL

## 2024-05-13 PROCEDURE — 87804 INFLUENZA ASSAY W/OPTIC: CPT | Performed by: INTERNAL MEDICINE

## 2024-05-13 PROCEDURE — 99213 OFFICE O/P EST LOW 20 MIN: CPT | Performed by: INTERNAL MEDICINE

## 2024-05-13 PROCEDURE — 87811 SARS-COV-2 COVID19 W/OPTIC: CPT | Performed by: INTERNAL MEDICINE

## 2024-05-13 PROCEDURE — G2211 COMPLEX E/M VISIT ADD ON: HCPCS | Performed by: INTERNAL MEDICINE

## 2024-05-13 RX ORDER — AMOXICILLIN 875 MG/1
875 TABLET, COATED ORAL 2 TIMES DAILY
Qty: 20 TABLET | Refills: 0 | Status: SHIPPED | OUTPATIENT
Start: 2024-05-13 | End: 2024-05-20

## 2024-05-13 NOTE — PROGRESS NOTES
Name: Venita Alfredo      : 1940      MRN: [id number]  Encounter Provider: Ainsley Enriquez MD  Encounter Date: 2024   Encounter department: [facility]    Assessment & Plan     1. Acute bronchitis, unspecified organism  Comments:  Rx as above, continue supportive care and mucinex DM.  Follow up if not improving.  Orders:  -     amoxicillin (AMOXIL) 875 mg tablet; Take 1 tablet (875 mg total) by mouth 2 (two) times a day for 10 days  -     POCT rapid flu A and B  -     POCT Rapid Covid Ag    2. Sacroiliitis, not elsewhere classified (HCC)    3. Paroxysmal atrial fibrillation ([facility])  Assessment & Plan:  Managed by cardiology.      4. Meningioma ([facility])  Assessment & Plan:  Denies current symptoms.      5. Class 3 obesity ([facility])  Assessment & Plan:  Encouraged weight loss.      6. Stage 3 chronic kidney disease, unspecified whether stage 3a or 3b CKD ([facility])  Assessment & Plan:  Lab Results   Component Value Date    EGFR 41 2023    EGFR 40 2022    EGFR 41 10/12/2020    CREATININE 1.21 2023    CREATININE 1.25 2022    CREATININE 1.25 10/12/2020   Stable and will continue to monitor.             Subjective      Started suddenly 3 days ago with acute fatigue, nausea,  decreased appetite.  No vomitting or diarrhea.  Yesterday she started wheezing, it was loud.  Had a dry cough initially, now with thick yellow sputum.  Feels very weak.  Taking robitussin and tylenol without relief.        Review of Systems   Constitutional:  Positive for fatigue and fever.   HENT:  Positive for congestion, rhinorrhea and sore throat.    Respiratory:  Positive for cough and wheezing. Negative for shortness of breath.    Cardiovascular:  Negative for chest pain and leg swelling.       Current Outpatient Medications on File Prior to Visit   Medication Sig   • atorvastatin (LIPITOR) 20 mg tablet TAKE 2 TABLETS BY MOUTH DAILY (Patient taking differently: Take 20 mg by mouth daily)   • b complex  "vitamins tablet Take 1 tablet by mouth daily   • Cholecalciferol (VITAMIN D-3) 1000 units CAPS Take by mouth   • flecainide (TAMBOCOR) 50 mg tablet TAKE 1 TABLET BY MOUTH  TWICE DAILY   • irbesartan (AVAPRO) 300 mg tablet TAKE 1 TABLET BY MOUTH  DAILY   • metoprolol succinate (TOPROL-XL) 25 mg 24 hr tablet TAKE 1 TABLET BY MOUTH ONCE  DAILY   • Multiple Vitamins-Minerals (CENTRUM ADULTS PO) Take by mouth   • triamterene-hydrochlorothiazide (MAXZIDE-25) 37.5-25 mg per tablet Take 1 tablet by mouth daily   • Xarelto 20 MG tablet TAKE 1 TABLET BY MOUTH  DAILY WITH DINNER   • [DISCONTINUED] benzonatate (TESSALON) 200 MG capsule Take 1 capsule (200 mg total) by mouth 3 (three) times a day as needed for cough (Patient not taking: Reported on 5/13/2024)   • [DISCONTINUED] fluticasone (Flovent Diskus) 50 mcg/actuation diskus inhaler Inhale 1 puff 2 (two) times a day Rinse mouth after use. Use for two weeks, then stop (Patient not taking: Reported on 5/13/2024)   • [DISCONTINUED] Fluticasone-Salmeterol (Advair) 250-50 mcg/dose inhaler  (Patient not taking: Reported on 5/13/2024)       Objective     /70   Pulse 72   Temp (!) 97 °F (36.1 °C)   Resp 18   Ht 5' 7\" (1.702 m)   LMP  (LMP Unknown)   BMI 40.57 kg/m²     Physical Exam  HENT:      Right Ear: Tympanic membrane is retracted.      Left Ear: Tympanic membrane is retracted.      Nose: Mucosal edema and rhinorrhea present.   Cardiovascular:      Rate and Rhythm: Normal rate and regular rhythm.      Heart sounds: Normal heart sounds.   Pulmonary:      Effort: Pulmonary effort is normal.      Breath sounds: Normal breath sounds. No wheezing or rales.   Musculoskeletal:      Cervical back: Neck supple.   Lymphadenopathy:      Cervical: No cervical adenopathy.       Ainsley Enriquez MD    "

## 2024-05-13 NOTE — ASSESSMENT & PLAN NOTE
Lab Results   Component Value Date    EGFR 41 12/14/2023    EGFR 40 12/07/2022    EGFR 41 10/12/2020    CREATININE 1.21 12/14/2023    CREATININE 1.25 12/07/2022    CREATININE 1.25 10/12/2020   Stable and will continue to monitor.

## 2024-05-15 ENCOUNTER — TELEPHONE (OUTPATIENT)
Age: 84
End: 2024-05-15

## 2024-05-15 NOTE — TELEPHONE ENCOUNTER
[facility] called to inquire about the status of plan of care forms that was faxed on 5/5 and 5/13. She said she will fax over again today. She is requesting to be completed and signed by provider, and faxed back at a timely basis.    Please advise    Thank you

## 2024-05-20 ENCOUNTER — APPOINTMENT (OUTPATIENT)
Dept: RADIOLOGY | Facility: CLINIC | Age: 84
End: 2024-05-20
Payer: COMMERCIAL

## 2024-05-20 ENCOUNTER — OFFICE VISIT (OUTPATIENT)
Dept: FAMILY MEDICINE CLINIC | Facility: CLINIC | Age: 84
End: 2024-05-20
Payer: COMMERCIAL

## 2024-05-20 VITALS
DIASTOLIC BLOOD PRESSURE: 80 MMHG | TEMPERATURE: 98.7 F | RESPIRATION RATE: 18 BRPM | SYSTOLIC BLOOD PRESSURE: 120 MMHG | HEART RATE: 85 BPM

## 2024-05-20 DIAGNOSIS — J18.9 PNEUMONIA OF BOTH LOWER LOBES DUE TO INFECTIOUS ORGANISM: ICD-10-CM

## 2024-05-20 DIAGNOSIS — J18.9 PNEUMONIA OF BOTH LOWER LOBES DUE TO INFECTIOUS ORGANISM: Primary | ICD-10-CM

## 2024-05-20 DIAGNOSIS — I48.0 PAROXYSMAL ATRIAL FIBRILLATION (HCC): ICD-10-CM

## 2024-05-20 PROCEDURE — 71046 X-RAY EXAM CHEST 2 VIEWS: CPT

## 2024-05-20 PROCEDURE — G2211 COMPLEX E/M VISIT ADD ON: HCPCS | Performed by: INTERNAL MEDICINE

## 2024-05-20 PROCEDURE — 99213 OFFICE O/P EST LOW 20 MIN: CPT | Performed by: INTERNAL MEDICINE

## 2024-05-20 RX ORDER — BENZONATATE 200 MG/1
200 CAPSULE ORAL 3 TIMES DAILY PRN
Qty: 20 CAPSULE | Refills: 0 | Status: SHIPPED | OUTPATIENT
Start: 2024-05-20

## 2024-05-20 RX ORDER — AMOXICILLIN AND CLAVULANATE POTASSIUM 875; 125 MG/1; MG/1
1 TABLET, FILM COATED ORAL EVERY 12 HOURS SCHEDULED
Qty: 20 TABLET | Refills: 0 | Status: SHIPPED | OUTPATIENT
Start: 2024-05-20 | End: 2024-05-30

## 2024-05-20 NOTE — PROGRESS NOTES
Ambulatory Visit  Name: Venita Alfredo      : 1940      MRN: [id number]  Encounter Provider: Ainsley Enriquez MD  Encounter Date: 2024   Encounter department: Merged with [facility]    Assessment & Plan   1. Pneumonia of both lower lobes due to infectious organism  -     amoxicillin-clavulanate (AUGMENTIN) 875-125 mg per tablet; Take 1 tablet by mouth every 12 (twelve) hours for 10 days  -     XR chest pa & lateral; Future; Expected date: 2024  -     benzonatate (TESSALON) 200 MG capsule; Take 1 capsule (200 mg total) by mouth 3 (three) times a day as needed for cough  Clinical pneumonia due to exam, but afebrile, drinking well, oxygenating well.  Seems to be improving.  Will change antibiotics.  Check xray.  Continue hydration, follow up if not improving.        History of Present Illness     Daughter is here with her today. Both she and her  are sick. She continues to cough and has a wheeze at night.  Cough is no longer yellow, but is clear.  There are no fevers.  She does have a wheeze but no dyspnea. Is trying to keep up with fluids but feels weak.  Daughter is concerned she has been laying in bed and not getting up and around. Taking the amoxicillin but is not sure it is helping her.  She has been doing PT for dizziness but is going to cancel this week.      Review of Systems   Constitutional:  Positive for fatigue. Negative for fever.   HENT:  Positive for congestion. Negative for rhinorrhea and sore throat.    Respiratory:  Positive for cough and wheezing. Negative for shortness of breath.      Past Medical History:   Diagnosis Date   • A-fib (HCC)    • Cancer (HCC)    • Hyperlipidemia    • Hypertension    • UTI (urinary tract infection)      Past Surgical History:   Procedure Laterality Date   • HYSTERECTOMY     • JOINT REPLACEMENT       Family History   Problem Relation Age of Onset   • Alzheimer's disease Mother    • Lung cancer Father    • Multiple sclerosis Daughter       Social History     Tobacco Use   • Smoking status: Never     Passive exposure: Past   • Smokeless tobacco: Never   Vaping Use   • Vaping status: Never Used   Substance and Sexual Activity   • Alcohol use: Not Currently     Comment: rarely   • Drug use: No   • Sexual activity: Not on file     Current Outpatient Medications on File Prior to Visit   Medication Sig   • atorvastatin (LIPITOR) 20 mg tablet TAKE 2 TABLETS BY MOUTH DAILY (Patient taking differently: Take 20 mg by mouth daily)   • b complex vitamins tablet Take 1 tablet by mouth daily   • Cholecalciferol (VITAMIN D-3) 1000 units CAPS Take by mouth   • flecainide (TAMBOCOR) 50 mg tablet TAKE 1 TABLET BY MOUTH  TWICE DAILY   • irbesartan (AVAPRO) 300 mg tablet TAKE 1 TABLET BY MOUTH  DAILY   • metoprolol succinate (TOPROL-XL) 25 mg 24 hr tablet TAKE 1 TABLET BY MOUTH ONCE  DAILY   • Multiple Vitamins-Minerals (CENTRUM ADULTS PO) Take by mouth   • triamterene-hydrochlorothiazide (MAXZIDE-25) 37.5-25 mg per tablet Take 1 tablet by mouth daily   • Xarelto 20 MG tablet TAKE 1 TABLET BY MOUTH  DAILY WITH DINNER   • [DISCONTINUED] amoxicillin (AMOXIL) 875 mg tablet Take 1 tablet (875 mg total) by mouth 2 (two) times a day for 10 days     Allergies   Allergen Reactions   • Macrodantin [Nitrofurantoin Macrocrystal] Vomiting   • Nitrofurantoin    • Oxycodone-Acetaminophen Vomiting   • Sulfa Antibiotics      Immunization History   Administered Date(s) Administered   • COVID-19 MODERNA VACC 0.5 ML IM 03/02/2021, 03/30/2021   • INFLUENZA 11/01/2018   • Influenza Split High Dose Preservative Free IM 11/28/2016   • Influenza, high dose seasonal 0.7 mL 09/21/2020, 10/04/2021   • Pneumococcal Conjugate 13-Valent 01/01/2016   • Pneumococcal Polysaccharide PPV23 01/01/2010   • Tdap 05/26/2016   • Zoster 01/01/2012     Objective     /80   Pulse 85   Temp 98.7 °F (37.1 °C)   Resp 18   LMP  (LMP Unknown)     Physical Exam  HENT:      Head: Normocephalic and  atraumatic.      Right Ear: Tympanic membrane is retracted.      Left Ear: Tympanic membrane is retracted.      Nose: Congestion and rhinorrhea present.      Mouth/Throat:      Pharynx: Oropharynx is clear.   Cardiovascular:      Rate and Rhythm: Normal rate and regular rhythm.      Heart sounds: No murmur heard.     No friction rub. No gallop.   Pulmonary:      Effort: Pulmonary effort is normal.      Breath sounds: Examination of the right-lower field reveals rales. Examination of the left-lower field reveals rales. Rales present. No wheezing or rhonchi.   Musculoskeletal:      Cervical back: Neck supple.   Lymphadenopathy:      Cervical: No cervical adenopathy.       Administrative Statements

## 2024-05-21 DIAGNOSIS — R60.0 BILATERAL EDEMA OF LOWER EXTREMITY: ICD-10-CM

## 2024-05-21 DIAGNOSIS — I48.0 PAROXYSMAL ATRIAL FIBRILLATION (HCC): ICD-10-CM

## 2024-05-21 RX ORDER — METOPROLOL SUCCINATE 25 MG/1
25 TABLET, EXTENDED RELEASE ORAL DAILY
Qty: 90 TABLET | Refills: 1 | Status: SHIPPED | OUTPATIENT
Start: 2024-05-21

## 2024-05-22 RX ORDER — TRIAMTERENE AND HYDROCHLOROTHIAZIDE 37.5; 25 MG/1; MG/1
1 TABLET ORAL DAILY
Qty: 90 TABLET | Refills: 1 | Status: SHIPPED | OUTPATIENT
Start: 2024-05-22

## 2024-06-08 ENCOUNTER — APPOINTMENT (OUTPATIENT)
Dept: LAB | Facility: HOSPITAL | Age: 84
End: 2024-06-08
Payer: COMMERCIAL

## 2024-06-08 DIAGNOSIS — E87.1 HYPONATREMIA: ICD-10-CM

## 2024-06-08 LAB
ALBUMIN SERPL BCP-MCNC: 3.9 G/DL (ref 3.5–5)
ALP SERPL-CCNC: 69 U/L (ref 34–104)
ALT SERPL W P-5'-P-CCNC: 19 U/L (ref 7–52)
ANION GAP SERPL CALCULATED.3IONS-SCNC: 6 MMOL/L (ref 4–13)
AST SERPL W P-5'-P-CCNC: 21 U/L (ref 13–39)
BILIRUB SERPL-MCNC: 0.76 MG/DL (ref 0.2–1)
BUN SERPL-MCNC: 28 MG/DL (ref 5–25)
CALCIUM SERPL-MCNC: 9.6 MG/DL (ref 8.4–10.2)
CHLORIDE SERPL-SCNC: 103 MMOL/L (ref 96–108)
CHOLEST SERPL-MCNC: 180 MG/DL
CO2 SERPL-SCNC: 29 MMOL/L (ref 21–32)
CREAT SERPL-MCNC: 1.24 MG/DL (ref 0.6–1.3)
GFR SERPL CREATININE-BSD FRML MDRD: 40 ML/MIN/1.73SQ M
GLUCOSE P FAST SERPL-MCNC: 106 MG/DL (ref 65–99)
HDLC SERPL-MCNC: 86 MG/DL
LDLC SERPL CALC-MCNC: 71 MG/DL (ref 0–100)
NONHDLC SERPL-MCNC: 94 MG/DL
POTASSIUM SERPL-SCNC: 4 MMOL/L (ref 3.5–5.3)
PROT SERPL-MCNC: 7.1 G/DL (ref 6.4–8.4)
SODIUM SERPL-SCNC: 138 MMOL/L (ref 135–147)
TRIGL SERPL-MCNC: 113 MG/DL

## 2024-06-11 NOTE — PROGRESS NOTES
Progress Note - Cardiology Office  [facility]    Venita Alfredo 83 y.o. female MRN: [id number]  : 1940  Encounter: [id number]      Assessment:     Paroxysmal atrial fibrillation.  Lightheadedness.   Essential hypertension.  Mixed hyperlipidemia.  CKD stage III.  Obstructive sleep apnea.  Class III obesity.    Discussion Summary and Plan:    Paroxysmal atrial fibrillation.  - Patient is in sinus rhythm during today's office visit.  - 24 EKG: Sinus rhythm with first-degree AV block, 89 bpm.  Nonspecific ST and T wave abnormality.  - Continue Toprol-XL 25 mg daily.  - Continue flecainide 50 mg twice daily.  - [id number] stroke risk score: 4 points, moderate to high risk.  - Continue Xarelto 20 mg daily.    Lightheadedness.   - BP during today's office visit, 98/.   - Patient states that over the past 6 months she has noticed episodes of lightheadedness and dizziness with changing position.    - Orthostatic vital signs negative.   - Will decrease irbesartan 300 mg daily to irbesartan 150 mg due to lightheadedness and soft BP during today's office visit.     Essential hypertension.  - BP during today's office visit, 98/62.   - Currently on Toprol-XL 25 mg daily, irbesartan 300 mg daily, and triamterene-hydrochlorothiazide 37.5-25 mg daily.   - Will decrease irbesartan 300 mg daily to irbesartan 150 mg due to lightheadedness and soft BP during today's office visit.   - 19 TTE: LVEF 55 to 60%.  Left ventricle diastolic function parameters were normal for patient's age. Trace mitral regurgitation. Mild tricuspid regurgitation.  - Discussed with patient recommendations for BP journal.    Mixed hyperlipidemia.  - Currently on Lipitor 20 mg daily.  - 24 lipid panel: Cholesterol 180, triglycerides 113, HDL 86, LDL 71.    CKD stage III.  - Baseline creatinine appears to be around 1.2.  - Care per PCP.    Obstructive sleep apnea.  - Patient reports she is not able to tolerate  CPAP.    Class III obesity.  - BMI 40.41 kg/m2.       Patient / Caretaker was advised and educated to call our office  immediately if  patient has any new symptoms of chest pain/shortness of breath, near-syncope, syncope, light headedness sustained palpitations  or any other cardiovascular symptoms before their scheduled follow-up appointment.  Office number was provided #[phone].  Please call [phone] if any questions.  Counseling :  A description of the counseling.  Goals and Barriers.  Patient's ability to self care: Yes  Medication side effect reviewed with patient in detail and all their questions answered to their satisfaction.    HPI :     Venita Alfredo is a 83 y.o. female with PMHx of paroxysmal atrial fibrillation (on Xarelto), essential hypertension, mixed hyperlipidemia, CKD stage III, RAJWINDER (not on CPAP), who presents for routine outpatient cardiology follow-up.     Patient was last seen in outpatient cardiology office on 12/20/23.  Review of chart indicates patient was treated for possible pneumonia and cough by PCP in May 2024.     Patient states that she is overall doing okay but is still recovering from her May 2024 illness.  Patient states that over the past 6 months she has noticed episodes of lightheadedness and dizziness with changing position.  Orthostatic vital signs obtained during today's office visit negative however BP is on the lower side.  Patient also states that she has intermittent episodes of leg swelling, currently stable.     Patient denies experiencing chest pain, palpitations, shortness of breath at rest or with exertion, orthopnea, weight gain, nausea, vomiting.    Review of Systems   Constitutional:  Negative for activity change, appetite change, chills, diaphoresis, fatigue, fever and unexpected weight change.   Respiratory:  Positive for cough. Negative for chest tightness, shortness of breath, wheezing and stridor.    Cardiovascular:  Positive for leg swelling.  Negative for chest pain and palpitations.   Gastrointestinal:  Negative for abdominal distention, abdominal pain, constipation, diarrhea, nausea and vomiting.   Skin: Negative.    Neurological:  Negative for dizziness, syncope, weakness, light-headedness, numbness and headaches.       Historical Information   Past Medical History:   Diagnosis Date    A-fib (HCC)     Cancer (HCC)     Hyperlipidemia     Hypertension     UTI (urinary tract infection)      Past Surgical History:   Procedure Laterality Date    HYSTERECTOMY      JOINT REPLACEMENT       Social History     Substance and Sexual Activity   Alcohol Use Not Currently    Comment: rarely     Social History     Substance and Sexual Activity   Drug Use No     Social History     Tobacco Use   Smoking Status Never    Passive exposure: Past   Smokeless Tobacco Never     Family History:   Family History   Problem Relation Age of Onset    Alzheimer's disease Mother     Lung cancer Father     Multiple sclerosis Daughter        Meds/Allergies     Allergies   Allergen Reactions    Macrodantin [Nitrofurantoin Macrocrystal] Vomiting    Nitrofurantoin     Oxycodone-Acetaminophen Vomiting    Sulfa Antibiotics        Current Outpatient Medications:     atorvastatin (LIPITOR) 20 mg tablet, TAKE 2 TABLETS BY MOUTH DAILY (Patient taking differently: Take 20 mg by mouth daily), Disp: 180 tablet, Rfl: 0    b complex vitamins tablet, Take 1 tablet by mouth daily, Disp: , Rfl:     Cholecalciferol (VITAMIN D-3) 1000 units CAPS, Take by mouth, Disp: , Rfl:     flecainide (TAMBOCOR) 50 mg tablet, TAKE 1 TABLET BY MOUTH  TWICE DAILY, Disp: 180 tablet, Rfl: 3    irbesartan (AVAPRO) 150 mg tablet, Take 1 tablet (150 mg total) by mouth daily at bedtime, Disp: 30 tablet, Rfl: 1    metoprolol succinate (TOPROL-XL) 25 mg 24 hr tablet, TAKE 1 TABLET BY MOUTH ONCE  DAILY, Disp: 90 tablet, Rfl: 1    Multiple Vitamins-Minerals (CENTRUM ADULTS PO), Take by mouth, Disp: , Rfl:      "triamterene-hydrochlorothiazide (MAXZIDE-25) 37.5-25 mg per tablet, TAKE 1 TABLET BY MOUTH DAILY, Disp: 90 tablet, Rfl: 1    Xarelto 20 MG tablet, TAKE 1 TABLET BY MOUTH  DAILY WITH DINNER, Disp: 90 tablet, Rfl: 3    benzonatate (TESSALON) 200 MG capsule, Take 1 capsule (200 mg total) by mouth 3 (three) times a day as needed for cough (Patient not taking: Reported on 2024), Disp: 20 capsule, Rfl: 0    Vitals: Blood pressure 98/62, pulse 88, height 5' 7\" (1.702 m), weight 117 kg (258 lb), SpO2 95%.    Body mass index is 40.41 kg/m².  Wt Readings from Last 3 Encounters:   24 117 kg (258 lb)   23 117 kg (259 lb)   22 114 kg (252 lb)     Vitals:    24 1407   Weight: 117 kg (258 lb)     BP Readings from Last 3 Encounters:   24 98/62   24 120/80   24 118/70       Physical Exam:  Physical Exam  Vitals reviewed.   Constitutional:       General: She is not in acute distress.     Appearance: She is obese.   Cardiovascular:      Rate and Rhythm: Normal rate and regular rhythm.      Pulses: Normal pulses.      Heart sounds: Murmur heard.   Pulmonary:      Effort: Pulmonary effort is normal. No respiratory distress.      Breath sounds: Normal breath sounds.   Abdominal:      General: Abdomen is flat. There is no distension.      Palpations: Abdomen is soft.      Tenderness: There is no abdominal tenderness.   Musculoskeletal:      Right lower leg: No edema.      Left lower leg: No edema.   Skin:     General: Skin is warm and dry.   Neurological:      Mental Status: She is alert and oriented to person, place, and time.         Diagnostic Studies Review Cardio:      EK/12/24 EKG: Sinus rhythm with first-degree AV block, 89 bpm.  Nonspecific ST and T wave abnormality.    Cardiac testing:   Results for orders placed during the hospital encounter of 19    Echo complete with contrast if indicated    Narrative  [address] " [id number]  [phone]    Transthoracic Echocardiogram  2D, M-mode, Doppler, and Color Doppler    Study date:  23-Aug-2019    Patient: DYLAN FLOOD  MR number: [id number]  Account number: [id number]  : 1940  Age: 79 years  Gender: Female  Status: Outpatient  Location: Bedside  Height: 67 in  Weight: 250.6 lb  BP: 152/ 72 mmHg    Indications: A.Fib.    Diagnoses: I48.0 - Atrial fibrillation    Sonographer:  CHENG Witt  Primary Physician:  Ainsley Enriquez MD  Referring Physician:  Sandhya Mcfarlane MD  Group:  [facility]  Interpreting Physician:  Sandhya Mcfarlane MD    SUMMARY    LEFT VENTRICLE:  Systolic function was normal. Ejection fraction was estimated in the range of 55 % to 60 % to be 60 %.  There were no regional wall motion abnormalities.  Wall thickness was mildly increased.  There was mild concentric hypertrophy.    RIGHT ATRIUM:  The atrium was mildly dilated.    MITRAL VALVE:  There was trace regurgitation.    TRICUSPID VALVE:  There was mild regurgitation.  Estimated peak PA pressure was 30 mmHg.    HISTORY: PRIOR HISTORY: HTN,Hyperlipidemia,A.Fib.,Cancer.    PROCEDURE: The procedure was performed at the bedside. This was a routine study. The transthoracic approach was used. The study included complete 2D imaging, M-mode, complete spectral Doppler, and color Doppler. The heart rate was 69 bpm,  at the start of the study. Images were obtained from the parasternal, apical, subcostal, and suprasternal notch acoustic windows. Image quality was adequate.    LEFT VENTRICLE: Size was normal. Systolic function was normal. Ejection fraction was estimated in the range of 55 % to 60 % to be 60 %. There were no regional wall motion abnormalities. Wall thickness was mildly increased. There was mild  concentric hypertrophy. DOPPLER: Left ventricular diastolic function parameters were normal for the patient's age.    RIGHT VENTRICLE: The size was normal. Systolic  function was normal.    LEFT ATRIUM: Size was normal.    RIGHT ATRIUM: The atrium was mildly dilated.    MITRAL VALVE: There was normal leaflet separation. DOPPLER: The transmitral velocity was within the normal range. There was no evidence for stenosis. There was trace regurgitation.    AORTIC VALVE: The valve was trileaflet. Leaflets exhibited mildly increased thickness and normal cuspal separation. DOPPLER: Transaortic velocity was within the normal range. There was no evidence for stenosis. There was no regurgitation.    TRICUSPID VALVE: The valve structure was normal. There was normal leaflet separation. DOPPLER: The transtricuspid velocity was within the normal range. There was no evidence for stenosis. There was mild regurgitation. Estimated peak PA  pressure was 30 mmHg.    PULMONIC VALVE: DOPPLER: There was no significant regurgitation.    PERICARDIUM: There was no thickening or calcification. There was no pericardial effusion.    AORTA: The root exhibited normal size.    SYSTEMIC VEINS: IVC: The inferior vena cava was normal in size. Respirophasic changes were normal.    SYSTEM MEASUREMENT TABLES    2D mode  AoR Diam 2D: 3.3 cm  LA Diam (2D): 3.9 cm  LA/Ao (2D): 1.18  FS (2D Teich): 28.2 %  IVSd (2D): 1.21 cm  LVDEV: 78.1 cmï¾³  LVESV: 35.3 cmï¾³  LVIDd(2D): 4.19 cm  LVISd (2D): 3.01 cm  LVOT Area 2D: 3.14 cmï¾²  LVPWd (2D): 1.2 cm  SV (Teich): 42.8 cmï¾³    Apical four chamber  LVEF A4C: 57 %    Unspecified Scan Mode  REYNALDO Cont Eq (Peak Chemo): 2.58 cmï¾²  LVOT Diam.: 2 cm  LVOT Vmax: 1290 mm/s  LVOT Vmax; Mean: 1290 mm/s  Peak Grad.; Mean: 7 mm[Hg]  MV Peak A Chemo: 622 mm/s  MV Peak E Chemo. Mean: 795 mm/s  MVA (PHT): 4.4 cmï¾²  PHT: 50 ms  Max P mm[Hg]  V Max: 2530 mm/s  Vmax: 2250 mm/s  RA Area: 19.5 cmï¾²  RA Volume: 59.7 cmï¾³  TAPSE: 2.1 cm    [employer] Accredited Echocardiography Laboratory    Prepared and electronically signed by    Sandhya Mcfarlane MD  Signed 23-Aug-2019  16:20:25      Results for orders placed during the hospital encounter of 10/09/19    NM myocardial perfusion spect (stress and/or rest)    Narrative  [address] [address] [phone]    Exercise    Patient: DYLAN FLOOD  MR number: [id number]  Account number: [id number]  : 1940  Age: 79 years  Gender: Female  Status: Outpatient  Location: Stress lab  Height: 67 in  Weight: 256 lb  BP: 132/ 74 mmHg    Allergies: NITROFURANTOIN MACROCRYSTAL, NITROFURANTOIN, OXYCODONE-ACETAMINOPHEN, SULFA ANTIBIOTICS    Diagnosis: I10. - Essential (primary) hypertension    Primary Physician:  Ainsley Enriquez MD  Technician:  Jeannette Valentine  RN:  GUSTAVO Arita  Referring Physician:  Sandhya Mcfarlane MD  Group:  [facility]  Report Prepared By::  GUSTAVO Arita  Interpreting Physician:  Sandhya Mcfarlane MD    INDICATIONS: Evaluation for coronary artery disease.    HISTORY: The patient is a 79 year old  female. Chest pain status: no chest pain. Coronary artery disease risk factors: dyslipidemia and hypertension. Cardiovascular history: arrhythmia. Co-morbidity: obesity. Medications: an  anticoagulant, a beta blocker, a calcium channel blocker, and a lipid lowering agent.    PHYSICAL EXAM: Baseline physical exam screening: normal.    REST ECG: Normal sinus rhythm.    PROCEDURE: The study was performed in the the Stress lab. Treadmill exercise testing was performed, using the Rangel protocol. Systolic blood pressure was 132 mmHg, at the start of the study. Diastolic blood pressure was 74 mmHg, at the  start of the study. The heart rate was 78 bpm, at the start of the study. IV double checked.    RANGEL PROTOCOL:  HR bpm SBP mmHg DBP mmHg Symptoms Rhythm/conduct  Baseline 78 132 74 none NSR  Stage 1 120 148 72 mild fatigue sinus tach  Stage 2 123 -- -- mild dyspnea, severe fatigue --  Immediate 121 166 72 same as above --  Recovery 1 90 146 70  subsiding --  Recovery 2 86 126 72 none --  No medications or fluids given.    STRESS SUMMARY: Duration of exercise was 5 min and 51 sec. The patient exercised to protocol stage 2. Maximal work rate was 5.2 METs. Maximal heart rate during stress was 123 bpm ( 87 % of maximal predicted heart rate). The heart rate  response to stress was normal. There was resting hypertension with an appropriate blood pressure response to stress. The rate-pressure product for the peak heart rate and blood pressure was 27400. There was no chest pain during stress. The  stress test was terminated due to achievement of target heart rate, moderate dyspnea, and severe fatigue. Pre oxygen saturation: 99 %. Peak oxygen saturation: 99 %. The stress ECG was negative for ischemia and normal. There were no stress  arrhythmias or conduction abnormalities.    ISOTOPE ADMINISTRATION:  Resting isotope administration Stress isotope administration  Agent Tetrofosmin Tetrofosmin  Dose 16.05 mCi 49.3 mCi  Date 10/09/2019 10/09/2019    Radiopharmaceutical was administered 3 min, 34 sec into the stress protocol. There was 1 min of exercise after the injection.    MYOCARDIAL PERFUSION IMAGING:  The image quality was fair. Rotating projection images reveal moderate breast attenuation, mild diaphragmatic attenuation, and mild patient motion. Left ventricular size was normal. The TID ratio was .94.    PERFUSION DEFECTS:  -  There was a moderate-sized, mildly severe, fixed myocardial perfusion defect of the basal anterior and anteroseptal wall likely due to attenuation from breast tissue.    GATED SPECT:  The calculated left ventricular ejection fraction was 75 %. Left ventricular ejection fraction was within normal limits by visual estimate. There was no left ventricular regional abnormality.    SUMMARY:  -  Stress results: Duration of exercise was 5 min and 51 sec. Target heart rate was achieved. There was resting hypertension with an appropriate blood  pressure response to stress. There was no chest pain during stress.  -  ECG conclusions: The stress ECG was negative for ischemia and normal.  -  Perfusion imaging: There was a moderate-sized, mildly severe, fixed myocardial perfusion defect of the basal anterior and anteroseptal wall likely due to attenuation from breast tissue.  -  Gated SPECT: The calculated left ventricular ejection fraction was 75 %. Left ventricular ejection fraction was within normal limits by visual estimate. There was no left ventricular regional abnormality.    IMPRESSIONS: Probably Normal study after maximal exercise. Basal mostly fixed defect due to body attenuation artifact. Ef 75%. There was image artifact, without diagnostic evidence for perfusion abnormality. Left ventricular systolic  function was normal.    Prepared and signed by    Sandhya Mcfarlane MD  Signed 10/10/2019 09:58:10       Lab Review   Lab Results   Component Value Date    WBC 8.54 12/07/2022    HGB 13.1 12/07/2022    HCT 41.6 12/07/2022    MCV 92 12/07/2022    RDW 13.7 12/07/2022     12/07/2022     BMP:  Lab Results   Component Value Date    SODIUM 138 06/08/2024    K 4.0 06/08/2024     06/08/2024    CO2 29 06/08/2024    BUN 28 (H) 06/08/2024    CREATININE 1.24 06/08/2024    GLUC 104 12/14/2023    GLUF 106 (H) 06/08/2024    CALCIUM 9.6 06/08/2024    EGFR 40 06/08/2024    MG 2.2 07/17/2016     Troponins:    LFT:  Lab Results   Component Value Date    AST 21 06/08/2024    ALT 19 06/08/2024    ALKPHOS 69 06/08/2024    TP 7.1 06/08/2024    ALB 3.9 06/08/2024     Lipid Profile:   Lab Results   Component Value Date    CHOLESTEROL 180 06/08/2024    HDL 86 06/08/2024    LDLCALC 71 06/08/2024    TRIG 113 06/08/2024     Heide Jorge PA-C

## 2024-06-12 ENCOUNTER — OFFICE VISIT (OUTPATIENT)
Dept: CARDIOLOGY CLINIC | Facility: CLINIC | Age: 84
End: 2024-06-12
Payer: COMMERCIAL

## 2024-06-12 VITALS
WEIGHT: 258 LBS | SYSTOLIC BLOOD PRESSURE: 98 MMHG | BODY MASS INDEX: 40.49 KG/M2 | HEIGHT: 67 IN | HEART RATE: 88 BPM | DIASTOLIC BLOOD PRESSURE: 62 MMHG | OXYGEN SATURATION: 95 %

## 2024-06-12 DIAGNOSIS — I10 BENIGN HYPERTENSION: ICD-10-CM

## 2024-06-12 PROCEDURE — 99214 OFFICE O/P EST MOD 30 MIN: CPT | Performed by: PHYSICIAN ASSISTANT

## 2024-06-12 RX ORDER — IRBESARTAN 150 MG/1
150 TABLET ORAL
Qty: 30 TABLET | Refills: 1 | Status: SHIPPED | OUTPATIENT
Start: 2024-06-12 | End: 2024-08-11

## 2024-06-13 ENCOUNTER — RA CDI HCC (OUTPATIENT)
Dept: OTHER | Facility: HOSPITAL | Age: 84
End: 2024-06-13

## 2024-06-20 ENCOUNTER — OFFICE VISIT (OUTPATIENT)
Dept: FAMILY MEDICINE CLINIC | Facility: CLINIC | Age: 84
End: 2024-06-20
Payer: COMMERCIAL

## 2024-06-20 VITALS
TEMPERATURE: 98.5 F | DIASTOLIC BLOOD PRESSURE: 70 MMHG | RESPIRATION RATE: 16 BRPM | WEIGHT: 256.2 LBS | BODY MASS INDEX: 40.21 KG/M2 | HEART RATE: 86 BPM | HEIGHT: 67 IN | SYSTOLIC BLOOD PRESSURE: 108 MMHG

## 2024-06-20 DIAGNOSIS — Z00.00 MEDICARE ANNUAL WELLNESS VISIT, SUBSEQUENT: Primary | ICD-10-CM

## 2024-06-20 DIAGNOSIS — E78.2 MIXED HYPERLIPIDEMIA: ICD-10-CM

## 2024-06-20 DIAGNOSIS — I10 BENIGN HYPERTENSION: ICD-10-CM

## 2024-06-20 DIAGNOSIS — R63.0 DECREASED APPETITE: ICD-10-CM

## 2024-06-20 PROCEDURE — 99213 OFFICE O/P EST LOW 20 MIN: CPT | Performed by: INTERNAL MEDICINE

## 2024-06-20 PROCEDURE — G0439 PPPS, SUBSEQ VISIT: HCPCS | Performed by: INTERNAL MEDICINE

## 2024-06-20 NOTE — PROGRESS NOTES
Ambulatory Visit  Name: Venita Alfredo      : 1940      MRN: [id number]  Encounter Provider: Ainsley Enriquez MD  Encounter Date: 2024   Encounter department: [facility]    Assessment & Plan   1. Medicare annual wellness visit, subsequent  2. Benign hypertension  Assessment & Plan:  Well controlled and will continue current medications as ordered.   3. Mixed hyperlipidemia  Assessment & Plan:  Reviewed recent labs and well controlled on atorvastatin.  4. Decreased appetite  Comments:  New and likely due to stress over relative's illness.  Labs and exam unremarkable.  If not improving after 1-2 weeks she will return or call.       Preventive health issues were discussed with patient, and age appropriate screening tests were ordered as noted in patient's After Visit Summary. Personalized health advice and appropriate referrals for health education or preventive services given if needed, as noted in patient's After Visit Summary.    History of Present Illness     Here for AWV.  She has not been feeling the best for 4-5 days.  She has had some disturbing news about a family member and now she feels she can't eat.  Nothing appeals to her.  It has only been since she heard this news.  There is no pain, fever, weight loss, change to bowel or bladder.        Patient Care Team:  Ainsley Enriquez MD as PCP - General  MD Ainsley Bailey MD    Review of Systems   Constitutional:  Negative for chills and fever.   HENT:  Negative for ear pain and sore throat.    Eyes:  Negative for pain and visual disturbance.   Respiratory:  Negative for cough and shortness of breath.    Cardiovascular:  Negative for chest pain and palpitations.   Gastrointestinal:  Negative for abdominal pain and vomiting.        Decreased appetite as per HPI.   Genitourinary:  Negative for dysuria and hematuria.   Musculoskeletal:  Negative for arthralgias and back pain.   Skin:  Negative for color change and rash.    Neurological:  Negative for seizures and syncope.   All other systems reviewed and are negative.    Medical History Reviewed by provider this encounter:       Annual Wellness Visit Questionnaire   Venita is here for her Subsequent Wellness visit.     Health Risk Assessment:   Patient rates overall health as good. Patient feels that their physical health rating is slightly worse. Patient is very satisfied with their life. Eyesight was rated as same. Hearing was rated as same. Patient feels that their emotional and mental health rating is same. Patients states they are never, rarely angry. Patient states they are often unusually tired/fatigued. Pain experienced in the last 7 days has been some. Patient's pain rating has been 3/10. Patient states that she has experienced no weight loss or gain in last 6 months.     Depression Screening:   PHQ-2 Score: 0      Fall Risk Screening:   In the past year, patient has experienced: no history of falling in past year      Urinary Incontinence Screening:   Patient has leaked urine accidently in the last six months.     Home Safety:  Patient has trouble with stairs inside or outside of their home. Patient has working smoke alarms and has working carbon monoxide detector. Home safety hazards include: none.     Nutrition:   Current diet is Regular.     Medications:   Patient is currently taking over-the-counter supplements. OTC medications include: see medication list. Patient is able to manage medications.     Activities of Daily Living (ADLs)/Instrumental Activities of Daily Living (IADLs):   Walk and transfer into and out of bed and chair?: Yes  Dress and groom yourself?: Yes    Bathe or shower yourself?: Yes    Feed yourself? Yes  Do your laundry/housekeeping?: Yes  Manage your money, pay your bills and track your expenses?: Yes  Make your own meals?: Yes    Do your own shopping?: Yes    Previous Hospitalizations:   Any hospitalizations or ED visits within the last 12 months?:  No      Advance Care Planning:   Living will: Yes    Advanced directive: Yes    End of Life Decisions reviewed with patient: Yes      Cognitive Screening:   Provider or family/friend/caregiver concerned regarding cognition?: No    PREVENTIVE SCREENINGS      Cardiovascular Screening:    General: Screening Not Indicated and History Lipid Disorder      Diabetes Screening:     General: Screening Current      Colorectal Cancer Screening:     General: Screening Not Indicated      Breast Cancer Screening:     General: Screening Not Indicated      Cervical Cancer Screening:    General: Screening Not Indicated      Osteoporosis Screening:    General: Risks and Benefits Discussed and Patient Declines      Abdominal Aortic Aneurysm (AAA) Screening:        General: Screening Not Indicated      Lung Cancer Screening:     General: Screening Not Indicated      Hepatitis C Screening:    General: Risks and Benefits Discussed    Screening, Brief Intervention, and Referral to Treatment (SBIRT)    Screening      AUDIT-C Screenin) How often did you have a drink containing alcohol in the past year? monthly or less  2) How many drinks did you have on a typical day when you were drinking in the past year? 1 to 2  3) How often did you have 6 or more drinks on one occasion in the past year? never    AUDIT-C Score: 1  Interpretation: Score 0-2 (female): Negative screen for alcohol misuse    Single Item Drug Screening:  How often have you used an illegal drug (including marijuana) or a prescription medication for non-medical reasons in the past year? never    Single Item Drug Screen Score: 0  Interpretation: Negative screen for possible drug use disorder    Social Determinants of Health     Financial Resource Strain: Low Risk  (2022)    Overall Financial Resource Strain (CARDIA)    • Difficulty of Paying Living Expenses: Not hard at all   Food Insecurity: No Food Insecurity (2024)    Hunger Vital Sign    • Worried About Running  "Out of Food in the Last Year: Never true    • Ran Out of Food in the Last Year: Never true   Transportation Needs: No Transportation Needs (6/20/2024)    PRAPARE - Transportation    • Lack of Transportation (Medical): No    • Lack of Transportation (Non-Medical): No   Housing Stability: Low Risk  (6/20/2024)    Housing Stability Vital Sign    • Unable to Pay for Housing in the Last Year: No    • Number of Times Moved in the Last Year: 1    • Homeless in the Last Year: No   Utilities: Not At Risk (6/20/2024)    [facility] Utilities    • Threatened with loss of utilities: No     No results found.    Objective     /70   Pulse 86   Temp 98.5 °F (36.9 °C)   Resp 16   Ht 5' 7\" (1.702 m)   Wt 116 kg (256 lb 3.2 oz)   LMP  (LMP Unknown)   BMI 40.13 kg/m²     Physical Exam  Constitutional:       General: She is not in acute distress.     Appearance: She is well-developed. She is not diaphoretic.   HENT:      Head: Normocephalic and atraumatic.      Right Ear: External ear normal.      Left Ear: External ear normal.      Nose: Nose normal.      Mouth/Throat:      Mouth: Oropharynx is clear and moist.   Eyes:      Extraocular Movements: EOM normal.      Pupils: Pupils are equal, round, and reactive to light.   Neck:      Thyroid: No thyromegaly.      Vascular: No JVD.   Cardiovascular:      Rate and Rhythm: Regular rhythm.      Heart sounds: No murmur heard.     No friction rub. No gallop.   Pulmonary:      Effort: Pulmonary effort is normal.      Breath sounds: Normal breath sounds. No wheezing or rales.   Abdominal:      General: Bowel sounds are normal. There is no distension.      Palpations: Abdomen is soft.      Tenderness: There is no abdominal tenderness.   Musculoskeletal:         General: No edema. Normal range of motion.      Cervical back: Normal range of motion and neck supple.   Skin:     General: Skin is warm and dry.      Findings: No rash.   Neurological:      Mental Status: She is alert and oriented to " person, place, and time.      Cranial Nerves: No cranial nerve deficit.      Sensory: No sensory deficit.      Motor: No abnormal muscle tone.      Coordination: Coordination normal.      Deep Tendon Reflexes: Reflexes normal.   Psychiatric:         Mood and Affect: Mood and affect normal.         Behavior: Behavior normal.         Thought Content: Thought content normal.         Judgment: Judgment normal.       Administrative Statements

## 2024-06-20 NOTE — PATIENT INSTRUCTIONS

## 2024-09-13 ENCOUNTER — OFFICE VISIT (OUTPATIENT)
Dept: CARDIOLOGY CLINIC | Facility: CLINIC | Age: 84
End: 2024-09-13
Payer: COMMERCIAL

## 2024-09-13 VITALS
BODY MASS INDEX: 40.02 KG/M2 | SYSTOLIC BLOOD PRESSURE: 124 MMHG | OXYGEN SATURATION: 98 % | HEART RATE: 75 BPM | DIASTOLIC BLOOD PRESSURE: 80 MMHG | WEIGHT: 255 LBS | HEIGHT: 67 IN

## 2024-09-13 DIAGNOSIS — I48.0 PAROXYSMAL ATRIAL FIBRILLATION (HCC): Primary | ICD-10-CM

## 2024-09-13 DIAGNOSIS — I10 BENIGN HYPERTENSION: ICD-10-CM

## 2024-09-13 PROCEDURE — 93000 ELECTROCARDIOGRAM COMPLETE: CPT | Performed by: PHYSICIAN ASSISTANT

## 2024-09-13 PROCEDURE — 99214 OFFICE O/P EST MOD 30 MIN: CPT | Performed by: PHYSICIAN ASSISTANT

## 2024-09-13 RX ORDER — ATORVASTATIN CALCIUM 40 MG/1
40 TABLET, FILM COATED ORAL DAILY
COMMUNITY

## 2024-09-13 NOTE — PROGRESS NOTES
Progress Note - Cardiology Office  [facility]    Venita Alfredo 84 y.o. female MRN: [id number]  : 1940  Encounter: [id number]      Assessment:     Paroxysmal atrial fibrillation.  Essential hypertension.  Mixed hyperlipidemia.  CKD stage III.  Obstructive sleep apnea.  Class III obesity.    Discussion Summary and Plan:    Paroxysmal atrial fibrillation.  - 24 EKG: Sinus rhythm with first-degree AV block, 75 bpm.  - Continue Toprol-XL 25 mg daily.  - Continue flecainide 50 mg twice daily.  - [id number] stroke risk score: 4 points, moderate to high risk.  - Continue Xarelto 20 mg daily.    Essential hypertension.  - BP during today's office visit, 124/80.   - Currently on Toprol-XL 25 mg daily, irbesartan 150 mg daily, and triamterene-hydrochlorothiazide 37.5-25 mg daily.    - 19 TTE: LVEF 55 to 60%.  Left ventricle diastolic function parameters were normal for patient's age. Trace mitral regurgitation. Mild tricuspid regurgitation.  - Discussed with patient recommendations for BP journal.    Mixed hyperlipidemia.  - Currently on Lipitor 20 mg daily.  - 24 lipid panel: Cholesterol 180, triglycerides 113, HDL 86, LDL 71.    CKD stage III.  - Baseline creatinine appears to be around 1.2.  - Care per PCP.    Obstructive sleep apnea.  - Patient reports she is not able to tolerate CPAP.    Class III obesity.  - BMI 39.94 kg/m2.       Patient / Caretaker was advised and educated to call our office  immediately if  patient has any new symptoms of chest pain/shortness of breath, near-syncope, syncope, light headedness sustained palpitations  or any other cardiovascular symptoms before their scheduled follow-up appointment.  Office number was provided #[phone].  Please call [phone] if any questions.  Counseling :  A description of the counseling.  Goals and Barriers.  Patient's ability to self care: Yes  Medication side effect reviewed with patient in detail and all  their questions answered to their satisfaction.    HPI :     Venita Alfredo is a 84 y.o. female with PMHx of paroxysmal atrial fibrillation (on Xarelto), essential hypertension, mixed hyperlipidemia, CKD stage III, RAJWINDER (not on CPAP), who presents for routine outpatient cardiology follow-up.    9/13/24:        Patient was last seen in outpatient cardiology office on 6/12/24.  Review of last outpatient cardiology note indicates decision patient made to decrease irbesartan 300 mg daily to irbesartan 150 mg due to lightheadedness and soft BP.       Patient reports since last outpatient cardiology office visit she has overall been doing well.  She offers no complaints.  She denies experiencing chest pain, palpitations, shortness of breath at rest or with exertion, lower extremity edema, orthopnea, weight gain, lightheadedness, dizziness, headache, nausea, vomiting.  Patient inquires about weight loss medications such as Ozempic.  Discussed with patient there is no contraindication from a cardiology standpoint for her to pursue further investigation of weight loss medications.      6/12/24:        Patient was last seen in outpatient cardiology office on 12/20/23.  Review of chart indicates patient was treated for possible pneumonia and cough by PCP in May 2024.     Patient states that she is overall doing okay but is still recovering from her May 2024 illness.  Patient states that over the past 6 months she has noticed episodes of lightheadedness and dizziness with changing position.  Orthostatic vital signs obtained during today's office visit negative however BP is on the lower side.  Patient also states that she has intermittent episodes of leg swelling, currently stable.     Patient denies experiencing chest pain, palpitations, shortness of breath at rest or with exertion, orthopnea, weight gain, nausea, vomiting.      Historical Information   Past Medical History:   Diagnosis Date    A-fib (HCC)     Cancer (HCC)      "Hyperlipidemia     Hypertension     UTI (urinary tract infection)      Past Surgical History:   Procedure Laterality Date    HYSTERECTOMY      JOINT REPLACEMENT       Social History     Substance and Sexual Activity   Alcohol Use Not Currently    Comment: rarely     Social History     Substance and Sexual Activity   Drug Use No     Social History     Tobacco Use   Smoking Status Never    Passive exposure: Past   Smokeless Tobacco Never     Family History:   Family History   Problem Relation Age of Onset    Alzheimer's disease Mother     Lung cancer Father     Multiple sclerosis Daughter        Meds/Allergies     Allergies   Allergen Reactions    Macrodantin [Nitrofurantoin Macrocrystal] Vomiting    Nitrofurantoin     Oxycodone-Acetaminophen Vomiting    Sulfa Antibiotics        Current Outpatient Medications:     atorvastatin (LIPITOR) 40 mg tablet, Take 40 mg by mouth daily, Disp: , Rfl:     b complex vitamins tablet, Take 1 tablet by mouth daily, Disp: , Rfl:     Cholecalciferol (VITAMIN D-3) 1000 units CAPS, Take by mouth, Disp: , Rfl:     flecainide (TAMBOCOR) 50 mg tablet, TAKE 1 TABLET BY MOUTH  TWICE DAILY, Disp: 180 tablet, Rfl: 3    metoprolol succinate (TOPROL-XL) 25 mg 24 hr tablet, TAKE 1 TABLET BY MOUTH ONCE  DAILY, Disp: 90 tablet, Rfl: 1    Multiple Vitamins-Minerals (CENTRUM ADULTS PO), Take by mouth, Disp: , Rfl:     triamterene-hydrochlorothiazide (MAXZIDE-25) 37.5-25 mg per tablet, TAKE 1 TABLET BY MOUTH DAILY, Disp: 90 tablet, Rfl: 1    Xarelto 20 MG tablet, TAKE 1 TABLET BY MOUTH  DAILY WITH DINNER, Disp: 90 tablet, Rfl: 3    irbesartan (AVAPRO) 150 mg tablet, Take 1 tablet (150 mg total) by mouth daily at bedtime, Disp: 30 tablet, Rfl: 1    Vitals: Blood pressure 124/80, pulse 75, height 5' 7\" (1.702 m), weight 116 kg (255 lb), SpO2 98%.    Body mass index is 39.94 kg/m².  Wt Readings from Last 3 Encounters:   09/13/24 116 kg (255 lb)   06/20/24 116 kg (256 lb 3.2 oz)   06/12/24 117 kg (258 lb) "     Vitals:    24 1436   Weight: 116 kg (255 lb)       BP Readings from Last 3 Encounters:   24 124/80   24 108/70   24 98/62       Physical Exam:  Physical Exam  Vitals reviewed.   Constitutional:       General: She is not in acute distress.     Appearance: She is obese.   Cardiovascular:      Rate and Rhythm: Normal rate and regular rhythm.      Pulses: Normal pulses.      Heart sounds: Murmur heard.   Pulmonary:      Effort: Pulmonary effort is normal. No respiratory distress.      Breath sounds: Normal breath sounds.   Abdominal:      General: Abdomen is flat. There is no distension.      Palpations: Abdomen is soft.      Tenderness: There is no abdominal tenderness.   Musculoskeletal:      Right lower leg: No edema.      Left lower leg: No edema.   Skin:     General: Skin is warm and dry.   Neurological:      Mental Status: She is alert and oriented to person, place, and time.         Diagnostic Studies Review Cardio:      EK/13/24 EKG: Sinus rhythm with first-degree AV block, 75 bpm.      Cardiac testing:   Results for orders placed during the hospital encounter of 19    Echo complete with contrast if indicated    Narrative  [address] [id number] [phone]    Transthoracic Echocardiogram  2D, M-mode, Doppler, and Color Doppler    Study date:  23-Aug-2019    Patient: DYLAN FLOOD  MR number: [id number]  Account number: [id number]  : 1940  Age: 79 years  Gender: Female  Status: Outpatient  Location: Bedside  Height: 67 in  Weight: 250.6 lb  BP: 152/ 72 mmHg    Indications: A.Fib.    Diagnoses: I48.0 - Atrial fibrillation    Sonographer:  CHENG Witt  Primary Physician:  Ainsley Enriquez MD  Referring Physician:  Sandhya Mcfarlane MD  Group:  [facility]  Interpreting Physician:  Sandhya Mcfarlane MD    SUMMARY    LEFT VENTRICLE:  Systolic function was normal. Ejection  fraction was estimated in the range of 55 % to 60 % to be 60 %.  There were no regional wall motion abnormalities.  Wall thickness was mildly increased.  There was mild concentric hypertrophy.    RIGHT ATRIUM:  The atrium was mildly dilated.    MITRAL VALVE:  There was trace regurgitation.    TRICUSPID VALVE:  There was mild regurgitation.  Estimated peak PA pressure was 30 mmHg.    HISTORY: PRIOR HISTORY: HTN,Hyperlipidemia,A.Fib.,Cancer.    PROCEDURE: The procedure was performed at the bedside. This was a routine study. The transthoracic approach was used. The study included complete 2D imaging, M-mode, complete spectral Doppler, and color Doppler. The heart rate was 69 bpm,  at the start of the study. Images were obtained from the parasternal, apical, subcostal, and suprasternal notch acoustic windows. Image quality was adequate.    LEFT VENTRICLE: Size was normal. Systolic function was normal. Ejection fraction was estimated in the range of 55 % to 60 % to be 60 %. There were no regional wall motion abnormalities. Wall thickness was mildly increased. There was mild  concentric hypertrophy. DOPPLER: Left ventricular diastolic function parameters were normal for the patient's age.    RIGHT VENTRICLE: The size was normal. Systolic function was normal.    LEFT ATRIUM: Size was normal.    RIGHT ATRIUM: The atrium was mildly dilated.    MITRAL VALVE: There was normal leaflet separation. DOPPLER: The transmitral velocity was within the normal range. There was no evidence for stenosis. There was trace regurgitation.    AORTIC VALVE: The valve was trileaflet. Leaflets exhibited mildly increased thickness and normal cuspal separation. DOPPLER: Transaortic velocity was within the normal range. There was no evidence for stenosis. There was no regurgitation.    TRICUSPID VALVE: The valve structure was normal. There was normal leaflet separation. DOPPLER: The transtricuspid velocity was within the normal range. There was no  evidence for stenosis. There was mild regurgitation. Estimated peak PA  pressure was 30 mmHg.    PULMONIC VALVE: DOPPLER: There was no significant regurgitation.    PERICARDIUM: There was no thickening or calcification. There was no pericardial effusion.    AORTA: The root exhibited normal size.    SYSTEMIC VEINS: IVC: The inferior vena cava was normal in size. Respirophasic changes were normal.    SYSTEM MEASUREMENT TABLES    2D mode  AoR Diam 2D: 3.3 cm  LA Diam (2D): 3.9 cm  LA/Ao (2D): 1.18  FS (2D Teich): 28.2 %  IVSd (2D): 1.21 cm  LVDEV: 78.1 cmï¾³  LVESV: 35.3 cmï¾³  LVIDd(2D): 4.19 cm  LVISd (2D): 3.01 cm  LVOT Area 2D: 3.14 cmï¾²  LVPWd (2D): 1.2 cm  SV (Teich): 42.8 cmï¾³    Apical four chamber  LVEF A4C: 57 %    Unspecified Scan Mode  REYNALDO Cont Eq (Peak Chemo): 2.58 cmï¾²  LVOT Diam.: 2 cm  LVOT Vmax: 1290 mm/s  LVOT Vmax; Mean: 1290 mm/s  Peak Grad.; Mean: 7 mm[Hg]  MV Peak A Chemo: 622 mm/s  MV Peak E Chemo. Mean: 795 mm/s  MVA (PHT): 4.4 cmï¾²  PHT: 50 ms  Max P mm[Hg]  V Max: 2530 mm/s  Vmax: 2250 mm/s  RA Area: 19.5 cmï¾²  RA Volume: 59.7 cmï¾³  TAPSE: 2.1 cm    [employer] Accredited Echocardiography Laboratory    Prepared and electronically signed by    Sandhay Mcfarlane MD  Signed 23-Aug-2019 16:20:25      Results for orders placed during the hospital encounter of 10/09/19    NM myocardial perfusion spect (stress and/or rest)    Narrative  [address] [address] [phone]    Exercise    Patient: DYLAN FLOOD  MR number: [id number]  Account number: [id number]  : 1940  Age: 79 years  Gender: Female  Status: Outpatient  Location: Stress lab  Height: 67 in  Weight: 256 lb  BP: 132/ 74 mmHg    Allergies: NITROFURANTOIN MACROCRYSTAL, NITROFURANTOIN, OXYCODONE-ACETAMINOPHEN, SULFA ANTIBIOTICS    Diagnosis: I10. - Essential (primary) hypertension    Primary Physician:  Ainsley Enriquez MD  Technician:  Jeannette Valentine  RN:   GUSTAVO Arita  Referring Physician:  Sandhya Mcfarlane MD  Group:  [facility]  Report Prepared By::  GUSTAVO Arita  Interpreting Physician:  Sandhya Mcfarlane MD    INDICATIONS: Evaluation for coronary artery disease.    HISTORY: The patient is a 79 year old  female. Chest pain status: no chest pain. Coronary artery disease risk factors: dyslipidemia and hypertension. Cardiovascular history: arrhythmia. Co-morbidity: obesity. Medications: an  anticoagulant, a beta blocker, a calcium channel blocker, and a lipid lowering agent.    PHYSICAL EXAM: Baseline physical exam screening: normal.    REST ECG: Normal sinus rhythm.    PROCEDURE: The study was performed in the the Stress lab. Treadmill exercise testing was performed, using the Rangel protocol. Systolic blood pressure was 132 mmHg, at the start of the study. Diastolic blood pressure was 74 mmHg, at the  start of the study. The heart rate was 78 bpm, at the start of the study. IV double checked.    RANGEL PROTOCOL:  HR bpm SBP mmHg DBP mmHg Symptoms Rhythm/conduct  Baseline 78 132 74 none NSR  Stage 1 120 148 72 mild fatigue sinus tach  Stage 2 123 -- -- mild dyspnea, severe fatigue --  Immediate 121 166 72 same as above --  Recovery 1 90 146 70 subsiding --  Recovery 2 86 126 72 none --  No medications or fluids given.    STRESS SUMMARY: Duration of exercise was 5 min and 51 sec. The patient exercised to protocol stage 2. Maximal work rate was 5.2 METs. Maximal heart rate during stress was 123 bpm ( 87 % of maximal predicted heart rate). The heart rate  response to stress was normal. There was resting hypertension with an appropriate blood pressure response to stress. The rate-pressure product for the peak heart rate and blood pressure was 42074. There was no chest pain during stress. The  stress test was terminated due to achievement of target heart rate, moderate dyspnea, and severe fatigue. Pre oxygen saturation: 99 %. Peak oxygen saturation:  99 %. The stress ECG was negative for ischemia and normal. There were no stress  arrhythmias or conduction abnormalities.    ISOTOPE ADMINISTRATION:  Resting isotope administration Stress isotope administration  Agent Tetrofosmin Tetrofosmin  Dose 16.05 mCi 49.3 mCi  Date 10/09/2019 10/09/2019    Radiopharmaceutical was administered 3 min, 34 sec into the stress protocol. There was 1 min of exercise after the injection.    MYOCARDIAL PERFUSION IMAGING:  The image quality was fair. Rotating projection images reveal moderate breast attenuation, mild diaphragmatic attenuation, and mild patient motion. Left ventricular size was normal. The TID ratio was .94.    PERFUSION DEFECTS:  -  There was a moderate-sized, mildly severe, fixed myocardial perfusion defect of the basal anterior and anteroseptal wall likely due to attenuation from breast tissue.    GATED SPECT:  The calculated left ventricular ejection fraction was 75 %. Left ventricular ejection fraction was within normal limits by visual estimate. There was no left ventricular regional abnormality.    SUMMARY:  -  Stress results: Duration of exercise was 5 min and 51 sec. Target heart rate was achieved. There was resting hypertension with an appropriate blood pressure response to stress. There was no chest pain during stress.  -  ECG conclusions: The stress ECG was negative for ischemia and normal.  -  Perfusion imaging: There was a moderate-sized, mildly severe, fixed myocardial perfusion defect of the basal anterior and anteroseptal wall likely due to attenuation from breast tissue.  -  Gated SPECT: The calculated left ventricular ejection fraction was 75 %. Left ventricular ejection fraction was within normal limits by visual estimate. There was no left ventricular regional abnormality.    IMPRESSIONS: Probably Normal study after maximal exercise. Basal mostly fixed defect due to body attenuation artifact. Ef 75%. There was image artifact, without diagnostic  evidence for perfusion abnormality. Left ventricular systolic  function was normal.    Prepared and signed by    Sandhya Mcfarlane MD  Signed 10/10/2019 09:58:10       Lab Review   Lab Results   Component Value Date    WBC 8.54 12/07/2022    HGB 13.1 12/07/2022    HCT 41.6 12/07/2022    MCV 92 12/07/2022    RDW 13.7 12/07/2022     12/07/2022     BMP:  Lab Results   Component Value Date    SODIUM 138 06/08/2024    K 4.0 06/08/2024     06/08/2024    CO2 29 06/08/2024    BUN 28 (H) 06/08/2024    CREATININE 1.24 06/08/2024    GLUC 104 12/14/2023    GLUF 106 (H) 06/08/2024    CALCIUM 9.6 06/08/2024    EGFR 40 06/08/2024    MG 2.2 07/17/2016     LFT:  Lab Results   Component Value Date    AST 21 06/08/2024    ALT 19 06/08/2024    ALKPHOS 69 06/08/2024    TP 7.1 06/08/2024    ALB 3.9 06/08/2024     Lipid Profile:   Lab Results   Component Value Date    CHOLESTEROL 180 06/08/2024    HDL 86 06/08/2024    LDLCALC 71 06/08/2024    TRIG 113 06/08/2024     Heide Jorge PA-C

## 2024-09-17 DIAGNOSIS — I48.0 PAROXYSMAL ATRIAL FIBRILLATION (HCC): ICD-10-CM

## 2024-09-18 RX ORDER — RIVAROXABAN 20 MG/1
TABLET, FILM COATED ORAL
Qty: 100 TABLET | Refills: 0 | Status: SHIPPED | OUTPATIENT
Start: 2024-09-18

## 2024-11-13 DIAGNOSIS — I48.0 PAROXYSMAL ATRIAL FIBRILLATION (HCC): ICD-10-CM

## 2024-11-13 RX ORDER — RIVAROXABAN 20 MG/1
20 TABLET, FILM COATED ORAL
Qty: 90 TABLET | Refills: 3 | Status: SHIPPED | OUTPATIENT
Start: 2024-11-13

## 2024-11-19 DIAGNOSIS — I48.0 PAROXYSMAL ATRIAL FIBRILLATION (HCC): ICD-10-CM

## 2024-11-20 RX ORDER — METOPROLOL SUCCINATE 25 MG/1
25 TABLET, EXTENDED RELEASE ORAL DAILY
Qty: 90 TABLET | Refills: 1 | Status: SHIPPED | OUTPATIENT
Start: 2024-11-20

## 2024-11-26 DIAGNOSIS — I48.0 PAROXYSMAL ATRIAL FIBRILLATION (HCC): ICD-10-CM

## 2024-11-27 RX ORDER — FLECAINIDE ACETATE 50 MG/1
50 TABLET ORAL 2 TIMES DAILY
Qty: 180 TABLET | Refills: 3 | Status: SHIPPED | OUTPATIENT
Start: 2024-11-27

## 2024-11-27 RX ORDER — FLECAINIDE ACETATE 50 MG/1
50 TABLET ORAL 2 TIMES DAILY
Qty: 28 TABLET | Refills: 0 | Status: SHIPPED | OUTPATIENT
Start: 2024-11-27

## 2024-11-27 RX ORDER — FLECAINIDE ACETATE 50 MG/1
50 TABLET ORAL 2 TIMES DAILY
Qty: 180 TABLET | Refills: 3 | Status: SHIPPED | OUTPATIENT
Start: 2024-11-27 | End: 2024-11-27 | Stop reason: SDUPTHER

## 2024-11-27 NOTE — TELEPHONE ENCOUNTER
This is NOT a duplicate. The long term needs to go to the mail order and a short term to the local.     Reason for call:   [x] Refill   [] Prior Auth  [] Other:     Office:   [] PCP/Provider -   [x] Specialty/Provider - Cardio     Medication:   Flecainide 50mg- take 1 tablet by mouth 2 times a day      Pharmacy: [facility] delivery/[facility]    Does the patient have enough for 3 days?   [] Yes   [x] No - Send as HP to POD

## 2024-11-29 DIAGNOSIS — I10 BENIGN HYPERTENSION: ICD-10-CM

## 2024-12-02 RX ORDER — IRBESARTAN 150 MG/1
150 TABLET ORAL
Qty: 90 TABLET | Refills: 1 | Status: SHIPPED | OUTPATIENT
Start: 2024-12-02

## 2024-12-16 ENCOUNTER — RA CDI HCC (OUTPATIENT)
Dept: OTHER | Facility: HOSPITAL | Age: 84
End: 2024-12-16

## 2024-12-19 ENCOUNTER — OFFICE VISIT (OUTPATIENT)
Dept: CARDIOLOGY CLINIC | Facility: CLINIC | Age: 84
End: 2024-12-19
Payer: COMMERCIAL

## 2024-12-19 VITALS
WEIGHT: 259 LBS | OXYGEN SATURATION: 93 % | SYSTOLIC BLOOD PRESSURE: 112 MMHG | HEIGHT: 67 IN | DIASTOLIC BLOOD PRESSURE: 70 MMHG | HEART RATE: 84 BPM | BODY MASS INDEX: 40.65 KG/M2

## 2024-12-19 DIAGNOSIS — I48.0 PAROXYSMAL ATRIAL FIBRILLATION (HCC): ICD-10-CM

## 2024-12-19 DIAGNOSIS — I10 BENIGN HYPERTENSION: ICD-10-CM

## 2024-12-19 DIAGNOSIS — E78.2 MIXED HYPERLIPIDEMIA: ICD-10-CM

## 2024-12-19 DIAGNOSIS — R42 DIZZINESS: ICD-10-CM

## 2024-12-19 DIAGNOSIS — N18.30 STAGE 3 CHRONIC KIDNEY DISEASE, UNSPECIFIED WHETHER STAGE 3A OR 3B CKD (HCC): ICD-10-CM

## 2024-12-19 DIAGNOSIS — E66.813 CLASS 3 OBESITY: ICD-10-CM

## 2024-12-19 DIAGNOSIS — R60.0 BILATERAL EDEMA OF LOWER EXTREMITY: ICD-10-CM

## 2024-12-19 DIAGNOSIS — G47.33 OSA (OBSTRUCTIVE SLEEP APNEA): ICD-10-CM

## 2024-12-19 PROCEDURE — 93000 ELECTROCARDIOGRAM COMPLETE: CPT | Performed by: INTERNAL MEDICINE

## 2024-12-19 PROCEDURE — 99214 OFFICE O/P EST MOD 30 MIN: CPT | Performed by: INTERNAL MEDICINE

## 2024-12-19 RX ORDER — IRBESARTAN 150 MG/1
150 TABLET ORAL
Qty: 90 TABLET | Refills: 1 | Status: SHIPPED | OUTPATIENT
Start: 2024-12-19

## 2024-12-19 RX ORDER — ATORVASTATIN CALCIUM 20 MG/1
20 TABLET, FILM COATED ORAL DAILY
Qty: 90 TABLET | Refills: 1 | Status: SHIPPED | OUTPATIENT
Start: 2024-12-19

## 2024-12-19 NOTE — PROGRESS NOTES
Progress Note - Cardiology Office  [facility]    Venita Alfredo 84 y.o. female MRN: [id number]  : 1940  Encounter: [id number]      Assessment:     1. Paroxysmal atrial fibrillation (HCC)    2. Mixed hyperlipidemia    3. Benign hypertension    4. Dizziness    5. Stage 3 chronic kidney disease, unspecified whether stage 3a or 3b CKD (HCC)    6. RAJWINDER (obstructive sleep apnea)    7. Bilateral edema of lower extremity    8. Class 3 obesity with BMI around 42        Discussion Summary and Plan:  1. Paroxysmal atrial fibrillation.  Patient is staying in sinus rhythm.   She has no reoccurrence.  Continue Xarelto and continue flecainide.  No further episodes of palpitations.  Continue current medications.  Her labs from 2024 reviewed electrolyte are stable.  She is tolerating Xarelto very well.      2. Essential hypertension.  Her blood pressure is on the lower side she gets episode of dizziness and lightheadedness.  Because of that we have cut back her Avapro to 150 mg daily continue Toprol-XL 25.  If she still remains dizzy we will cut back on her Maxide to half tablet daily.  Last creatinine 1.24.         3. Dyslipidemia.  She was on high intensity statin.  She has a good HDL she has no documented history of CAD or stroke.  Considering her age and no documented history we will cut back Lipitor to 20 mg.       4. Obesity with BMI around now 40 she has gained more encouraged her to lose weight.    5. Obstructive sleep apnea.  History of sleep apnea but could not tolerate CPAP machine discussed with patient    6. Bilateral lower extremity edema.   No more leg edema.  Current medications seem to be helping her.    7. History of meningioma no current issues    8.  Episode of dizziness when she quickly stands up most likely secondary to her low blood pressure due to multiple blood pressure medication.  Avapro was cut back to 150 mg daily she will call us if she still have dizziness we will  cut back Maxide to half tablet daily.    Follow-up 6 months.    Please call [phone] if any questions.  Counseling :  A description of the counseling.  Goals and Barriers.  Patient's ability to self care: Yes  Medication side effect reviewed with patient in detail and all their questions answered to their satisfaction.    HPI :     Venita Alfredo is a 84 y.o. year old female who came for follow up. Patient was recently admitted to [facility] with palpitations and rapid heartbeat and was found to be in AFib with rapid ventricular rate.  She has a past medical history significant for essential hypertension, obesity with BMI around 39, dyslipidemia, previous history of atrial fibrillation but not on antithrombotic therapy as she was getting brief episodes, who noted she went into AFib earlier that morning and was sent to the emergency room.  Patient spontaneously converted back to sinus rhythm and was started on antithrombotic therapy and added low-dose metoprolol.  Currently she came for follow-up.  She did have some rash she is not sure it related to Toprol XL or Xarelto but it has slowly improved..  At this time she denies any chest pain any shortness of breath.  She has some chronic shortness of breath which is not changed.  No nausea no vomiting no PND no orthopnea no palpitations at all.  She does monitor heart rate regularly at generally is around 70 beats per minute.     11/18/2021.      Above reviewed.  Patient came for follow-up.  She has medical history significant for paroxysmal atrial fibrillation, essential hypertension, dyslipidemia, obesity with BMI around 41, history of knee replacement surgery who came for follow-up.  She also history of recurrent UTI.  She has previous history of atrial fibrillation when she had UTIs she spontaneously converted back to regular rhythm.  He was started on low-dose flecainide since then she has no further episodes of atrial fibrillation.  She is on  antithrombotic therapy with Xarelto and she is compliant with her cholesterol medications.  Her blood pressure is well controlled with current therapy.  Recently she was diagnosed to have eczema she also had sleep study which she failed and now she needs CPAP machine.  She gets anxious about it and that has been affecting her quality of life.  She will discussed with primary care doctor may need to see a psychologist.  She is also very anxious about her knees passing away who had a brain aneurysm.      12/12/2022.    Above reviewed.  Patient came for follow-up she is doing well.  She has lost some weight her BMI now 39 as she has gained back some of it.  She had history of paroxysmal atrial fibrillation suppressed with low-dose flecainide, hypertension, dyslipidemia, obesity and history of knee replacement surgery.  She had a history of recurrent UTI but she is doing well.  Since she has flecainide she is maintaining sinus rhythm and she is on Xarelto for antithrombotic therapy.  Today heart rate is 80 bpm and EKG shows no changes from previous EKG.  No nausea no vomiting no fever no chills no PND no orthopnea no other cardiovascular issues.  She is doing well from cardiac point of view her main problem is knee pain.    6/8/2023.    Above reviewed.  Patient came for follow-up she is doing well.  Her main issue is her back pain.  She has a back injection which is not helping much.  She has no new cardiovascular symptoms no palpitations EKG shows sinus some heart rate 79 bpm and her blood test from December 2022 are acceptable Labs are also reviewed from them.  No other cardiovascular issues.    12/19/2024.    Was reviewed.  Patient came for follow-up.  She was having episodes of dizziness particularly when she stood up quickly.  Her blood pressure readings are ranging from 100-120.  Occasionally even below 100.  EKG shows sinus rhythm with heart rate 64 bpm.  She walks with the help of cane other than feeling  occasional episodes of dizziness she feels great.  No nausea no vomiting no other cardiovascular issues.  She has medical history significant for PAF, suppressed with low-dose flecainide, hypertensive heart disease, dyslipidemia, obesity, history of knee replacement surgery and chronic lower extremity edema.  Her med list reviewed she was on Avapro 300 mg daily Toprol-XL 25 and Maxide 1 tablet daily.  She has no history of stroke her LDL has been around 70.    Review of Systems   Constitutional:  Negative for activity change, chills, diaphoresis, fever and unexpected weight change.   HENT:  Negative for congestion.    Eyes:  Negative for discharge and redness.   Respiratory:  Negative for cough, chest tightness, shortness of breath and wheezing.    Cardiovascular:  Positive for leg swelling. Negative for chest pain and palpitations.   Gastrointestinal:  Negative for abdominal pain, diarrhea and nausea.   Endocrine: Negative.    Genitourinary:  Negative for decreased urine volume and urgency.   Musculoskeletal: Negative.  Negative for arthralgias, back pain and gait problem.   Skin:  Negative for rash and wound.   Allergic/Immunologic: Negative.    Neurological:  Positive for dizziness. Negative for seizures, syncope, weakness, light-headedness and headaches.   Hematological: Negative.    Psychiatric/Behavioral:  Negative for agitation and confusion. The patient is nervous/anxious.        Historical Information   Past Medical History:   Diagnosis Date   • A-fib (HCC)    • Cancer (HCC)    • Hyperlipidemia    • Hypertension    • UTI (urinary tract infection)      Past Surgical History:   Procedure Laterality Date   • HYSTERECTOMY     • JOINT REPLACEMENT       Social History     Substance and Sexual Activity   Alcohol Use Not Currently    Comment: rarely     Social History     Substance and Sexual Activity   Drug Use No     Social History     Tobacco Use   Smoking Status Never   • Passive exposure: Past   Smokeless  "Tobacco Never     Family History:   Family History   Problem Relation Age of Onset   • Alzheimer's disease Mother    • Lung cancer Father    • Multiple sclerosis Daughter        Meds/Allergies     Allergies   Allergen Reactions   • Macrodantin [Nitrofurantoin Macrocrystal] Vomiting   • Nitrofurantoin    • Oxycodone-Acetaminophen Vomiting   • Sulfa Antibiotics        Current Outpatient Medications:   •  atorvastatin (LIPITOR) 20 mg tablet, Take 1 tablet (20 mg total) by mouth daily, Disp: 90 tablet, Rfl: 1  •  b complex vitamins tablet, Take 1 tablet by mouth daily, Disp: , Rfl:   •  Cholecalciferol (VITAMIN D-3) 1000 units CAPS, Take by mouth, Disp: , Rfl:   •  flecainide (TAMBOCOR) 50 mg tablet, Take 1 tablet (50 mg total) by mouth 2 (two) times a day, Disp: 28 tablet, Rfl: 0  •  irbesartan (AVAPRO) 150 mg tablet, Take 1 tablet (150 mg total) by mouth daily at bedtime, Disp: 90 tablet, Rfl: 1  •  metoprolol succinate (TOPROL-XL) 25 mg 24 hr tablet, TAKE 1 TABLET BY MOUTH ONCE  DAILY, Disp: 90 tablet, Rfl: 1  •  Multiple Vitamins-Minerals (CENTRUM ADULTS PO), Take by mouth, Disp: , Rfl:   •  Xarelto 20 MG tablet, TAKE 1 TABLET BY MOUTH DAILY  WITH DINNER, Disp: 90 tablet, Rfl: 3  •  flecainide (TAMBOCOR) 50 mg tablet, Take 1 tablet (50 mg total) by mouth 2 (two) times a day (Patient not taking: Reported on 12/19/2024), Disp: 180 tablet, Rfl: 3  •  triamterene-hydrochlorothiazide (MAXZIDE-25) 37.5-25 mg per tablet, TAKE 1 TABLET BY MOUTH DAILY, Disp: 90 tablet, Rfl: 1    Vitals: Blood pressure 112/70, pulse 84, height 5' 7\" (1.702 m), weight 117 kg (259 lb), SpO2 93%.    Body mass index is 40.57 kg/m².  Vitals:    12/19/24 1413   Weight: 117 kg (259 lb)     BP Readings from Last 3 Encounters:   12/19/24 112/70   09/13/24 124/80   06/20/24 108/70         Physical Exam  Constitutional:       General: She is not in acute distress.     Appearance: She is well-developed. She is not diaphoretic.   Neck:      Thyroid: No " thyromegaly.      Vascular: No JVD.      Trachea: No tracheal deviation.   Cardiovascular:      Rate and Rhythm: Normal rate and regular rhythm.      Heart sounds: S1 normal and S2 normal. Heart sounds not distant. Murmur heard.      Systolic (ejection) murmur is present with a grade of 2/6.      No friction rub. No gallop. No S3 or S4 sounds.   Pulmonary:      Effort: Pulmonary effort is normal. No respiratory distress.      Breath sounds: Normal breath sounds. No wheezing or rales.   Chest:      Chest wall: No tenderness.   Abdominal:      General: Bowel sounds are normal. There is no distension.      Palpations: Abdomen is soft.      Tenderness: There is no abdominal tenderness.   Musculoskeletal:         General: No deformity.      Cervical back: Neck supple.   Skin:     General: Skin is warm and dry.      Coloration: Skin is not pale.      Findings: No rash.   Neurological:      Mental Status: She is alert and oriented to person, place, and time.   Psychiatric:         Behavior: Behavior normal.         Judgment: Judgment normal.         Diagnostic Studies Review Cardio:    Echo Doppler.  Echo Doppler done 08/23/2019 shows EF 60%, left atrium size was normal, right atrium mildly dilated, no significant valvular disease.    Nuclear stress test.  Nuclear stress test done 10/09/2019 was probably normal.  Exercised for 5 minutes and 51 seconds.  EF was 75%.  No perfusion abnormality was noted.  There was imaged artifact noted.    EKG:  Twelve lead EKG done 09/25/2019 shows normal sinus rhythm heart rate 70 beats per minute.    Twelve lead EKG done today 10/16/2019 shows normal sinus rhythm heart rate 74 beats per minute.      Twelve lead EKG 12/06/2019 shows normal sinus rhythm heart rate 86 beats per minute.  She is staying in sinus rhythm normal intervals.    Twelve lead EKG done 06/09/2020 shows atrial flutter with heart rate 94 beats per minute.  Nonspecific ST changes.  As compared to previous EKG patient is  now in atrial flutter.    Twelve lead EKG done 10/28/2020 shows sinus rhythm first-degree AV block heart rate 78 beats per minute.  As compared to previous EKG patient is in sinus rhythm.      Twelve lead EKG 2021 shows sinus rhythm first-degree AV block heart rate 69 beats per minute.     12 lead EKG done on 2021 shows normal sinus rhythm first-degree AV block heart rate 62 beats per minute nonspecific ST changes QTC interval is acceptable.     12 lead EKG 2022 shows sinus rhythm first-degree AV block heart rate 60 beats per minute nonspecific ST  Changes.    Twelve-lead EKG done 2022 shows normal sinus with a first-degree block heart rate 80 bpm.  No change from previous EKG    Twelve-lead EKG done on 2023 shows normal sinus first-degree block heart rate 79 bpm    Twelve-lead EKG done on 2024 sinus than first-degree block with nonspecific ST and baseline artifact heart rate 64 bpm.    Cardiac testing:   Results for orders placed during the hospital encounter of 19   Echo complete with contrast if indicated    Narrative [place] [phone]    Transthoracic Echocardiogram  2D, M-mode, Doppler, and Color Doppler    Study date:  23-Aug-2019    Patient: DYLAN FLOOD  MR number: [id number]  Account number: [id number]  : 1940  Age: 79 years  Gender: Female  Status: Outpatient  Location: Bedside  Height: 67 in  Weight: 250.6 lb  BP: 152/ 72 mmHg    Indications: A.Fib.    Diagnoses: I48.0 - Atrial fibrillation    Sonographer:  CHENG Witt  Primary Physician:  Ainsley Enriquez MD  Referring Physician:  Sandhya Mcfarlane MD  Group:  [facility]  Interpreting Physician:  Sandhya Mcfarlane MD    SUMMARY    LEFT VENTRICLE:  Systolic function was normal. Ejection fraction was estimated in the range of 55 % to 60 % to be 60 %.  There were no regional wall motion  abnormalities.  Wall thickness was mildly increased.  There was mild concentric hypertrophy.    RIGHT ATRIUM:  The atrium was mildly dilated.    MITRAL VALVE:  There was trace regurgitation.    TRICUSPID VALVE:  There was mild regurgitation.  Estimated peak PA pressure was 30 mmHg.    HISTORY: PRIOR HISTORY: HTN,Hyperlipidemia,A.Fib.,Cancer.    PROCEDURE: The procedure was performed at the bedside. This was a routine study. The transthoracic approach was used. The study included complete 2D imaging, M-mode, complete spectral Doppler, and color Doppler. The heart rate was 69 bpm,  at the start of the study. Images were obtained from the parasternal, apical, subcostal, and suprasternal notch acoustic windows. Image quality was adequate.    LEFT VENTRICLE: Size was normal. Systolic function was normal. Ejection fraction was estimated in the range of 55 % to 60 % to be 60 %. There were no regional wall motion abnormalities. Wall thickness was mildly increased. There was mild  concentric hypertrophy. DOPPLER: Left ventricular diastolic function parameters were normal for the patient's age.    RIGHT VENTRICLE: The size was normal. Systolic function was normal.    LEFT ATRIUM: Size was normal.    RIGHT ATRIUM: The atrium was mildly dilated.    MITRAL VALVE: There was normal leaflet separation. DOPPLER: The transmitral velocity was within the normal range. There was no evidence for stenosis. There was trace regurgitation.    AORTIC VALVE: The valve was trileaflet. Leaflets exhibited mildly increased thickness and normal cuspal separation. DOPPLER: Transaortic velocity was within the normal range. There was no evidence for stenosis. There was no regurgitation.    TRICUSPID VALVE: The valve structure was normal. There was normal leaflet separation. DOPPLER: The transtricuspid velocity was within the normal range. There was no evidence for stenosis. There was mild regurgitation. Estimated peak PA  pressure was 30  "mmHg.    PULMONIC VALVE: DOPPLER: There was no significant regurgitation.    PERICARDIUM: There was no thickening or calcification. There was no pericardial effusion.    AORTA: The root exhibited normal size.    SYSTEMIC VEINS: IVC: The inferior vena cava was normal in size. Respirophasic changes were normal.    SYSTEM MEASUREMENT TABLES    2D mode  AoR Diam 2D: 3.3 cm  LA Diam (2D): 3.9 cm  LA/Ao (2D): 1.18  FS (2D Teich): 28.2 %  IVSd (2D): 1.21 cm  LVDEV: 78.1 cmï¾³  LVESV: 35.3 cmï¾³  LVIDd(2D): 4.19 cm  LVISd (2D): 3.01 cm  LVOT Area 2D: 3.14 cmï¾²  LVPWd (2D): 1.2 cm  SV (Teich): 42.8 cmï¾³    Apical four chamber  LVEF A4C: 57 %    Unspecified Scan Mode  REYNALDO Cont Eq (Peak Chemo): 2.58 cmï¾²  LVOT Diam.: 2 cm  LVOT Vmax: 1290 mm/s  LVOT Vmax; Mean: 1290 mm/s  Peak Grad.; Mean: 7 mm[Hg]  MV Peak A Chemo: 622 mm/s  MV Peak E Chemo. Mean: 795 mm/s  MVA (PHT): 4.4 cmï¾²  PHT: 50 ms  Max P mm[Hg]  V Max: 2530 mm/s  Vmax: 2250 mm/s  RA Area: 19.5 cmï¾²  RA Volume: 59.7 cmï¾³  TAPSE: 2.1 cm    [employer] Accredited Echocardi[facility]    Prepared and electronically signed by    Sandhya Mcfarlane MD  Signed 23-Aug-2019 16:20:25         Lab Review   Lab Results   Component Value Date    WBC 8.54 2022    HGB 13.1 2022    HCT 41.6 2022    MCV 92 2022    RDW 13.7 2022     2022     BMP:  Lab Results   Component Value Date    SODIUM 138 2024    K 4.0 2024     2024    CO2 29 2024    BUN 28 (H) 2024    CREATININE 1.24 2024    GLUC 104 2023    GLUF 106 (H) 2024    CALCIUM 9.6 2024    EGFR 40 2024    MG 2.2 2016     LFT:  Lab Results   Component Value Date    AST 21 2024    ALT 19 2024    ALKPHOS 69 2024    TP 7.1 2024    ALB 3.9 2024      Lab Results   Component Value Date    QTL6WCFVRZTX 2.680 10/12/2020     No results found for: \"HGBA1C\"  Lipid Profile:   Lab " "Results   Component Value Date    CHOLESTEROL 180 06/08/2024    HDL 86 06/08/2024    LDLCALC 71 06/08/2024    TRIG 113 06/08/2024     Lab Results   Component Value Date    CHOLESTEROL 180 06/08/2024    CHOLESTEROL 180 12/07/2022     Lab Results   Component Value Date    CKTOTAL 69 04/17/2018    TROPONINI <0.02 08/23/2019     Lab Results   Component Value Date    NTBNP 143 04/17/2018            Dr. Sandhya Mcfarlane MD [facility]      \"This note has been constructed using a voice recognition system.Therefore there may be syntax, spelling, and/or grammatical errors. Please call if you have any questions. \"  "

## 2024-12-23 ENCOUNTER — OFFICE VISIT (OUTPATIENT)
Dept: FAMILY MEDICINE CLINIC | Facility: CLINIC | Age: 84
End: 2024-12-23
Payer: COMMERCIAL

## 2024-12-23 VITALS
BODY MASS INDEX: 40.97 KG/M2 | DIASTOLIC BLOOD PRESSURE: 80 MMHG | HEIGHT: 67 IN | RESPIRATION RATE: 16 BRPM | HEART RATE: 80 BPM | SYSTOLIC BLOOD PRESSURE: 110 MMHG | WEIGHT: 261 LBS | TEMPERATURE: 98.4 F

## 2024-12-23 DIAGNOSIS — E78.2 MIXED HYPERLIPIDEMIA: ICD-10-CM

## 2024-12-23 DIAGNOSIS — I10 BENIGN HYPERTENSION: Primary | ICD-10-CM

## 2024-12-23 DIAGNOSIS — I48.0 PAROXYSMAL ATRIAL FIBRILLATION (HCC): ICD-10-CM

## 2024-12-23 DIAGNOSIS — E66.01 MORBID OBESITY (HCC): ICD-10-CM

## 2024-12-23 PROCEDURE — G2211 COMPLEX E/M VISIT ADD ON: HCPCS | Performed by: INTERNAL MEDICINE

## 2024-12-23 PROCEDURE — 99214 OFFICE O/P EST MOD 30 MIN: CPT | Performed by: INTERNAL MEDICINE

## 2024-12-23 NOTE — ASSESSMENT & PLAN NOTE
Well controlled on current dose of irbesartan and will continue as ordered.   Orders:  •  CBC; Future  •  Comprehensive metabolic panel; Future  •  Lipid panel; Future

## 2024-12-23 NOTE — PROGRESS NOTES
"Name: Venita Alfredo      : 1940      MRN: [id number]  Encounter Provider: Ainsley Enriquez MD  Encounter Date: 2024   Encounter department: [facility]  :  Assessment & Plan  Benign hypertension  Well controlled on current dose of irbesartan and will continue as ordered.   Orders:  •  CBC; Future  •  Comprehensive metabolic panel; Future  •  Lipid panel; Future    Mixed hyperlipidemia  Continue atorvastatin and will check labs for lipids and LFT's.  Orders:  •  CBC; Future  •  Comprehensive metabolic panel; Future  •  Lipid panel; Future    Paroxysmal atrial fibrillation (HCC)  Managed by cardiology, continue flecainide, metoprolol, xarelto.       Morbid obesity (HCC)  Encouraged exercise and weight loss to decrease other comorbidities.               History of Present Illness     Here for follow up visit.  Her daughter thinks she is depressed but she goes to [place] for the winter and just thinks she'll be fine if she's down there.  Cardiologist recently cut back on her bp meds due to dizziness.  She is still occasionally dizzy but it has only been a week.       Review of Systems   Constitutional: Negative.    Respiratory: Negative.     Cardiovascular: Negative.    Neurological:  Positive for dizziness.   Psychiatric/Behavioral:  Negative for dysphoric mood.        Objective   /80   Pulse 80   Temp 98.4 °F (36.9 °C)   Resp 16   Ht 5' 7\" (1.702 m)   Wt 118 kg (261 lb)   LMP  (LMP Unknown)   BMI 40.88 kg/m²      Physical Exam  Constitutional:       Appearance: Normal appearance.   HENT:      Head: Normocephalic and atraumatic.   Eyes:      Pupils: Pupils are equal, round, and reactive to light.   Cardiovascular:      Rate and Rhythm: Normal rate and regular rhythm.      Heart sounds: No murmur heard.     No friction rub. No gallop.   Pulmonary:      Effort: Pulmonary effort is normal.      Breath sounds: Normal breath sounds. No wheezing or rales.   Abdominal:      General: " Abdomen is flat. Bowel sounds are normal.      Palpations: Abdomen is soft.      Tenderness: There is no abdominal tenderness.   Musculoskeletal:         General: No swelling.   Neurological:      Mental Status: She is alert.   Psychiatric:         Mood and Affect: Mood normal.         Behavior: Behavior normal.         Thought Content: Thought content normal.         Judgment: Judgment normal.

## 2024-12-23 NOTE — ASSESSMENT & PLAN NOTE
Continue atorvastatin and will check labs for lipids and LFT's.  Orders:  •  CBC; Future  •  Comprehensive metabolic panel; Future  •  Lipid panel; Future

## 2025-01-25 NOTE — TELEPHONE ENCOUNTER
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 
I called and spoke with the patient  Advised stable findings and no need for surgical intervention  Patient verbalized understanding and told me to thank you!
Opt out

## 2025-03-03 DIAGNOSIS — I48.0 PAROXYSMAL ATRIAL FIBRILLATION (HCC): ICD-10-CM

## 2025-03-03 DIAGNOSIS — R60.0 BILATERAL EDEMA OF LOWER EXTREMITY: ICD-10-CM

## 2025-03-05 RX ORDER — TRIAMTERENE AND HYDROCHLOROTHIAZIDE 37.5; 25 MG/1; MG/1
1 TABLET ORAL DAILY
Qty: 90 TABLET | Refills: 1 | Status: SHIPPED | OUTPATIENT
Start: 2025-03-05 | End: 2025-03-13 | Stop reason: SDUPTHER

## 2025-03-05 RX ORDER — METOPROLOL SUCCINATE 25 MG/1
25 TABLET, EXTENDED RELEASE ORAL DAILY
Qty: 90 TABLET | Refills: 1 | Status: SHIPPED | OUTPATIENT
Start: 2025-03-05

## 2025-03-12 ENCOUNTER — NURSE TRIAGE (OUTPATIENT)
Age: 85
End: 2025-03-12

## 2025-03-12 NOTE — TELEPHONE ENCOUNTER
"Please review and reorder this medication for the patient. She reports that just today the   triamterene-hydrochlorothiazide (MAXZIDE-25) 37.5-25 mg was canceled by the pharmacy ([facility]) due to the differences in the way it is written for and the way the patient is taking.     Pt reports she is to take 1/2 table daily which was changed at the 12/19 visit. Please review and resend a new order to [facility] for the patient. Thank you          Answer Assessment - Initial Assessment Questions  1. NAME of MEDICINE: \"What medicine(s) are you calling about?\"      triamterene-hydrochlorothiazide (MAXZIDE-25) 37.5-25 mg per tablet  2. QUESTION: \"What is your question?\" (e.g., double dose of medicine, side effect)      Prescription was canceled by [facility] due to sig difference   3. PRESCRIBER: \"Who prescribed the medicine?\" Reason: if prescribed by specialist, call should be referred to that group.      Sandhya Mcfarlane    Protocols used: Medication Question Call-Adult-[place]    "

## 2025-03-13 DIAGNOSIS — I48.0 PAROXYSMAL ATRIAL FIBRILLATION (HCC): ICD-10-CM

## 2025-03-13 DIAGNOSIS — R60.0 BILATERAL EDEMA OF LOWER EXTREMITY: ICD-10-CM

## 2025-03-13 RX ORDER — TRIAMTERENE AND HYDROCHLOROTHIAZIDE 37.5; 25 MG/1; MG/1
0.5 TABLET ORAL DAILY
Qty: 45 TABLET | Refills: 2 | Status: SHIPPED | OUTPATIENT
Start: 2025-03-13 | End: 2025-03-13 | Stop reason: SDUPTHER

## 2025-03-13 RX ORDER — TRIAMTERENE AND HYDROCHLOROTHIAZIDE 37.5; 25 MG/1; MG/1
0.5 TABLET ORAL DAILY
Qty: 45 TABLET | Refills: 2 | Status: SHIPPED | OUTPATIENT
Start: 2025-03-13

## 2025-03-13 NOTE — PROGRESS NOTES
I called and spoke with patient and she let me know that she does have enough medicine, but she got nervous when she received a notification from [facility] that stated they were cancelling the prescription due to Sig needing clarification.    The prescription you sent went to [facility] and not [facility]. I have fixed the discrepancy of the Sig and pended the order for [facility].

## 2025-05-07 DIAGNOSIS — I10 BENIGN HYPERTENSION: ICD-10-CM

## 2025-05-07 RX ORDER — IRBESARTAN 150 MG/1
150 TABLET ORAL
Qty: 90 TABLET | Refills: 1 | Status: SHIPPED | OUTPATIENT
Start: 2025-05-07

## 2025-06-16 ENCOUNTER — RA CDI HCC (OUTPATIENT)
Dept: OTHER | Facility: HOSPITAL | Age: 85
End: 2025-06-16

## 2025-06-23 ENCOUNTER — OFFICE VISIT (OUTPATIENT)
Dept: FAMILY MEDICINE CLINIC | Facility: CLINIC | Age: 85
End: 2025-06-23
Payer: COMMERCIAL

## 2025-06-23 VITALS
HEART RATE: 62 BPM | BODY MASS INDEX: 40.65 KG/M2 | RESPIRATION RATE: 18 BRPM | HEIGHT: 67 IN | WEIGHT: 259 LBS | OXYGEN SATURATION: 92 % | SYSTOLIC BLOOD PRESSURE: 130 MMHG | DIASTOLIC BLOOD PRESSURE: 80 MMHG | TEMPERATURE: 97.4 F

## 2025-06-23 DIAGNOSIS — D32.9 MENINGIOMA (HCC): ICD-10-CM

## 2025-06-23 DIAGNOSIS — N18.30 STAGE 3 CHRONIC KIDNEY DISEASE, UNSPECIFIED WHETHER STAGE 3A OR 3B CKD (HCC): ICD-10-CM

## 2025-06-23 DIAGNOSIS — I48.0 PAROXYSMAL ATRIAL FIBRILLATION (HCC): ICD-10-CM

## 2025-06-23 DIAGNOSIS — E66.01 MORBID OBESITY (HCC): ICD-10-CM

## 2025-06-23 DIAGNOSIS — Z00.00 MEDICARE ANNUAL WELLNESS VISIT, SUBSEQUENT: Primary | ICD-10-CM

## 2025-06-23 PROCEDURE — G0439 PPPS, SUBSEQ VISIT: HCPCS | Performed by: INTERNAL MEDICINE

## 2025-06-23 RX ORDER — MULTIVIT WITH MINERALS/LUTEIN
1000 TABLET ORAL DAILY
COMMUNITY

## 2025-06-23 NOTE — PROGRESS NOTES
Name: Venita Alfredo      : 1940      MRN: [id number]  Encounter Provider: Ainsley Enriquez MD  Encounter Date: 2025   Encounter department: [facility]  :  Assessment & Plan  Medicare annual wellness visit, subsequent         Paroxysmal atrial fibrillation (HCC)         Morbid obesity (HCC)           Meningioma (HCC)         Stage 3 chronic kidney disease, unspecified whether stage 3a or 3b CKD (HCC)  Lab Results   Component Value Date    EGFR 40 2024    EGFR 41 2023    EGFR 40 2022    CREATININE 1.24 2024    CREATININE 1.21 2023    CREATININE 1.25 2022               Preventive health issues were discussed with patient, and age appropriate screening tests were ordered as noted in patient's After Visit Summary. Personalized health advice and appropriate referrals for health education or preventive services given if needed, as noted in patient's After Visit Summary.    History of Present Illness     Here for AWV.       Patient Care Team:  Ainsley Enriquez MD as PCP - General  MD Ainsley Bailey MD    Review of Systems   Constitutional:  Negative for chills and fever.   HENT:  Negative for ear pain and sore throat.    Eyes:  Negative for pain and visual disturbance.   Respiratory:  Negative for cough and shortness of breath.    Cardiovascular:  Negative for chest pain and palpitations.   Gastrointestinal:  Negative for abdominal pain and vomiting.   Genitourinary:  Negative for dysuria and hematuria.   Musculoskeletal:  Negative for arthralgias and back pain.   Skin:  Negative for color change and rash.   Neurological:  Negative for seizures and syncope.   All other systems reviewed and are negative.    Medical History Reviewed by provider this encounter:       Annual Wellness Visit Questionnaire   Venita is here for her Subsequent Wellness visit.     Health Risk Assessment:   Patient rates overall health as good. Patient feels that their physical  health rating is same. Patient is satisfied with their life. Eyesight was rated as same. Hearing was rated as same. Patient feels that their emotional and mental health rating is same. Patients states they are never, rarely angry. Patient states they are sometimes unusually tired/fatigued. Pain experienced in the last 7 days has been some. Patient's pain rating has been 4/10. Patient states that she has experienced no weight loss or gain in last 6 months.     Depression Screening:   PHQ-2 Score: 0      Fall Risk Screening:   In the past year, patient has experienced: history of falling in past year    Number of falls: 1  Injured during fall?: Yes    Feels unsteady when standing or walking?: Yes    Worried about falling?: Yes      Urinary Incontinence Screening:   Patient has leaked urine accidently in the last six months.     Home Safety:  Patient has trouble with stairs inside or outside of their home. Patient has working smoke alarms and has working carbon monoxide detector. Home safety hazards include: none.     Nutrition:   Current diet is Regular.     Medications:   Patient is not currently taking any over-the-counter supplements. Patient is able to manage medications.     Activities of Daily Living (ADLs)/Instrumental Activities of Daily Living (IADLs):   Walk and transfer into and out of bed and chair?: Yes  Dress and groom yourself?: Yes    Bathe or shower yourself?: Yes    Feed yourself? Yes  Do your laundry/housekeeping?: Yes  Manage your money, pay your bills and track your expenses?: Yes  Make your own meals?: Yes    Do your own shopping?: Yes    Previous Hospitalizations:   Any hospitalizations or ED visits within the last 12 months?: No      Advance Care Planning:   Living will: Yes    Durable POA for healthcare: Yes    Advanced directive: Yes    End of Life Decisions reviewed with patient: Yes      Cognitive Screening:   Provider or family/friend/caregiver concerned regarding cognition?:  No    Preventive Screenings      Cardiovascular Screening:    General: Screening Not Indicated and History Lipid Disorder      Diabetes Screening:     General: Risks and Benefits Discussed      Colorectal Cancer Screening:     General: Screening Not Indicated      Breast Cancer Screening:     General: Screening Not Indicated      Cervical Cancer Screening:    General: Screening Not Indicated      Osteoporosis Screening:    General: Patient Declines      Abdominal Aortic Aneurysm (AAA) Screening:        General: Screening Not Indicated      Lung Cancer Screening:     General: Screening Not Indicated      Hepatitis C Screening:    General: Screening Not Indicated    Immunizations:  - Immunizations due: Zoster (Shingrix)  - Risks/benefits immunizations discussed      Screening, Brief Intervention, and Referral to Treatment (SBIRT)     Screening  Typical number of drinks in a day: 0  Typical number of drinks in a week: 0  Interpretation: Low risk drinking behavior.    Single Item Drug Screening:  How often have you used an illegal drug (including marijuana) or a prescription medication for non-medical reasons in the past year? never    Single Item Drug Screen Score: 0  Interpretation: Negative screen for possible drug use disorder    Brief Intervention  Alcohol & drug use screenings were reviewed. No concerns regarding substance use disorder identified.     Other Counseling Topics:   Car/seat belt/driving safety, skin self-exam, sunscreen and calcium and vitamin D intake and regular weightbearing exercise.     Social Drivers of Health     Financial Resource Strain: Low Risk  (9/1/2022)    Overall Financial Resource Strain (CARDIA)    • Difficulty of Paying Living Expenses: Not hard at all   Food Insecurity: No Food Insecurity (6/23/2025)    Nursing - Inadequate Food Risk Classification    • Worried About Running Out of Food in the Last Year: Never true    • Ran Out of Food in the Last Year: Never true   Transportation  "Needs: No Transportation Needs (6/23/2025)    PRAPARE - Transportation    • Lack of Transportation (Medical): No    • Lack of Transportation (Non-Medical): No   Housing Stability: Low Risk  (6/23/2025)    Housing Stability Vital Sign    • Unable to Pay for Housing in the Last Year: No    • Number of Times Moved in the Last Year: 0    • Homeless in the Last Year: No   Utilities: Not At Risk (6/23/2025)    [facility] Utilities    • Threatened with loss of utilities: No     No results found.    Objective   /80   Pulse 62   Temp (!) 97.4 °F (36.3 °C)   Resp 18   Ht 5' 7\" (1.702 m)   Wt 117 kg (259 lb)   LMP  (LMP Unknown)   SpO2 92%   BMI 40.57 kg/m²     Physical Exam  Constitutional:       General: She is not in acute distress.     Appearance: She is well-developed. She is not diaphoretic.   HENT:      Head: Normocephalic and atraumatic.      Right Ear: External ear normal.      Left Ear: External ear normal.      Nose: Nose normal.     Eyes:      Pupils: Pupils are equal, round, and reactive to light.     Neck:      Thyroid: No thyromegaly.      Vascular: No JVD.     Cardiovascular:      Rate and Rhythm: Regular rhythm.      Heart sounds: No murmur heard.     No friction rub. No gallop.   Pulmonary:      Effort: Pulmonary effort is normal.      Breath sounds: Normal breath sounds. No wheezing or rales.   Abdominal:      General: Bowel sounds are normal. There is no distension.      Palpations: Abdomen is soft.      Tenderness: There is no abdominal tenderness.     Musculoskeletal:         General: Normal range of motion.      Cervical back: Normal range of motion and neck supple.     Skin:     General: Skin is warm and dry.      Findings: No rash.     Neurological:      Mental Status: She is alert and oriented to person, place, and time.      Cranial Nerves: No cranial nerve deficit.      Sensory: No sensory deficit.      Motor: No abnormal muscle tone.      Coordination: Coordination normal.      Deep Tendon " Reflexes: Reflexes normal.     Psychiatric:         Behavior: Behavior normal.         Thought Content: Thought content normal.         Judgment: Judgment normal.

## 2025-06-23 NOTE — PATIENT INSTRUCTIONS

## 2025-06-23 NOTE — ASSESSMENT & PLAN NOTE
Lab Results   Component Value Date    EGFR 40 06/08/2024    EGFR 41 12/14/2023    EGFR 40 12/07/2022    CREATININE 1.24 06/08/2024    CREATININE 1.21 12/14/2023    CREATININE 1.25 12/07/2022

## 2025-08-15 LAB — HBA1C MFR BLD HPLC: 5.9 %

## 2025-08-17 ENCOUNTER — TELEPHONE (OUTPATIENT)
Dept: OTHER | Facility: OTHER | Age: 85
End: 2025-08-17

## 2025-08-18 ENCOUNTER — NURSE TRIAGE (OUTPATIENT)
Age: 85
End: 2025-08-18

## 2025-08-19 ENCOUNTER — TELEPHONE (OUTPATIENT)
Dept: FAMILY MEDICINE CLINIC | Facility: CLINIC | Age: 85
End: 2025-08-19

## 2025-08-19 ENCOUNTER — OFFICE VISIT (OUTPATIENT)
Dept: FAMILY MEDICINE CLINIC | Facility: CLINIC | Age: 85
End: 2025-08-19
Payer: COMMERCIAL

## 2025-08-19 VITALS
DIASTOLIC BLOOD PRESSURE: 62 MMHG | HEIGHT: 67 IN | HEART RATE: 80 BPM | WEIGHT: 266.8 LBS | RESPIRATION RATE: 16 BRPM | BODY MASS INDEX: 41.88 KG/M2 | TEMPERATURE: 99.2 F | SYSTOLIC BLOOD PRESSURE: 120 MMHG

## 2025-08-19 DIAGNOSIS — A41.9 SEPSIS SECONDARY TO UTI  (HCC): ICD-10-CM

## 2025-08-19 DIAGNOSIS — Z99.89 DEPENDENCE ON CANE: ICD-10-CM

## 2025-08-19 DIAGNOSIS — N39.0 SEPSIS SECONDARY TO UTI  (HCC): ICD-10-CM

## 2025-08-19 DIAGNOSIS — I10 BENIGN HYPERTENSION: Primary | ICD-10-CM

## 2025-08-19 PROCEDURE — G2211 COMPLEX E/M VISIT ADD ON: HCPCS | Performed by: FAMILY MEDICINE

## 2025-08-19 PROCEDURE — 99214 OFFICE O/P EST MOD 30 MIN: CPT | Performed by: FAMILY MEDICINE

## 2025-08-19 RX ORDER — CIPROFLOXACIN 500 MG/1
1 TABLET, FILM COATED ORAL 2 TIMES DAILY
COMMUNITY
Start: 2025-08-16

## 2025-08-23 ENCOUNTER — NURSE TRIAGE (OUTPATIENT)
Dept: OTHER | Facility: OTHER | Age: 85
End: 2025-08-23